# Patient Record
Sex: MALE | Race: WHITE | NOT HISPANIC OR LATINO | Employment: OTHER | URBAN - METROPOLITAN AREA
[De-identification: names, ages, dates, MRNs, and addresses within clinical notes are randomized per-mention and may not be internally consistent; named-entity substitution may affect disease eponyms.]

---

## 2017-01-31 ENCOUNTER — LAB CONVERSION - ENCOUNTER (OUTPATIENT)
Dept: OTHER | Facility: OTHER | Age: 52
End: 2017-01-31

## 2017-01-31 LAB
CREATINE KINASE-MB FRACTION (HISTORICAL): 2.4
INFLUENZA A PCR (HISTORICAL): NEGATIVE
INFLUENZA B PCR (HISTORICAL): NEGATIVE

## 2017-02-01 ENCOUNTER — ALLSCRIPTS OFFICE VISIT (OUTPATIENT)
Dept: OTHER | Facility: OTHER | Age: 52
End: 2017-02-01

## 2017-03-01 ENCOUNTER — GENERIC CONVERSION - ENCOUNTER (OUTPATIENT)
Dept: INTERNAL MEDICINE CLINIC | Facility: CLINIC | Age: 52
End: 2017-03-01

## 2017-03-01 ENCOUNTER — GENERIC CONVERSION - ENCOUNTER (OUTPATIENT)
Dept: OTHER | Facility: OTHER | Age: 52
End: 2017-03-01

## 2017-03-01 ENCOUNTER — ALLSCRIPTS OFFICE VISIT (OUTPATIENT)
Dept: OTHER | Facility: OTHER | Age: 52
End: 2017-03-01

## 2017-04-03 ENCOUNTER — LAB CONVERSION - ENCOUNTER (OUTPATIENT)
Dept: OTHER | Facility: OTHER | Age: 52
End: 2017-04-03

## 2017-04-03 LAB — Lab: NORMAL

## 2017-04-25 DIAGNOSIS — Z12.11 ENCOUNTER FOR SCREENING FOR MALIGNANT NEOPLASM OF COLON: ICD-10-CM

## 2017-04-25 DIAGNOSIS — F41.1 GENERALIZED ANXIETY DISORDER: ICD-10-CM

## 2017-04-25 DIAGNOSIS — Z12.5 ENCOUNTER FOR SCREENING FOR MALIGNANT NEOPLASM OF PROSTATE: ICD-10-CM

## 2017-04-25 DIAGNOSIS — I10 ESSENTIAL (PRIMARY) HYPERTENSION: ICD-10-CM

## 2017-05-03 ENCOUNTER — ALLSCRIPTS OFFICE VISIT (OUTPATIENT)
Dept: OTHER | Facility: OTHER | Age: 52
End: 2017-05-03

## 2017-07-05 ENCOUNTER — TRANSCRIBE ORDERS (OUTPATIENT)
Dept: LAB | Facility: MEDICAL CENTER | Age: 52
End: 2017-07-05

## 2017-07-05 ENCOUNTER — ALLSCRIPTS OFFICE VISIT (OUTPATIENT)
Dept: OTHER | Facility: OTHER | Age: 52
End: 2017-07-05

## 2017-07-05 ENCOUNTER — APPOINTMENT (OUTPATIENT)
Dept: LAB | Facility: MEDICAL CENTER | Age: 52
End: 2017-07-05
Payer: MEDICARE

## 2017-07-05 DIAGNOSIS — K80.20 CALCULUS OF GALLBLADDER WITHOUT CHOLECYSTITIS WITHOUT OBSTRUCTION: ICD-10-CM

## 2017-07-05 DIAGNOSIS — Z12.11 ENCOUNTER FOR SCREENING FOR MALIGNANT NEOPLASM OF COLON: ICD-10-CM

## 2017-07-05 LAB — HEMOCCULT STL QL IA: NEGATIVE

## 2017-07-05 PROCEDURE — G0328 FECAL BLOOD SCRN IMMUNOASSAY: HCPCS

## 2017-09-15 ENCOUNTER — ALLSCRIPTS OFFICE VISIT (OUTPATIENT)
Dept: OTHER | Facility: OTHER | Age: 52
End: 2017-09-15

## 2017-09-19 ENCOUNTER — GENERIC CONVERSION - ENCOUNTER (OUTPATIENT)
Dept: OTHER | Facility: OTHER | Age: 52
End: 2017-09-19

## 2017-10-09 ENCOUNTER — GENERIC CONVERSION - ENCOUNTER (OUTPATIENT)
Dept: OTHER | Facility: OTHER | Age: 52
End: 2017-10-09

## 2017-10-09 DIAGNOSIS — Z11.59 ENCOUNTER FOR SCREENING FOR OTHER VIRAL DISEASES (CODE): ICD-10-CM

## 2017-10-09 DIAGNOSIS — R53.83 OTHER FATIGUE: ICD-10-CM

## 2017-11-13 ENCOUNTER — ALLSCRIPTS OFFICE VISIT (OUTPATIENT)
Dept: OTHER | Facility: OTHER | Age: 52
End: 2017-11-13

## 2017-11-14 NOTE — PROGRESS NOTES
Assessment    1  Benign essential hypertension (401 1) (I10)   2  Fatigue (780 79) (R53 83)    Plan  Generalized anxiety disorder    · ALPRAZolam 2 MG Oral Tablet; one tablet bid    Discussion/Summary  Discussion Summary:   Doing better  BP up and now on the borderline high side  Will continue with current doses and recheck in six weeks  May need to increase on of the meds  HR is better and may be able to increase the beta blocker as long as we don't bring back the fatigue  Pt to get his labs  RTC six weeks for routine  Medication SE Review and Pt Understands Tx: Possible side effects of new medications were reviewed with the patient/guardian today  The treatment plan was reviewed with the patient/guardian  The patient/guardian understands and agrees with the treatment plan      Chief Complaint  Chief Complaint Free Text Note Form: FU Last Visit - Hypotension, Dizziness  Orbie Mattock Orbie Mattock Pt feels better since BP has increased   no longer gets dizzy  History of Present Illness  HPI: WM RTC for f/u fatigue and low bp  Cut his ACEI in half  Did not get his labs  Feeling much better  No new c/o's  Review of Systems  Complete-Male:  Constitutional: no fever,-- not feeling poorly,-- no chills-- and-- not feeling tired  Cardiovascular: no chest pain  Respiratory: no shortness of breath  Gastrointestinal: no abdominal pain  Genitourinary: no dysuria  Neurological: no headache  Active Problems  1  Benign essential hypertension (401 1) (I10)   2  BMI 40 0-44 9, adult (V85 41) (Z68 41)   3  Chronic narcotic use (305 50) (F11 90)   4  Degeneration of intervertebral disc of lumbosacral region (722 52) (M51 37)   5  Fatigue (780 79) (R53 83)   6  Gallbladder polyp (575 6) (K82 4)   7  Generalized anxiety disorder (300 02) (F41 1)   8  Hepatic steatosis (571 8) (K76 0)   9  Marijuana use (305 20) (F12 90)   10  Need for hepatitis C screening test (V73 89) (Z11 59)   11  Nephrolithiasis (592 0) (N20 0)   12   Obesity (278 00) (E66 9)   13  Special screening for malignant neoplasms, colon (V76 51) (Z12 11)   14  Tobacco use disorder (305 1) (F17 200)    Past Medical History  1  History of Chest pain (786 50) (R07 9)   2  History of Constipation (564 00) (K59 00)   3  History of Encounter for prostate cancer screening (V76 44) (Z12 5)   4  History of Flu vaccine need (V04 81) (Z23)   5  History of folliculitis (Z88 1) (N03 2)   6  History of pleural effusion (V12 69) (Z87 09)   7  History of pneumonia (V12 61) (Z87 01)   8  History of Need for vaccination for DTP (V06 1) (Z23)  Active Problems And Past Medical History Reviewed: The active problems and past medical history were reviewed and updated today  Surgical History  1  History of Chest Tube Insertion   2  History of Hernia Repair   3  History of Laminectomy Lumbar   4  History of Tonsillectomy  Surgical History Reviewed: The surgical history was reviewed and updated today  Family History  Mother    1  Family history of cardiac disorder (V17 49) (Z82 49)   2  Family history of congestive heart failure (V17 49) (Z82 49)   3  Family history of diabetes mellitus (V18 0) (Z83 3)  Father    4  Family history of malignant neoplasm (V16 9) (Z80 9)   5  Family history of Liver carcinoma  Brother    6  Family history of malignant neoplasm (V16 9) (Z80 9)   7  Family history of malignant neoplasm of testis (V16 43) (Z80 43)   8  History of malignant neoplasm of lung  Family History    9  Denied: Family history of Drug abuse   10  Family history of Liver carcinoma  Family History Reviewed: The family history was reviewed and updated today         Social History     · Caffeine use (V49 89) (F15 90)   · Chronic narcotic use (305 50) (F11 90)   · Current every day smoker (305 1) (F17 200)   · Former consumption of alcohol (V11 3) (R80 563)   · History of drug use (305 93) (Z87 898)   · Lives with adult children   · Marijuana   · On disability   · One child   · Denied: Seeing a dentist   · Single  Social History Reviewed: The social history was reviewed and updated today  The social history was reviewed and is unchanged  Current Meds   1  ALPRAZolam 2 MG Oral Tablet; one tablet bid; Therapy: 71GDF2805 to (Last Rx:09Oct2017) Ordered   2  CloNIDine HCl - 0 3 MG Oral Tablet; TAKE ONE(1) TABLET BY MOUTH TWICE DAILY; Therapy: 54HPH0714 to (Last Rx:57Qzn4590)  Requested for: 08Dfc5214 Ordered   3  Furosemide 40 MG Oral Tablet; TAKE ONE (1) TABLET BY MOUTH ONCE DAILY PRN; Therapy: 09WEP0204 to (Last Rx:14Kpv7103)  Requested for: 32Rxv3051 Ordered   4  Lyrica 200 MG Oral Capsule; Take 1 capsule twice daily; Therapy: (Recorded:40Sei5267) to Recorded   5  Metoprolol Tartrate 50 MG Oral Tablet; TAKE ONE(1) TABLET BY MOUTH TWICE DAILY; Therapy: 57AHI2074 to (Last Rx:32Fph8967)  Requested for: 75Cyj9946 Ordered   6  Percocet  MG Oral Tablet; TAKE 1 TABLET 4 TIMES DAILY AS NEEDED FOR PAIN; Therapy: (Ifeoma Nap) to Recorded  Medication List Reviewed: The medication list was reviewed and updated today  Allergies  1  Codeine Sulfate TABS    Vitals  Vital Signs    Recorded: 07PML5144 01:50PM   Temperature 98 F   Heart Rate 86   Respiration 18   Systolic 426   Diastolic 82   Height 6 ft    Weight 303 lb    BMI Calculated 41 09   BSA Calculated 2 54       Physical Exam   Constitutional  General appearance: No acute distress, well appearing and well nourished  Eyes  Conjunctiva and lids: No swelling, erythema, or discharge  Pupils and irises: Equal, round and reactive to light  Ears, Nose, Mouth, and Throat  Oropharynx: Normal with no erythema, edema, exudate or lesions  Pulmonary  Respiratory effort: No increased work of breathing or signs of respiratory distress  Auscultation of lungs: Clear to auscultation, equal breath sounds bilaterally, no wheezes, no rales, no rhonci  Cardiovascular  Auscultation of heart: Normal rate and rhythm, normal S1 and S2, without murmurs  Examination of extremities for edema and/or varicosities: Normal    Abdomen  Abdomen: Non-tender, no masses  Musculoskeletal  Gait and station: Normal    Psychiatric  Orientation to person, place and time: Normal    Mood and affect: Normal          Health Management  Health Maintenance   Medicare Annual Wellness Visit; every 1 year; Last 23FNY8636; Next Due: 88BBD6392;  Active    Signatures   Electronically signed by : NASIM Desai ; Nov 13 2017  2:09PM EST                       (Author)

## 2017-12-26 ENCOUNTER — ALLSCRIPTS OFFICE VISIT (OUTPATIENT)
Dept: OTHER | Facility: OTHER | Age: 52
End: 2017-12-26

## 2018-01-13 VITALS
HEIGHT: 72 IN | BODY MASS INDEX: 42.56 KG/M2 | WEIGHT: 314.25 LBS | OXYGEN SATURATION: 96 % | SYSTOLIC BLOOD PRESSURE: 122 MMHG | TEMPERATURE: 98.6 F | DIASTOLIC BLOOD PRESSURE: 76 MMHG | HEART RATE: 73 BPM

## 2018-01-13 VITALS
BODY MASS INDEX: 41.04 KG/M2 | DIASTOLIC BLOOD PRESSURE: 82 MMHG | SYSTOLIC BLOOD PRESSURE: 144 MMHG | RESPIRATION RATE: 18 BRPM | HEIGHT: 72 IN | TEMPERATURE: 98 F | HEART RATE: 86 BPM | WEIGHT: 303 LBS

## 2018-01-14 VITALS
HEIGHT: 72 IN | TEMPERATURE: 98.6 F | BODY MASS INDEX: 41.58 KG/M2 | DIASTOLIC BLOOD PRESSURE: 88 MMHG | SYSTOLIC BLOOD PRESSURE: 152 MMHG | RESPIRATION RATE: 18 BRPM | WEIGHT: 307 LBS | HEART RATE: 96 BPM

## 2018-01-14 VITALS
BODY MASS INDEX: 42.16 KG/M2 | WEIGHT: 311.25 LBS | OXYGEN SATURATION: 97 % | HEIGHT: 72 IN | TEMPERATURE: 94.8 F | HEART RATE: 63 BPM | SYSTOLIC BLOOD PRESSURE: 124 MMHG | DIASTOLIC BLOOD PRESSURE: 72 MMHG

## 2018-01-14 VITALS
DIASTOLIC BLOOD PRESSURE: 78 MMHG | BODY MASS INDEX: 41.09 KG/M2 | TEMPERATURE: 96.8 F | WEIGHT: 303.38 LBS | HEIGHT: 72 IN | SYSTOLIC BLOOD PRESSURE: 126 MMHG

## 2018-01-15 ENCOUNTER — APPOINTMENT (OUTPATIENT)
Dept: LAB | Facility: CLINIC | Age: 53
End: 2018-01-15
Payer: MEDICARE

## 2018-01-15 ENCOUNTER — ALLSCRIPTS OFFICE VISIT (OUTPATIENT)
Dept: OTHER | Facility: OTHER | Age: 53
End: 2018-01-15

## 2018-01-15 ENCOUNTER — TRANSCRIBE ORDERS (OUTPATIENT)
Dept: LAB | Facility: CLINIC | Age: 53
End: 2018-01-15

## 2018-01-15 ENCOUNTER — GENERIC CONVERSION - ENCOUNTER (OUTPATIENT)
Dept: FAMILY MEDICINE CLINIC | Facility: CLINIC | Age: 53
End: 2018-01-15

## 2018-01-15 DIAGNOSIS — N28.9 DISORDER OF KIDNEY AND URETER: ICD-10-CM

## 2018-01-15 LAB
ALBUMIN SERPL BCP-MCNC: 3.7 G/DL (ref 3.5–5)
ALP SERPL-CCNC: 76 U/L (ref 46–116)
ALT SERPL W P-5'-P-CCNC: 38 U/L (ref 12–78)
ANION GAP SERPL CALCULATED.3IONS-SCNC: 3 MMOL/L (ref 4–13)
AST SERPL W P-5'-P-CCNC: 29 U/L (ref 5–45)
BILIRUB SERPL-MCNC: 0.46 MG/DL (ref 0.2–1)
BUN SERPL-MCNC: 15 MG/DL (ref 5–25)
CALCIUM SERPL-MCNC: 9.7 MG/DL (ref 8.3–10.1)
CHLORIDE SERPL-SCNC: 103 MMOL/L (ref 100–108)
CO2 SERPL-SCNC: 33 MMOL/L (ref 21–32)
CREAT SERPL-MCNC: 1.08 MG/DL (ref 0.6–1.3)
GFR SERPL CREATININE-BSD FRML MDRD: 78 ML/MIN/1.73SQ M
GLUCOSE SERPL-MCNC: 136 MG/DL (ref 65–140)
POTASSIUM SERPL-SCNC: 4.8 MMOL/L (ref 3.5–5.3)
PROT SERPL-MCNC: 7.5 G/DL (ref 6.4–8.2)
SODIUM SERPL-SCNC: 139 MMOL/L (ref 136–145)

## 2018-01-15 PROCEDURE — 36415 COLL VENOUS BLD VENIPUNCTURE: CPT

## 2018-01-15 PROCEDURE — 80053 COMPREHEN METABOLIC PANEL: CPT

## 2018-01-16 ENCOUNTER — GENERIC CONVERSION - ENCOUNTER (OUTPATIENT)
Dept: OTHER | Facility: OTHER | Age: 53
End: 2018-01-16

## 2018-01-22 VITALS
BODY MASS INDEX: 41.72 KG/M2 | HEIGHT: 72 IN | HEART RATE: 90 BPM | DIASTOLIC BLOOD PRESSURE: 60 MMHG | SYSTOLIC BLOOD PRESSURE: 100 MMHG | WEIGHT: 308 LBS | TEMPERATURE: 98.2 F | RESPIRATION RATE: 18 BRPM

## 2018-01-22 VITALS — DIASTOLIC BLOOD PRESSURE: 78 MMHG | SYSTOLIC BLOOD PRESSURE: 126 MMHG

## 2018-01-22 VITALS
OXYGEN SATURATION: 97 % | TEMPERATURE: 96.6 F | HEART RATE: 70 BPM | WEIGHT: 306.38 LBS | BODY MASS INDEX: 41.5 KG/M2 | HEIGHT: 72 IN

## 2018-01-23 VITALS
SYSTOLIC BLOOD PRESSURE: 156 MMHG | HEART RATE: 80 BPM | BODY MASS INDEX: 41.45 KG/M2 | WEIGHT: 306 LBS | TEMPERATURE: 98.3 F | DIASTOLIC BLOOD PRESSURE: 84 MMHG | HEIGHT: 72 IN | RESPIRATION RATE: 18 BRPM

## 2018-01-23 NOTE — MISCELLANEOUS
Assessment    1  Nephrolithiasis (592 0) (N20 0)   2  Acute renal insufficiency (593 9) (N28 9)    Plan  Acute renal insufficiency    · (1) COMPREHENSIVE METABOLIC PANEL; Status:Active; Requested SGA:75EMH3144; Perform:[facility]; EJN:86XOK9024;GKWJZMX; For:Acute renal insufficiency; Ordered By:Jesús Walker;  Generalized anxiety disorder    · ALPRAZolam 2 MG Oral Tablet; one tablet bid   Rx By: [facility]; Dispense: 0 Days ; #:60 Tablet; Refill: 0; For: Generalized anxiety disorder; GUILLERMINA = N; Print Rx; Last Updated By: Julio Cesar Dunn; 1/15/2018 12:57:20 PM  Nephrolithiasis    · Indomethacin 50 MG Oral Capsule; TAKE 1 CAPSULE 3 TIMES DAILY WITH FOOD  AS NEEDED   Rx By: [facility]; Dispense: 10 Days ; #:30 Capsule; Refill: 0; For: Nephrolithiasis; GUILLERMINA = N; Verified Transmission to [place] [address] ; Last Updated By: System, [employer]; 1/15/2018 1:05:51 PM   · Ketorolac Tromethamine 30 MG/ML Injection Solution; inject 1  ml  intramuscular; To Be Done: 99AKK4808   Rx By: [facility]; For: Nephrolithiasis; GUILLERMINA = N; Request Administration    Discussion/Summary  Discussion Summary:   Clinically stable  Will check renal function and add indomethacin to the flomax  Increase po fluids and keep f/u with   RTC as scheduled  Medication SE Review and Pt Understands Tx: Possible side effects of new medications were reviewed with the patient/guardian today  The treatment plan was reviewed with the patient/guardian  The patient/guardian understands and agrees with the treatment plan      Chief Complaint  Chief Complaint Free Text Note Form: [facility] FU - Kidney Stones, Infection  Dara Soulier Dara Soulier Pt still c/o mild left kidney pain and nocturia  History of Present Illness  TCM Communication [facility]: The patient is being contacted for follow-up after hospitalization and APPT 1-15-18  Hospital records were not available  He was hospitalized [facility]  The date of admission: 1-7-18, date of discharge: 1-11-18  Diagnosis: KIDNEY STONES, INFECTION  He was discharged to home  He scheduled a follow up appointment  Communication performed and completed by 1-12-18 SAF   HPI: WM with h/o nephrolithiasis presents for brief admission to [facility] for left flank pain  Found to have a non obstructing kidney stone and CLAUDIO  Hydrated and d/c'd  Doing a little better  On flomax  Hasn't seen  yet  Still with some left flank pain and rated a 4/10  No fever, chills, nausea, or vomiting  No gross hematuria  Review of Systems  Complete-Male:   Constitutional: feeling poorly and feeling tired, but no fever and no chills  Cardiovascular: no chest pain  Respiratory: no shortness of breath  Gastrointestinal: no abdominal pain  Genitourinary: as noted in HPI, no dysuria, no urinary hesitancy, no incontinence and no nocturia  Neurological: no headache  Active Problems    1  Benign essential hypertension (401 1) (I10)   2  BMI 40 0-44 9, adult (V85 41) (Z68 41)   3  Chronic narcotic use (305 50) (F11 90)   4  Degeneration of intervertebral disc of lumbosacral region (722 52) (M51 37)   5  Fatigue (780 79) (R53 83)   6  Gallbladder polyp (575 6) (K82 4)   7  Generalized anxiety disorder (300 02) (F41 1)   8  Hepatic steatosis (571 8) (K76 0)   9  Marijuana use (305 20) (F12 90)   10  Need for hepatitis C screening test (V73 89) (Z11 59)   11  Nephrolithiasis (592 0) (N20 0)   12  Obesity (278 00) (E66 9)   13  Special screening for malignant neoplasms, colon (V76 51) (Z12 11)   14  Tobacco use disorder (305 1) (F17 200)    Past Medical History    1  History of Chest pain (786 50) (R07 9)   2  History of Constipation (564 00) (K59 00)   3  History of Encounter for prostate cancer screening (V76 44) (Z12 5)   4  History of Flu vaccine need (V04 81) (Z23)   5  History of folliculitis (H39 2) (B84 6)   6  History of pleural effusion (V12 69) (Z87 09)   7  History of pneumonia (V12 61) (Z87 01)   8   History of Need for vaccination for DTP (V06 1) (Z23)    Surgical History    1  History of Chest Tube Insertion   2  History of Hernia Repair   3  History of Laminectomy Lumbar   4  History of Tonsillectomy  Surgical History Reviewed: The surgical history was reviewed and updated today  Family History  Mother    1  Family history of cardiac disorder (V17 49) (Z82 49)   2  Family history of congestive heart failure (V17 49) (Z82 49)   3  Family history of diabetes mellitus (V18 0) (Z83 3)  Father    4  Family history of malignant neoplasm (V16 9) (Z80 9)   5  Family history of Liver carcinoma  Brother    6  Family history of malignant neoplasm (V16 9) (Z80 9)   7  Family history of malignant neoplasm of testis (V16 43) (Z80 43)   8  History of malignant neoplasm of lung  Family History    9  Denied: Family history of Drug abuse   10  Family history of Liver carcinoma  Family History Reviewed: The family history was reviewed and updated today  Social History    · Caffeine use (V49 89) (F15 90)   · Chronic narcotic use (305 50) (F11 90)   · Current every day smoker (305 1) (F17 200)   · Former consumption of alcohol (V11 3) (Z87 898)   · History of drug use (305 93) (Z87 898)   · Lives with adult children   · Marijuana   · On disability   · One child   · Denied: Seeing a dentist   · Single  Social History Reviewed: The social history was reviewed and updated today  The social history was reviewed and is unchanged  Current Meds   1  ALPRAZolam 2 MG Oral Tablet; one tablet bid; Therapy: 85BST8801 to (Last Rx:54Acb6998) Ordered   2  CloNIDine HCl - 0 3 MG Oral Tablet; TAKE ONE(1) TABLET BY MOUTH TWICE DAILY; Therapy: 30HMM0678 to (Last Rx:18Cva7443)  Requested for: 33Xej4841 Ordered   3  Furosemide 40 MG Oral Tablet; TAKE ONE (1) TABLET BY MOUTH ONCE DAILY PRN; Therapy: 95STA5473 to (Last Rx:98Fld1802)  Requested for: 09Xld1976 Ordered   4  Lyrica 200 MG Oral Capsule; Take 1 capsule twice daily;    Therapy: (Recorded:31Xnm0473) to Recorded   5  Metoprolol Tartrate 50 MG Oral Tablet; TAKE ONE(1) TABLET BY MOUTH TWICE DAILY; Therapy: 52DBN4985 to (Last Rx:27Baj4639)  Requested for: 23Fxa0104 Ordered   6  Percocet  MG Oral Tablet; TAKE 1 TABLET 4 TIMES DAILY AS NEEDED FOR PAIN;   Therapy: (Kristine Pellant) to Recorded   7  Tamsulosin HCl - 0 4 MG Oral Capsule; TAKE 1 CAPSULE Daily; Therapy: 57CZA4717 to (Evaluate:10Jan2019) Recorded  Medication List Reviewed: The medication list was reviewed and updated today  Allergies    1  Codeine Sulfate TABS    Vitals  Signs   Recorded: 33RCT4781 12:55PM   Temperature: 98 3 F  Heart Rate: 80  Respiration: 18  Systolic: 674  Diastolic: 84  Height: 6 ft   Weight: 306 lb   BMI Calculated: 41 5  BSA Calculated: 2 55    Physical Exam    Constitutional   General appearance: No acute distress, well appearing and well nourished  Eyes   Conjunctiva and lids: No swelling, erythema, or discharge  Pupils and irises: Equal, round and reactive to light  Ears, Nose, Mouth, and Throat   Oropharynx: Normal with no erythema, edema, exudate or lesions  Pulmonary   Respiratory effort: No increased work of breathing or signs of respiratory distress  Auscultation of lungs: Clear to auscultation, equal breath sounds bilaterally, no wheezes, no rales, no rhonci  Cardiovascular   Auscultation of heart: Normal rate and rhythm, normal S1 and S2, without murmurs  Examination of extremities for edema and/or varicosities: Normal     Carotid pulses: Normal     Abdomen   Abdomen: Abnormal   Soft/ND with left Rylan's  BS wnl  w/o guarding or rebound  Musculoskeletal   Gait and station: Normal     Psychiatric   Orientation to person, place and time: Normal     Mood and affect: Normal          Health Management  Health Maintenance   Medicare Annual Wellness Visit; every 1 year;  Last 05BGB5904; Next Due: 92NOE5819; Near Due    Signatures   Electronically signed by : NASIM Gilmore ; Babak 15 2018  1:10PM EST                       (Author)

## 2018-01-23 NOTE — PROGRESS NOTES
Assessment   1  Encounter for preventive health examination (V70 0) (Z00 00)    Plan  Generalized anxiety disorder    · Start: ALPRAZolam 2 MG Oral Tablet; one tablet bid   · Follow-up visit in 1 month Evaluation and Treatment  Follow-up  Status: Hold For -  Scheduling  Requested for: 04DLD8187  Health Maintenance    · Medicare Annual Wellness Visit ; every 1 year; Last 12DXS9143; Next 23KMI2503;  Status:Active    Discussion/Summary    Refill rx  Form completed for dental work1      Impression: Subsequent Annual Wellness Visit, with preventive exam as well as age and risk appropriate counseling completed1   Cardiovascular screening and counselin  screening is current1  and counseling was given on maintaining a healthy weight1   Diabetes screening and counselin  screening is current1  and counseling was given on maintaining a healthy diet1   Colorectal cancer screening and counselin  screening is current1  and counseling was given on ways to eat a high fiber diet1   Prostate cancer screening and counselin  screening is current1   Osteoporosis screening and counselin  screening not indicated1 , counseling was given on obtaining adequate amounts of calcium and vitamin D on a daily basis1  and counseling was given on the importance of regular weightbearing exercise1   Abdominal aortic aneurysm screening and counselin  screening not indicated1   Glaucoma screening and counselin  screening is current1   HIV screening and counselin  screening not indicated1   Immunizations:1  Influenza vaccine is up to date this year1 , influenza vaccination is recommended annually1 , the lifetime pneumococcal vaccine has been completed1 , hepatitis B vaccination series is not indicated at this time due to the patient's low risk of sulma the disease1 , Td vaccination status is unknown1  and Tdap vaccination status is unknown1      Advance Directive Plannin  not complete1 , paperwork and instructions were given to the patient1 , he was encouraged to follow-up with me to discuss his questions and/or decisions1   Advice and education were given regarding1  weight loss1   Patient Discussion:1  plan discussed with the patient1 , follow-up visit needed in 1 month1   The patient has the current Goals:1  Weight loss,dental care1  The patent has the current Barriers:1  Depression1        Patient is able to Self-Care  The treatment plan was reviewed with the patient/guardian  The patient/guardian understands and agrees with the treatment plan1          Self Referrals: No       1 Amended By: Micheline Lester; Mar 01 2017 9:15 PM EST    Chief Complaint  Pt presents for Well Exam  Pt feels well today and without complaint  Appetite is normal per patient  Requests refill of his alprazolam today  History of Present Illness  HPI: Pt doing ok  Trying to get dental work done1    Welcome to [employer] and Wellness Visits: The patient is being seen for the subsequent annual wellness visit  Medicare Screening and Risk Factors1    Hospitalizations:1  he has been previously hospitalizied1  and he has been hospitalized hx pneumonia times1   Once per lifetime medicare screening tests:1  ECG1  and AAA screening US has not yet been done1   Medicare Screening Tests Risk Questions   Abdominal aortic aneurysm risk assessment:1  none indicated1   Osteoporosis risk assessment:1  caucasian1  and over 48years of age2   HIV risk assessment:1  none indicated1   Drug and Alcohol Use:1  The patient  is a current cigarette smoker1  1   He  has smoked for 40 year(s)1  1   He  is not ready to quit using tobacco1  1   The patient reports  never drinking alcohol1  1   Alcohol concern: 1   The patient has no concerns about alcohol abuse1   He  has never used illicit drugs1  1   Diet and Physical Activity:1  Current diet includes  low salt food choices1  and  limited junk food1  1    He  exercises infrequently1  1   Exercise:1  walking1  30 minutes per day1 , walks the dog1   Mood Disorder and Cognitive Impairment Screenin    Depression screening  Mild to moderate symptoms1   He denies feeling down, depressed, or hopeless over the past two weeks1   He denies feeling little interest or pleasure in doing things over the past two weeks1   Further Evaluation:1  sees psych1   Cognitive impairment screenin  denies difficulty learning/retaining new information1 , denies difficulty handling complex tasks1 , denies difficulty with reasoning1 , denies difficulty with spatial ability and orientation1 , denies difficulty with language1  and denies difficulty with behavior1   Functional Ability/Level of Safety:1  Hearing is1  slightly decreased1  and a hearing aid is not used1   He denies hearing difficulties1  The patient is currently1  able to do activities of daily living with limitations1 , able to do instrumental activities of daily living with limitations1 , able to participate in social activities with limitations1  and unable to drive1   Activities of daily living details:1  does not need help using the phone1 , no transportation help needed1 , does not need help shopping1 , no meal preparation help needed1 , does not need help doing housework1 , does not need help doing laundry1 , does not need help managing medications1  and does not need help managing money1   Home safety risk factors: 1  no unfamiliar surroundings1 , no loose rugs1 , no poor household lighting1 , no uneven floors1 , no household clutter1 , grab bars in the bathroom1  and handrails on the stairs1   Advance Directives:1  Advance directives: 1  no living will1 , no durable power of  for health care directives1  and no advance directives1   End of life decisions were reviewed with the patient1  and I agree with the patient's decisions1      Co-Managers and Medical Equipment/Suppliers: See Patient Care Team   Reviewed Updated [address] [place] [address] [address]:   Last Medicare Wellness Visit Information was reviewed, patient interviewed, no change since last AWV        1 Amended By: Karen Casanova; Mar 01 2017 9:12 PM EST    Patient Care Team    Care Team Member Role Specialty Office Number   Karen Casanova [facility]  Internal Medicine [phone]     Active Problems   1  Acute bronchitis (466 0) (J20 9)  2  Benign essential hypertension (401 1) (I10)  3  Cholelithiasis without cholecystitis (574 20) (K80 20)  4  Degeneration of intervertebral disc of lumbosacral region (722 52) (M51 37)  5  Encounter for screening for malignant neoplasm of colon (V76 51) (Z12 11)  6  Encounter for special screening examination for neoplasm of prostate (V76 44) (Z12 5)  7  Generalized anxiety disorder (300 02) (F41 1)    Past Medical History    · History of Chest pain (786 50) (R07 9)   · History of Constipation (564 00) (K59 00)   · History of Encounter for prostate cancer screening (V76 44) (Z12 5)   · History of Flu vaccine need (V04 81) (Z23)   · History of folliculitis (M79 9) (D61 2)   · History of pneumonia (V12 61) (Z87 01)   · History of Need for vaccination for DTP (V06 1) (Z23)    The active problems and past medical history were reviewed and updated today  Surgical History    · History of Hernia Repair   · History of Laminectomy Lumbar    The surgical history was reviewed and updated today  Family History  Mother    · Family history of cardiac disorder (V17 49) (Z80 55)  Father    · Family history of malignant neoplasm (V16 9) (Z80 9)  Brother    · Family history of malignant neoplasm (V16 9) (Z80 9)  Family History    · Denied: Family history of Drug abuse    The family history was reviewed and updated today  Social History    · Caffeine use (V49 89) (F15 90)   · Current every day smoker (305 1) (F17 200)   · No alcohol use   · No drug use   · Denied: Seeing a dentist   · Single  The social history was reviewed and updated today   The social history was reviewed and is unchanged  Current Meds  1  ALPRAZolam 2 MG Oral Tablet; one tablet bid; Therapy: 27PBO4315 to (Last Rx:88Izj5159) Ordered  2  CloNIDine HCl - 0 3 MG Oral Tablet; TAKE ONE(1) TABLET BY MOUTH TWICE DAILY; Therapy: 09OYF7845 to (Last Rx:30Uzx3495)  Requested for: 03Feb2017 Ordered  3  Furosemide 40 MG Oral Tablet; TAKE ONE (1) TABLET BY MOUTH ONCE DAILY PRN; Therapy: 97JUW4395 to (Last Rx:67Afu5499)  Requested for: 16Azp7672 Ordered  4  Lisinopril 40 MG Oral Tablet; TAKE ONE (1) TABLET DAILY IN THE EVENING; Therapy: 49PPF5320 to (Last Rx:86Whj5635)  Requested for: 12ZFT7452 Ordered  5  Metoprolol Tartrate 50 MG Oral Tablet; Take one tablet by mouth twice daily; Therapy: 22UPH6252 to (Last Rx:30Vmv5306)  Requested for: 16Ikj9010 Ordered    The medication list was reviewed and updated today  Allergies   1  Codeine Sulfate TABS    Immunizations   1 2    Influenza  31-Dec-2015 05-Oct-2016    PPSV  2011     Tdap  Temporarily Deferred: Pt refuses      Vitals  Signs    Systolic: 818  Diastolic: 78   Temperature: 96 6 F  Heart Rate: 70  Height: 6 ft   Weight: 306 lb 6 08 oz  BMI Calculated: 41 55  BSA Calculated: 2 55  O2 Saturation: 97    Physical Exam    Constitutional1    General appearance: No acute distress, well appearing and well nourished  1    Psychiatric1    Judgment and insight: Normal 1    Orientation to person, place and time: Normal 1    Recent and remote memory: Intact  1    Mood and affect: Normal 1        1 Amended By: Silvina Diego; Mar 01 2017 9:13 PM EST    Health Management  Health Maintenance   Medicare Annual Wellness Visit; every 1 year; Last 88KOS4416; Next Due: 06MEY0325;   Active    Signatures   Electronically signed by : Carolina Rendon DO; Mar  1 2017  9:15PM EST                       (Author)

## 2018-01-23 NOTE — MISCELLANEOUS
Provider Comments  Provider Comments:   NO SHOW NO CALL APPOINTMENT      Signatures   Electronically signed by : Sara Vogt, ; Dec 26 2017  1:13PM EST                       (Author)

## 2018-01-23 NOTE — RESULT NOTES
Verified Results  (1) COMPREHENSIVE METABOLIC PANEL [id number] 18:17CU Chase Hines Order Number: [id number]     Test Name Result Flag Reference   GLUCOSE,RANDM 136 mg/dL     If the patient is fasting, the ADA then defines impaired fasting glucose as > 100 mg/dL and diabetes as > or equal to 123 mg/dL  Specimen collection should occur prior to Sulfasalazine administration due to the potential for falsely depressed results  Specimen collection should occur prior to Sulfapyridine administration due to the potential for falsely elevated results  SODIUM 139 mmol/L  136-145   POTASSIUM 4 8 mmol/L  3 5-5 3   CHLORIDE 103 mmol/L  100-108   CARBON DIOXIDE 33 mmol/L H 21-32   ANION GAP (CALC) 3 mmol/L L 4-13   BLOOD UREA NITROGEN 15 mg/dL  5-25   CREATININE 1 08 mg/dL  0 60-1 30   Standardized to IDMS reference method   CALCIUM 9 7 mg/dL  8 3-10 1   BILI, TOTAL 0 46 mg/dL  0 20-1 00   ALK PHOSPHATAS 76 U/L     ALT (SGPT) 38 U/L  12-78   Specimen collection should occur prior to Sulfasalazine and/or Sulfapyridine administration due to the potential for falsely depressed results  AST(SGOT) 29 U/L  5-45   Specimen collection should occur prior to Sulfasalazine administration due to the potential for falsely depressed results  ALBUMIN 3 7 g/dL  3 5-5 0   TOTAL PROTEIN 7 5 g/dL  6 4-8 2   eGFR 78 ml/min/1 73sq m     [facility] Disease Education Program recommendations are as follows:  GFR calculation is accurate only with a steady state creatinine  Chronic Kidney disease less than 60 ml/min/1 73 sq  meters  Kidney failure less than 15 ml/min/1 73 sq  meters

## 2018-02-12 DIAGNOSIS — F41.1 GENERALIZED ANXIETY DISORDER: Primary | ICD-10-CM

## 2018-02-12 RX ORDER — ALPRAZOLAM 2 MG/1
1 TABLET ORAL 2 TIMES DAILY
COMMUNITY
Start: 2017-05-03 | End: 2018-02-12 | Stop reason: SDUPTHER

## 2018-02-12 RX ORDER — ALPRAZOLAM 2 MG/1
2 TABLET ORAL 2 TIMES DAILY
Qty: 60 TABLET | Refills: 0 | Status: SHIPPED | OUTPATIENT
Start: 2018-02-12 | End: 2018-03-13 | Stop reason: SDUPTHER

## 2018-03-13 ENCOUNTER — TELEPHONE (OUTPATIENT)
Dept: INTERNAL MEDICINE CLINIC | Facility: CLINIC | Age: 53
End: 2018-03-13

## 2018-03-13 DIAGNOSIS — F41.1 GENERALIZED ANXIETY DISORDER: ICD-10-CM

## 2018-03-13 RX ORDER — ALPRAZOLAM 2 MG/1
2 TABLET ORAL 2 TIMES DAILY
Qty: 60 TABLET | Refills: 0 | Status: SHIPPED | OUTPATIENT
Start: 2018-03-13 | End: 2018-04-09 | Stop reason: SDUPTHER

## 2018-04-09 DIAGNOSIS — F41.1 GENERALIZED ANXIETY DISORDER: ICD-10-CM

## 2018-04-09 RX ORDER — ALPRAZOLAM 2 MG/1
2 TABLET ORAL 2 TIMES DAILY
Qty: 60 TABLET | Refills: 0 | Status: SHIPPED | OUTPATIENT
Start: 2018-04-09 | End: 2018-05-08 | Stop reason: SDUPTHER

## 2018-04-17 PROBLEM — E66.9 OBESITY: Status: ACTIVE | Noted: 2017-09-15

## 2018-04-17 PROBLEM — F17.200 TOBACCO USE DISORDER: Status: ACTIVE | Noted: 2017-09-15

## 2018-04-17 PROBLEM — K76.0 HEPATIC STEATOSIS: Status: ACTIVE | Noted: 2017-09-15

## 2018-04-17 PROBLEM — N20.0 NEPHROLITHIASIS: Status: ACTIVE | Noted: 2017-09-15

## 2018-04-17 PROBLEM — N40.0 BENIGN PROSTATE HYPERPLASIA: Status: ACTIVE | Noted: 2018-01-15

## 2018-04-17 PROBLEM — K82.4 GALLBLADDER POLYP: Status: ACTIVE | Noted: 2017-09-15

## 2018-04-30 LAB
ALBUMIN SERPL BCP-MCNC: 4.3 G/DL (ref 3.5–5.7)
ALP SERPL-CCNC: 87 IU/L (ref 40–150)
ALT SERPL W P-5'-P-CCNC: 42 IU/L (ref 0–50)
AMYLASE (HISTORICAL): 25 U/L (ref 29–103)
ANION GAP SERPL CALCULATED.3IONS-SCNC: 12.9 MM/L
APTT PPP: 36.8 SEC (ref 24.4–37.6)
AST SERPL W P-5'-P-CCNC: 29 U/L (ref 8–27)
BACTERIA UR QL AUTO: ABNORMAL
BASOPHILS # BLD AUTO: 0.1 X3/UL (ref 0–0.3)
BASOPHILS # BLD AUTO: 0.9 % (ref 0–2)
BILIRUB SERPL-MCNC: 0.4 MG/DL (ref 0.3–1)
BILIRUB UR QL STRIP: NEGATIVE
BUN SERPL-MCNC: 17 MG/DL (ref 7–25)
CALCIUM SERPL-MCNC: 9.2 MG/DL (ref 8.6–10.5)
CHLORIDE SERPL-SCNC: 98 MM/L (ref 98–107)
CLARITY UR: CLEAR
CO2 SERPL-SCNC: 28 MM/L (ref 21–31)
COLOR UR: YELLOW
CREAT SERPL-MCNC: 1.29 MG/DL (ref 0.7–1.3)
DEPRECATED RDW RBC AUTO: 14 % (ref 11.5–14.5)
EGFR (HISTORICAL): 58 GFR
EGFR AFRICAN AMERICAN (HISTORICAL): > 60 GFR
EOSINOPHIL # BLD AUTO: 0.2 X3/UL (ref 0–0.5)
EOSINOPHIL NFR BLD AUTO: 1.7 % (ref 0–5)
GLUCOSE (HISTORICAL): 170 MG/DL (ref 65–99)
GLUCOSE UR STRIP-MCNC: ABNORMAL MG/DL
HCT VFR BLD AUTO: 44.8 % (ref 42–52)
HGB BLD-MCNC: 15.4 G/DL (ref 14–18)
HGB UR QL STRIP.AUTO: ABNORMAL
INR PPP: 1.05 (ref 0.9–1.5)
KETONES UR STRIP-MCNC: NEGATIVE MG/DL
LEUKOCYTE ESTERASE UR QL STRIP: NEGATIVE
LIPASE SERPL-CCNC: 33 U/L (ref 11–82)
LYMPHOCYTES # BLD AUTO: 1.9 X3/UL (ref 1.2–4.2)
LYMPHOCYTES NFR BLD AUTO: 13.1 % (ref 20.5–51.1)
MCH RBC QN AUTO: 30.1 PG (ref 26–34)
MCHC RBC AUTO-ENTMCNC: 34.3 G/DL (ref 31–36)
MCV RBC AUTO: 87.9 FL (ref 81–99)
MONOCYTES # BLD AUTO: 1.1 X3/UL (ref 0–1)
MONOCYTES NFR BLD AUTO: 7.7 % (ref 1.7–12)
MUCUS THREADS (HISTORICAL): ABNORMAL /HPF
NEUTROPHILS # BLD AUTO: 10.9 X3/UL (ref 1.4–6.5)
NEUTS SEG NFR BLD AUTO: 76.6 % (ref 42.2–75.2)
NITRITE UR QL STRIP: NEGATIVE
NON-SQ EPI CELLS URNS QL MICRO: ABNORMAL /HPF
OSMOLALITY, SERUM (HISTORICAL): 276 MOSM (ref 262–291)
PH UR STRIP.AUTO: 6.5 [PH] (ref 4.5–8)
PLATELET # BLD AUTO: 266 X3/UL (ref 130–400)
PMV BLD AUTO: 8.3 FL (ref 8.6–11.7)
POTASSIUM SERPL-SCNC: 3.9 MM/L (ref 3.5–5.5)
PROT UR STRIP-MCNC: NEGATIVE MG/DL
PROTHROMBIN TIME (HISTORICAL): 12.2 SEC (ref 10.1–12.9)
RBC # BLD AUTO: 5.1 X6/UL (ref 4.3–5.9)
RBC #/AREA URNS AUTO: ABNORMAL /HPF
SODIUM SERPL-SCNC: 135 MM/L (ref 134–143)
SP GR UR STRIP.AUTO: 1.01 (ref 1–1.03)
TOTAL PROTEIN (HISTORICAL): 7.2 G/DL (ref 6.4–8.9)
UROBILINOGEN UR QL STRIP.AUTO: 0.2 EU/DL (ref 0.2–8)
WBC # BLD AUTO: 14.3 X3/UL (ref 4.8–10.8)
WBC #/AREA URNS AUTO: ABNORMAL /HPF

## 2018-05-04 ENCOUNTER — TELEPHONE (OUTPATIENT)
Dept: INTERNAL MEDICINE CLINIC | Facility: CLINIC | Age: 53
End: 2018-05-04

## 2018-05-04 DIAGNOSIS — I10 BENIGN ESSENTIAL HYPERTENSION: ICD-10-CM

## 2018-05-04 DIAGNOSIS — F41.1 GENERALIZED ANXIETY DISORDER: Primary | ICD-10-CM

## 2018-05-04 RX ORDER — METOPROLOL TARTRATE 50 MG/1
50 TABLET, FILM COATED ORAL 2 TIMES DAILY
Qty: 60 TABLET | Refills: 2 | Status: SHIPPED | OUTPATIENT
Start: 2018-05-04 | End: 2018-10-27

## 2018-05-04 RX ORDER — FUROSEMIDE 40 MG/1
40 TABLET ORAL DAILY
Qty: 30 TABLET | Refills: 2 | Status: SHIPPED | OUTPATIENT
Start: 2018-05-04 | End: 2019-01-15

## 2018-05-04 RX ORDER — CLONIDINE HYDROCHLORIDE 0.3 MG/1
0.3 TABLET ORAL 2 TIMES DAILY
Qty: 60 TABLET | Refills: 0 | Status: SHIPPED | OUTPATIENT
Start: 2018-05-04 | End: 2018-06-21 | Stop reason: SDUPTHER

## 2018-05-08 ENCOUNTER — TRANSITIONAL CARE MANAGEMENT (OUTPATIENT)
Dept: INTERNAL MEDICINE CLINIC | Facility: CLINIC | Age: 53
End: 2018-05-08

## 2018-05-08 DIAGNOSIS — F41.1 GENERALIZED ANXIETY DISORDER: ICD-10-CM

## 2018-05-08 RX ORDER — ALPRAZOLAM 2 MG/1
2 TABLET ORAL 2 TIMES DAILY
Qty: 60 TABLET | Refills: 0 | Status: SHIPPED | OUTPATIENT
Start: 2018-05-08 | End: 2018-05-24 | Stop reason: SDUPTHER

## 2018-05-08 NOTE — TELEPHONE ENCOUNTER
Pt need refill on alprazolam 2mg tablet  Takes 2 x daily    Pharmacy first [facility]    Please advise    [phone]

## 2018-05-24 ENCOUNTER — OFFICE VISIT (OUTPATIENT)
Dept: INTERNAL MEDICINE CLINIC | Facility: CLINIC | Age: 53
End: 2018-05-24

## 2018-05-24 VITALS
HEART RATE: 86 BPM | RESPIRATION RATE: 18 BRPM | TEMPERATURE: 98.5 F | WEIGHT: 298 LBS | HEIGHT: 72 IN | DIASTOLIC BLOOD PRESSURE: 98 MMHG | BODY MASS INDEX: 40.36 KG/M2 | SYSTOLIC BLOOD PRESSURE: 178 MMHG

## 2018-05-24 DIAGNOSIS — M51.37 DEGENERATION OF INTERVERTEBRAL DISC OF LUMBOSACRAL REGION: ICD-10-CM

## 2018-05-24 DIAGNOSIS — F19.20 DRUG DEPENDENCE (HCC): ICD-10-CM

## 2018-05-24 DIAGNOSIS — G57.02 SCIATIC NERVE DISEASE, LEFT: Primary | ICD-10-CM

## 2018-05-24 DIAGNOSIS — F41.1 GENERALIZED ANXIETY DISORDER: ICD-10-CM

## 2018-05-24 PROCEDURE — TCMS TRANSITION OF CARE: Performed by: PHYSICIAN ASSISTANT

## 2018-05-24 PROCEDURE — 80365 DRUG SCREENING OXYCODONE: CPT | Performed by: PHYSICIAN ASSISTANT

## 2018-05-24 PROCEDURE — 80346 BENZODIAZEPINES1-12: CPT | Performed by: PHYSICIAN ASSISTANT

## 2018-05-24 PROCEDURE — 80307 DRUG TEST PRSMV CHEM ANLYZR: CPT | Performed by: PHYSICIAN ASSISTANT

## 2018-05-24 RX ORDER — DULOXETIN HYDROCHLORIDE 60 MG/1
60 CAPSULE, DELAYED RELEASE ORAL DAILY
Qty: 30 CAPSULE | Refills: 3 | Status: SHIPPED | OUTPATIENT
Start: 2018-05-24 | End: 2018-06-21 | Stop reason: SDUPTHER

## 2018-05-24 RX ORDER — OXYCODONE HYDROCHLORIDE 5 MG/1
10 TABLET ORAL EVERY 6 HOURS
COMMUNITY
Start: 2018-05-22 | End: 2018-06-01

## 2018-05-24 RX ORDER — METHOCARBAMOL 500 MG/1
TABLET, FILM COATED ORAL
COMMUNITY
Start: 2018-05-24 | End: 2018-06-01

## 2018-05-24 RX ORDER — ALPRAZOLAM 1 MG/1
1 TABLET ORAL 2 TIMES DAILY
Qty: 60 TABLET | Refills: 0 | Status: SHIPPED | OUTPATIENT
Start: 2018-05-24 | End: 2018-06-21 | Stop reason: SDUPTHER

## 2018-05-24 NOTE — PROGRESS NOTES
Transition of Care  Follow-up After Hospitalization    Marci Beyer 46 y o  male   Date:  5/24/2018    Patient was hopsitalized at:  [facility]  Date of admission:  5/4/18  Date of discharge:  5/8/18  Diagnosis:  siactica        Admit Date: 5/4/18  Discharge Date: 5/8/18  Diagnosis: sciatica  Location: [facility] were reviewed  Medications upon discharge reviewed/updated  Medication Changes: none  Imaging: MRI L-spine  Consults:   Discharge Disposition:  home  Follow up visits with other specialists: pain management at [facility] next week      Assessment and Plan:    Nikkie Damon was seen today for follow-up  Diagnoses and all orders for this visit:    Drug dependence ([address] )  -     Oxycodone/Oxymorphone urine  -     Toxicology screen, urine  -     Cancel: Alprazolam, Quantitative Urine  -     Alprazolam, Quantitative Urine            HPI:  Pt presents for a ROSE  He was admitted at [facility] for intense back pain and was diagnosed with left sided sciatica  He underwent an MRI of the lumbar spine which showed post surgical changes, disc bulging and protrusions and central canal as well as foraminal stenosis  He was given oxycodone 5mg tabs which he has been on for the last several months  He is also on robaxin and high dose lyrica and notes ongoing intense pain  He has tried steroids and toradol in the past without relief as well as epidural injections with his previous pain specialist, Dr Sandoval Sebastien  He is set up for [facility] pain management next week and will get a repeat MRI in 4 weeks  Upon review of the [facility] website pt has had 146 oxycodone tabs prescribed since 5/10/18 which should be a 47 day supply  He currently has 3 pills left and it has been 14 days since these were prescribed  The patient and I had a wiley discussion about his medication overuse  We also discussed the dangers of using this in combination with his high dose xanax  Pt is aware and also expresses concern   He states he has been desperate for relief which is why he has taken so much  He initially asked for an rx for the pain medicine until he can be seen by pain management but after our discussion he is interested in pursuing options to help avoid significant withdrawal and detox  He appears to be in moderate withdrawal presently with intense diaphoresis and fidgeting  BP very elevated as well  ROS: Review of Systems   Constitutional: Positive for diaphoresis  Negative for chills and fever  HENT: Negative for congestion, ear pain, hearing loss, postnasal drip, rhinorrhea, sinus pain, sinus pressure, sore throat and trouble swallowing  Eyes: Negative for pain and visual disturbance  Respiratory: Negative for cough, chest tightness, shortness of breath and wheezing  Cardiovascular: Negative  Negative for chest pain, palpitations and leg swelling  Gastrointestinal: Negative for abdominal pain, blood in stool, constipation, diarrhea, nausea and vomiting  Endocrine: Negative for cold intolerance, heat intolerance, polydipsia, polyphagia and polyuria  Genitourinary: Negative for difficulty urinating, dysuria, flank pain and urgency  Musculoskeletal: Positive for back pain and gait problem  Negative for arthralgias and myalgias  Skin: Negative for rash  Allergic/Immunologic: Negative  Neurological: Negative for dizziness, weakness, light-headedness and headaches  Hematological: Negative  Psychiatric/Behavioral: Negative for behavioral problems, dysphoric mood and sleep disturbance  The patient is not nervous/anxious          Past Medical History:   Diagnosis Date    Chest pain     Last Assessed: 10/5/2016    Hypertension     Pleural effusion     Last Assessed: 9/15/2017    Pneumonia     Sciatica        Past Surgical History:   Procedure Laterality Date    CHEST TUBE INSERTION      Last Assessed: 9/15/2017    HERNIA REPAIR  2011    LUMBAR LAMINECTOMY  2009    TONSILLECTOMY      Last Assessed: 9/15/2017       Social History     Social History    Marital status: Single     Spouse name: N/A    Number of children: 1    Years of education: N/A     Social History Main Topics    Smoking status: Current Every Day Smoker    Smokeless tobacco: Never Used    Alcohol use No      Comment: Former consumption of alcohol    Drug use: Yes     Types: Marijuana    Sexual activity: Not Asked     Other Topics Concern    None     Social History Narrative    Caffeine use    Chronic Narcotic use    Lives with adult children    On disability    Denied: seeing a dentist           Family History   Problem Relation Age of Onset    Other Mother      Cardiac Disorder    Heart failure Mother     Diabetes Mother     Cancer Father     Liver cancer Father     Cancer Brother     Testicular cancer Brother     Lung cancer Brother     Liver cancer Family        Allergies   Allergen Reactions    Codeine Itching         Current Outpatient Prescriptions:     ALPRAZolam (XANAX) 2 MG tablet, Take 1 tablet (2 mg total) by mouth 2 (two) times a day, Disp: 60 tablet, Rfl: 0    cloNIDine (CATAPRES) 0 3 mg tablet, Take 1 tablet (0 3 mg total) by mouth 2 (two) times a day, Disp: 60 tablet, Rfl: 0    furosemide (LASIX) 40 mg tablet, Take 1 tablet (40 mg total) by mouth daily, Disp: 30 tablet, Rfl: 2    methocarbamol (ROBAXIN) 500 mg tablet, , Disp: , Rfl:     metoprolol tartrate (LOPRESSOR) 50 mg tablet, Take 1 tablet (50 mg total) by mouth 2 (two) times a day, Disp: 60 tablet, Rfl: 2    oxyCODONE (ROXICODONE) 5 mg immediate release tablet, Take 10 mg by mouth every 6 (six) hours, Disp: , Rfl:     pregabalin (LYRICA) 200 MG capsule, Take 1 capsule by mouth 2 (two) times a day, Disp: , Rfl:     tamsulosin (FLOMAX) 0 4 mg, Take 1 capsule by mouth daily, Disp: , Rfl:       Physical Exam:  BP (!) 178/98   Pulse 86   Temp 98 5 °F (36 9 °C) (Tympanic)   Resp 18   Ht 6' (1 829 m)   Wt 135 kg (298 lb)   BMI 40 42 kg/m²     Physical Exam Constitutional: He is oriented to person, place, and time  He appears well-developed and well-nourished  No distress  HENT:   Head: Normocephalic and atraumatic  Right Ear: External ear normal    Left Ear: External ear normal    Nose: Nose normal    Mouth/Throat: Oropharynx is clear and moist  No oropharyngeal exudate  Eyes: Conjunctivae and EOM are normal  Pupils are equal, round, and reactive to light  Right eye exhibits no discharge  Left eye exhibits no discharge  No scleral icterus  Neck: Normal range of motion  Neck supple  No thyromegaly present  Cardiovascular: Normal rate, regular rhythm and normal heart sounds  Exam reveals no gallop and no friction rub  No murmur heard  Pulmonary/Chest: Effort normal and breath sounds normal  No respiratory distress  He has no wheezes  He has no rales  Abdominal: Soft  Bowel sounds are normal  He exhibits no distension  There is no tenderness  Musculoskeletal: Normal range of motion  He exhibits no edema, tenderness or deformity  Neurological: He is alert and oriented to person, place, and time  No cranial nerve deficit  Skin: Skin is warm and dry  He is not diaphoretic  Psychiatric: He has a normal mood and affect  His behavior is normal  Judgment and thought content normal            Labs:  No results found for: WBC, HGB, HCT, MCV, PLT  Lab Results   Component Value Date     01/15/2018    K 4 8 01/15/2018     01/15/2018    CO2 33 (H) 01/15/2018    ANIONGAP 3 (L) 01/15/2018    BUN 15 01/15/2018    CREATININE 1 08 01/15/2018    GLUCOSE 136 01/15/2018    CALCIUM 9 7 01/15/2018    AST 29 01/15/2018    ALT 38 01/15/2018    ALKPHOS 76 01/15/2018    PROT 7 5 01/15/2018    BILITOT 0 46 01/15/2018    EGFR 78 01/15/2018       A/P:  Persistent L-sided sciatica: Continue robaxin and lyrica  Continue upcoming appt with pain management  No opiates provided today  Pt deferred course of steroids or toradol due to poor success previously   I spoke with Dr Chema Curtis regarding the patient and he will take the patient on his suboxone program tomorrow  Pt aware and appreciative  Anxiety: Will decrease pts current dose xanax from 2mg BID to 1mg BID  Goal will be to further taper  Will start cymbalta to combat anxiety and also aid in chronic pain and neuropathy symptoms

## 2018-05-25 ENCOUNTER — OFFICE VISIT (OUTPATIENT)
Dept: INTERNAL MEDICINE CLINIC | Facility: CLINIC | Age: 53
End: 2018-05-25
Payer: MEDICARE

## 2018-05-25 VITALS
HEIGHT: 72 IN | HEART RATE: 77 BPM | DIASTOLIC BLOOD PRESSURE: 96 MMHG | RESPIRATION RATE: 20 BRPM | WEIGHT: 296.8 LBS | SYSTOLIC BLOOD PRESSURE: 172 MMHG | TEMPERATURE: 98.4 F | OXYGEN SATURATION: 98 % | BODY MASS INDEX: 40.2 KG/M2

## 2018-05-25 DIAGNOSIS — F19.20 DRUG DEPENDENCE (HCC): Primary | ICD-10-CM

## 2018-05-25 DIAGNOSIS — IMO0002 HISTORY OF PRESCRIPTION DRUG ABUSE: ICD-10-CM

## 2018-05-25 PROBLEM — Z79.899 ENCOUNTER FOR MONITORING SUBOXONE MAINTENANCE THERAPY: Status: ACTIVE | Noted: 2018-05-25

## 2018-05-25 PROBLEM — Z79.899 DRUG THERAPY CONTINUED: Status: ACTIVE | Noted: 2018-05-25

## 2018-05-25 PROBLEM — Z51.81 ENCOUNTER FOR MONITORING SUBOXONE MAINTENANCE THERAPY: Status: ACTIVE | Noted: 2018-05-25

## 2018-05-25 PROCEDURE — 99214 OFFICE O/P EST MOD 30 MIN: CPT | Performed by: INTERNAL MEDICINE

## 2018-05-25 PROCEDURE — 80307 DRUG TEST PRSMV CHEM ANLYZR: CPT | Performed by: INTERNAL MEDICINE

## 2018-05-25 RX ORDER — BUPRENORPHINE HYDROCHLORIDE AND NALOXONE HYDROCHLORIDE DIHYDRATE 8; 2 MG/1; MG/1
TABLET SUBLINGUAL
Qty: 14 TABLET | Refills: 0 | Status: SHIPPED | OUTPATIENT
Start: 2018-05-25 | End: 2018-06-01

## 2018-05-25 RX ORDER — BUPRENORPHINE HYDROCHLORIDE AND NALOXONE HYDROCHLORIDE DIHYDRATE 8; 2 MG/1; MG/1
TABLET SUBLINGUAL
Qty: 2 TABLET | Refills: 0 | Status: SHIPPED | OUTPATIENT
Start: 2018-05-25 | End: 2018-05-25

## 2018-05-25 NOTE — PROGRESS NOTES
Assessment/Plan:    No problem-specific Assessment & Plan notes found for this encounter  Diagnoses and all orders for this visit:    Drug dependence [facility])  -     Toxicology screen, urine  -     Discontinue: buprenorphine-naloxone (SUBOXONE) 8-2 mg per SL tablet; Return to office with sample to be dispensed  -     buprenorphine-naloxone (SUBOXONE) 8-2 mg per SL tablet; One or two sl q day  History of prescription drug abuse ([facility] )      A/P: Pt RTC with the med and dosed  After about 15 minutes, started to feel better and no side effects  Will start 16mg tabs, dose 1/6 and get into counseling  Instructed to keep meds safe and out of reach of children  RTC one week  Subjective:      Patient ID: Tyesha Azul is a 46 y o  male  WM, referred by our PA, due to prescription drug dependency  Pt reports suffering a major back injury seven years ago and was placed on chronic percocet  Pt quickly became addicted and was abusing 10/325 tabs nasally  Would go through 120 in less than two weeks  Denies any issues with the law  No prior detox, but several withdraws in the past  No counseling  Last used about 36 hours ago and in withdraw currently  Has also used cocaine in the distant past  Still uses THC at times  UDT obtained and sent to the lab  The following portions of the patient's history were reviewed and updated as appropriate:   He  has a past medical history of Arthritis; Chest pain; Hypertension; Kidney stone; Obesity; Pleural effusion; Pneumonia; and Sciatica    He   Patient Active Problem List    Diagnosis Date Noted    Encounter for monitoring Suboxone maintenance therapy 05/25/2018    Drug therapy continued 05/25/2018    History of prescription drug abuse ([facility] ) 05/25/2018    Sciatic nerve disease, left 05/24/2018    Drug dependence [facility] ) 05/24/2018    Benign prostate hyperplasia 01/15/2018    Gallbladder polyp 09/15/2017    Hepatic steatosis 09/15/2017    Nephrolithiasis 09/15/2017  Obesity 09/15/2017    Tobacco use disorder 09/15/2017    Benign essential hypertension 10/05/2015    Degeneration of intervertebral disc of lumbosacral region 10/05/2015    Generalized anxiety disorder 10/05/2015     He  has a past surgical history that includes Chest tube insertion; Hernia repair (2011); Lumbar laminectomy (2009); and Tonsillectomy  His family history includes Cancer in his brother and father; Diabetes in his mother; Heart failure in his mother; Liver cancer in his family and father; Lung cancer in his brother; Other in his mother; Testicular cancer in his brother  He  reports that he has been smoking  He has never used smokeless tobacco  He reports that he uses drugs, including Marijuana  He reports that he does not drink alcohol  Current Outpatient Prescriptions   Medication Sig Dispense Refill    ALPRAZolam (XANAX) 1 mg tablet Take 1 tablet (1 mg total) by mouth 2 (two) times a day 60 tablet 0    cloNIDine (CATAPRES) 0 3 mg tablet Take 1 tablet (0 3 mg total) by mouth 2 (two) times a day 60 tablet 0    DULoxetine (CYMBALTA) 60 mg delayed release capsule Take 1 capsule (60 mg total) by mouth daily 30 capsule 3    furosemide (LASIX) 40 mg tablet Take 1 tablet (40 mg total) by mouth daily 30 tablet 2    methocarbamol (ROBAXIN) 500 mg tablet       metoprolol tartrate (LOPRESSOR) 50 mg tablet Take 1 tablet (50 mg total) by mouth 2 (two) times a day 60 tablet 2    oxyCODONE (ROXICODONE) 5 mg immediate release tablet Take 10 mg by mouth every 6 (six) hours      pregabalin (LYRICA) 200 MG capsule Take 1 capsule by mouth 2 (two) times a day      tamsulosin (FLOMAX) 0 4 mg Take 1 capsule by mouth daily      buprenorphine-naloxone (SUBOXONE) 8-2 mg per SL tablet One or two sl q day  14 tablet 0     No current facility-administered medications for this visit        Current Outpatient Prescriptions on File Prior to Visit   Medication Sig    ALPRAZolam (XANAX) 1 mg tablet Take 1 tablet (1 mg total) by mouth 2 (two) times a day    cloNIDine (CATAPRES) 0 3 mg tablet Take 1 tablet (0 3 mg total) by mouth 2 (two) times a day    DULoxetine (CYMBALTA) 60 mg delayed release capsule Take 1 capsule (60 mg total) by mouth daily    furosemide (LASIX) 40 mg tablet Take 1 tablet (40 mg total) by mouth daily    methocarbamol (ROBAXIN) 500 mg tablet     metoprolol tartrate (LOPRESSOR) 50 mg tablet Take 1 tablet (50 mg total) by mouth 2 (two) times a day    oxyCODONE (ROXICODONE) 5 mg immediate release tablet Take 10 mg by mouth every 6 (six) hours    pregabalin (LYRICA) 200 MG capsule Take 1 capsule by mouth 2 (two) times a day    tamsulosin (FLOMAX) 0 4 mg Take 1 capsule by mouth daily     No current facility-administered medications on file prior to visit  He is allergic to codeine       Review of Systems   Constitutional: Positive for chills, diaphoresis and fatigue  Negative for activity change and fever  HENT: Positive for rhinorrhea  Eyes: Negative for visual disturbance  Respiratory: Negative for cough, chest tightness, shortness of breath and wheezing  Cardiovascular: Positive for palpitations  Negative for chest pain and leg swelling  Gastrointestinal: Positive for abdominal pain, diarrhea and nausea  Negative for constipation and vomiting  Genitourinary: Negative for difficulty urinating, dysuria and frequency  Musculoskeletal: Positive for arthralgias  Negative for gait problem and myalgias  Neurological: Negative for dizziness, seizures, facial asymmetry, weakness, light-headedness and headaches  Psychiatric/Behavioral: Positive for sleep disturbance  Negative for confusion, dysphoric mood, hallucinations, self-injury and suicidal ideas  The patient is nervous/anxious            Objective:      BP (!) 172/96 (BP Location: Left arm, Patient Position: Sitting, Cuff Size: Extra-Large)   Pulse 77   Temp 98 4 °F (36 9 °C)   Resp 20   Ht 6' (1 829 m)   Wt 135 kg (296 lb 12 8 oz)   SpO2 98%   BMI 40 25 kg/m²          Physical Exam   Constitutional: He is oriented to person, place, and time  He appears well-developed and well-nourished  He appears distressed  HENT:   Head: Normocephalic and atraumatic  Mouth/Throat: Oropharynx is clear and moist  No oropharyngeal exudate  Eyes: Conjunctivae and EOM are normal  Pupils are equal, round, and reactive to light  Cardiovascular: Normal rate, regular rhythm and normal heart sounds  No murmur heard  Pulmonary/Chest: Effort normal and breath sounds normal  No respiratory distress  He has no wheezes  Abdominal: Soft  Bowel sounds are normal  There is no tenderness  Neurological: He is alert and oriented to person, place, and time  Psychiatric: He has a normal mood and affect  His behavior is normal  Judgment and thought content normal    Nursing note and vitals reviewed

## 2018-05-25 NOTE — PATIENT INSTRUCTIONS
Buprenorphine/Naloxone (Into the mouth)   Buprenorphine (bue-pre-NOR-feen), Naloxone (nal-OX-one)  Treats narcotic dependence  Brand Name(s): Bunavail, Suboxone, Zubsolv   There may be other brand names for this medicine  When This Medicine Should Not Be Used: This medicine is not right for everyone  Do not use it if you had an allergic reaction to buprenorphine or naloxone  How to Use This Medicine: Thin Sheet, Tablet  · Take your medicine as directed  Your dose may need to be changed several times to find what works best for you  · You must let the medicine dissolve  Never swallow the film or tablet  Your body may not absorb enough of the medicine if you swallow it  · Your health caregiver should show you how to use the medicine  If you do not understand, ask for help  It is important to use the medicine correctly  · Do not talk while the medicine is in your mouth  · Buccal film: Rinse your mouth with water to moisten it  Place the film against the inside of your cheek  If your doctor told you to use more than 1 film, place the second film inside your other cheek  Do not place more than 2 films inside of 1 cheek at a time  Do not move or touch the film  Do not eat or drink anything until the film is completely dissolved  · Sublingual tablet: Place the tablet under your tongue  If your doctor told you to use more than 1 tablet, place all of the tablets in different places under your tongue at the same time  You can use 2 tablets at a time until you have taken all of the medicine, if that is easier for you  Let the tablets dissolve completely in your mouth  Do not eat or drink anything until the tablets are completely dissolved  · Sublingual film: Drink some water to help moisten your mouth  Place the film under your tongue  If your doctor told you to use more than 1 film, place the second film on the opposite side from the first one  Do not move the film after you place it under your tongue   If you are supposed to use more than 2 films, use them the same way, but do not start until the first 2 films are completely dissolved  · Do not break, crush, chew, or cut the film or tablet  · This medicine should come with a Medication Guide  Ask your pharmacist for a copy if you do not have one  · Missed dose: Take a dose as soon as you remember  If it is almost time for your next dose, wait until then and take a regular dose  Do not take extra medicine to make up for a missed dose  · Store the medicine in a closed container at room temperature, away from heat, moisture, and direct light  Ask your pharmacist about the best way to dispose of medicine you do not use  Drugs and Foods to Avoid:   Ask your doctor or pharmacist before using any other medicine, including over-the-counter medicines, vitamins, and herbal products  · Do not use this medicine if you are using or have used an MAO inhibitor within the past 14 days  · Some medicines can affect how buprenorphine/naloxone works  Tell your doctor if you are using the following:   ¨ Carbamazepine, erythromycin, ketoconazole, mirtazapine, phenobarbital, phenytoin, rifampin, tramadol, or trazodone  ¨ Medicine to treat depression  ¨ Medicine to treat HIV/AIDS (including atazanavir, delavirdine, efavirenz, etravirine, nevirapine, ritonavir)  ¨ Phenothiazine medicine  · Do not drink alcohol while you are using this medicine  · Tell your doctor if you use anything else that makes you sleepy  Some examples are allergy medicine, narcotic pain medicine, and alcohol  Tell your doctor if you are also using butorphanol, nalbuphine, pentazocine, or a muscle relaxer    Warnings While Using This Medicine:   · Tell your doctor if you are pregnant or breastfeeding, or if you have kidney disease, liver disease (including hepatitis), lung or breathing problems, adrenal gland problems, an enlarged prostate, trouble urinating, gallbladder problems, low thyroid levels, stomach problems, or a history of depression, brain tumor, head injury, alcohol or drug abuse  · This medicine may cause the following problems:  ¨ High risk of overdose, which can lead to death  ¨ Respiratory depression (serious breathing problem that can be life-threatening)  ¨ Liver problems  ¨ Serotonin syndrome, when used with certain medicines  · This medicine may make you dizzy or drowsy  Do not drive or do anything that could be dangerous until you know how this medicine affects you  Stand or sit up slowly if you feel lightheaded or dizzy  · Tell any doctor or dentist who treats you that you are using this medicine  · This medicine can be habit-forming  Do not use more than your prescribed dose  Call your doctor if you think your medicine is not working  · Do not stop using this medicine suddenly  Your doctor will need to slowly decrease your dose before you stop it completely  · This medicine could cause infertility  Talk with your doctor before using this medicine if you plan to have children  · Keep all medicine out of the reach of children  Never share your medicine with anyone    Possible Side Effects While Using This Medicine:   Call your doctor right away if you notice any of these side effects:  · Allergic reaction: Itching or hives, swelling in your face or hands, swelling or tingling in your mouth or throat, chest tightness, trouble breathing  · Blue lips, fingernails, or skin  · Dark urine or pale stools, nausea, vomiting, loss of appetite, stomach pain, yellow skin or eyes  · Extreme dizziness or weakness, shallow breathing, sweating, seizures, cold or clammy skin  · Severe confusion, lightheadedness, dizziness, or fainting  · Trouble breathing or slow breathing  If you notice these less serious side effects, talk with your doctor:   · Headache, trouble sleeping  · Constipation or upset stomach  · Shaking, feeling hot or cold, runny nose, watery eyes, diarrhea, vomiting, and muscle aches  If you notice other side effects that you think are caused by this medicine, tell your doctor  Call your doctor for medical advice about side effects  You may report side effects to FDA at 2-094-IWT-0198  © 2017 [address] Information is for End User's use only and may not be sold, redistributed or otherwise used for commercial purposes  The above information is an  only  It is not intended as medical advice for individual conditions or treatments  Talk to your doctor, nurse or pharmacist before following any medical regimen to see if it is safe and effective for you

## 2018-05-29 ENCOUNTER — TELEPHONE (OUTPATIENT)
Dept: INTERNAL MEDICINE CLINIC | Facility: CLINIC | Age: 53
End: 2018-05-29

## 2018-05-29 PROBLEM — F41.9 ANXIETY: Status: ACTIVE | Noted: 2018-05-02

## 2018-05-29 NOTE — TELEPHONE ENCOUNTER
Pt called, was seen for suboxone, stated he called the ins co and the auth was denied, was upset, told him he would have to pay for them, pt upset, stated he cant afford it, has no money, asked him if he heard from our office if it was denied, pt said no, checked with the MA'S, Doris Cisneros is still in progress, had to resend the info to the ins, told pt to keep his 1 wk fu appt

## 2018-05-30 LAB
OXYCODONE UR QL CFM: 3467 NG/ML
OXYCODONE+OXYMORPHONE SERPLBLD QL SCN: POSITIVE
OXYCODONE+OXYMORPHONE UR QL SCN: NORMAL NG/ML
OXYCODONE+OXYMORPHONE UR QL SCN: POSITIVE
OXYMORPHONE UR CFM-MCNC: 4877 NG/ML
OXYMORPHONE UR QL CFM: POSITIVE

## 2018-05-31 LAB
AMPHETAMINES UR QL SCN: NEGATIVE NG/ML
BARBITURATES UR QL SCN: NEGATIVE NG/ML
BENZODIAZ UR QL SCN: NEGATIVE
BZE UR QL: NEGATIVE NG/ML
CANNABINOIDS UR QL SCN: NEGATIVE NG/ML
METHADONE UR QL SCN: NEGATIVE NG/ML
OPIATES UR QL: NEGATIVE
PCP UR QL: NEGATIVE NG/ML
PROPOXYPH UR QL: NEGATIVE NG/ML

## 2018-06-01 ENCOUNTER — OFFICE VISIT (OUTPATIENT)
Dept: INTERNAL MEDICINE CLINIC | Facility: CLINIC | Age: 53
End: 2018-06-01
Payer: MEDICARE

## 2018-06-01 VITALS
HEIGHT: 72 IN | BODY MASS INDEX: 40.09 KG/M2 | SYSTOLIC BLOOD PRESSURE: 136 MMHG | RESPIRATION RATE: 18 BRPM | TEMPERATURE: 98.2 F | WEIGHT: 296 LBS | HEART RATE: 88 BPM | DIASTOLIC BLOOD PRESSURE: 84 MMHG

## 2018-06-01 DIAGNOSIS — IMO0002 HISTORY OF PRESCRIPTION DRUG ABUSE: ICD-10-CM

## 2018-06-01 DIAGNOSIS — F19.20 DRUG DEPENDENCE (HCC): Primary | ICD-10-CM

## 2018-06-01 DIAGNOSIS — Z51.81 ENCOUNTER FOR MONITORING SUBOXONE MAINTENANCE THERAPY: ICD-10-CM

## 2018-06-01 DIAGNOSIS — Z79.899 ENCOUNTER FOR MONITORING SUBOXONE MAINTENANCE THERAPY: ICD-10-CM

## 2018-06-01 DIAGNOSIS — Z79.899 DRUG THERAPY CONTINUED: ICD-10-CM

## 2018-06-01 LAB
AMPHETAMINES UR QL SCN: NEGATIVE NG/ML
BARBITURATES UR QL SCN: NEGATIVE NG/ML
BENZODIAZ UR QL SCN: POSITIVE
BZE UR QL: NEGATIVE NG/ML
CANNABINOIDS UR QL SCN: NEGATIVE NG/ML
METHADONE UR QL SCN: NEGATIVE NG/ML
OPIATES UR QL: NEGATIVE
PCP UR QL: NEGATIVE NG/ML
PROPOXYPH UR QL: NEGATIVE NG/ML

## 2018-06-01 PROCEDURE — 80307 DRUG TEST PRSMV CHEM ANLYZR: CPT | Performed by: INTERNAL MEDICINE

## 2018-06-01 PROCEDURE — 99213 OFFICE O/P EST LOW 20 MIN: CPT | Performed by: INTERNAL MEDICINE

## 2018-06-01 RX ORDER — BUPRENORPHINE HYDROCHLORIDE AND NALOXONE HYDROCHLORIDE DIHYDRATE 8; 2 MG/1; MG/1
TABLET SUBLINGUAL
Qty: 60 TABLET | Refills: 0 | Status: SHIPPED | OUTPATIENT
Start: 2018-06-01 | End: 2018-07-02 | Stop reason: SDUPTHER

## 2018-06-01 NOTE — PROGRESS NOTES
Assessment/Plan:    No problem-specific Assessment & Plan notes found for this encounter  Diagnoses and all orders for this visit:    Drug dependence ([place] [id number] )  -     Suboxone  -     Toxicology screen, urine  -     buprenorphine-naloxone (SUBOXONE) 8-2 mg per SL tablet; Two pills sl q day  Drug therapy continued  -     Suboxone  -     Toxicology screen, urine  -     buprenorphine-naloxone (SUBOXONE) 8-2 mg per SL tablet; Two pills sl q day  Encounter for monitoring Suboxone maintenance therapy  -     Suboxone  -     Toxicology screen, urine  -     buprenorphine-naloxone (SUBOXONE) 8-2 mg per SL tablet; Two pills sl q day  History of prescription drug abuse ([place] [id number] )  -     Suboxone  -     Toxicology screen, urine  -     buprenorphine-naloxone (SUBOXONE) 8-2 mg per SL tablet; Two pills sl q day  A/P: Doing well and will continue with 16mg tabs, dose 1/6 and get into counseling  Reminded to keep meds safe and out of reach of children  RTC four weeks  Subjective:      Patient ID: Samantha Hutchinson is a 46 y o  male  WM RTC for f/u suboxone  Doing well and no c/o's  Not using and no withdraw  Looking into counseling  UDT obtained and sent to the lab  Tolerating the meds and no side effects  The following portions of the patient's history were reviewed and updated as appropriate:   He  has a past medical history of Anxiety (5/2/2018); Arthritis; Chest pain; Hypertension; Kidney stone; Obesity; Pleural effusion; Pneumonia; and Sciatica    He   Patient Active Problem List    Diagnosis Date Noted    Encounter for monitoring Suboxone maintenance therapy 05/25/2018    Drug therapy continued 05/25/2018    History of prescription drug abuse ([place] [address] ) 05/25/2018    Sciatic nerve disease, left 05/24/2018    Drug dependence ([place] [address] ) 05/24/2018    Anxiety 05/02/2018    Benign prostate hyperplasia 01/15/2018    Gallbladder polyp 09/15/2017    Hepatic steatosis 09/15/2017    Nephrolithiasis 09/15/2017    Obesity 09/15/2017    Tobacco use disorder 09/15/2017    Essential hypertension 10/05/2015    Degeneration of intervertebral disc of lumbosacral region 10/05/2015    Generalized anxiety disorder 10/05/2015     He  has a past surgical history that includes Chest tube insertion; Hernia repair (2011); Lumbar laminectomy (2009); and Tonsillectomy  His family history includes Cancer in his brother and father; Diabetes in his mother; Heart failure in his mother; Liver cancer in his family and father; Lung cancer in his brother; Other in his mother; Testicular cancer in his brother  He  reports that he has been smoking  He has never used smokeless tobacco  He reports that he uses drugs, including Marijuana  He reports that he does not drink alcohol  Current Outpatient Prescriptions   Medication Sig Dispense Refill    ALPRAZolam (XANAX) 1 mg tablet Take 1 tablet (1 mg total) by mouth 2 (two) times a day 60 tablet 0    cloNIDine (CATAPRES) 0 3 mg tablet Take 1 tablet (0 3 mg total) by mouth 2 (two) times a day 60 tablet 0    DULoxetine (CYMBALTA) 60 mg delayed release capsule Take 1 capsule (60 mg total) by mouth daily 30 capsule 3    furosemide (LASIX) 40 mg tablet Take 1 tablet (40 mg total) by mouth daily 30 tablet 2    metoprolol tartrate (LOPRESSOR) 50 mg tablet Take 1 tablet (50 mg total) by mouth 2 (two) times a day 60 tablet 2    pregabalin (LYRICA) 200 MG capsule Take 1 capsule by mouth 2 (two) times a day      tamsulosin (FLOMAX) 0 4 mg Take 1 capsule by mouth daily      buprenorphine-naloxone (SUBOXONE) 8-2 mg per SL tablet Two pills sl q day  60 tablet 0     No current facility-administered medications for this visit        Current Outpatient Prescriptions on File Prior to Visit   Medication Sig    ALPRAZolam (XANAX) 1 mg tablet Take 1 tablet (1 mg total) by mouth 2 (two) times a day    cloNIDine (CATAPRES) 0 3 mg tablet Take 1 tablet (0 3 mg total) by mouth 2 (two) times a day    DULoxetine (CYMBALTA) 60 mg delayed release capsule Take 1 capsule (60 mg total) by mouth daily    furosemide (LASIX) 40 mg tablet Take 1 tablet (40 mg total) by mouth daily    metoprolol tartrate (LOPRESSOR) 50 mg tablet Take 1 tablet (50 mg total) by mouth 2 (two) times a day    pregabalin (LYRICA) 200 MG capsule Take 1 capsule by mouth 2 (two) times a day    tamsulosin (FLOMAX) 0 4 mg Take 1 capsule by mouth daily    [DISCONTINUED] buprenorphine-naloxone (SUBOXONE) 8-2 mg per SL tablet One or two sl q day   [DISCONTINUED] methocarbamol (ROBAXIN) 500 mg tablet     [DISCONTINUED] oxyCODONE (ROXICODONE) 5 mg immediate release tablet Take 10 mg by mouth every 6 (six) hours     No current facility-administered medications on file prior to visit  He is allergic to codeine       Review of Systems   Constitutional: Negative for activity change, chills, diaphoresis, fatigue and fever  Respiratory: Negative for cough, chest tightness, shortness of breath and wheezing  Cardiovascular: Negative for chest pain, palpitations and leg swelling  Gastrointestinal: Negative for abdominal pain, constipation, diarrhea, nausea and vomiting  Genitourinary: Negative for difficulty urinating, dysuria and frequency  Musculoskeletal: Negative for arthralgias, gait problem and myalgias  Neurological: Negative for dizziness, seizures, weakness, light-headedness and headaches  Psychiatric/Behavioral: Negative for confusion, dysphoric mood, self-injury, sleep disturbance and suicidal ideas  The patient is not nervous/anxious  Objective:      /84   Pulse 88   Temp 98 2 °F (36 8 °C) (Tympanic)   Resp 18   Ht 6' (1 829 m)   Wt 134 kg (296 lb)   BMI 40 14 kg/m²          Physical Exam   Constitutional: He is oriented to person, place, and time  He appears well-developed and well-nourished  No distress  HENT:   Head: Normocephalic and atraumatic     Mouth/Throat: Oropharynx is clear and moist    Eyes: Conjunctivae and EOM are normal  Pupils are equal, round, and reactive to light  Cardiovascular: Normal rate, regular rhythm and normal heart sounds  Pulmonary/Chest: Effort normal and breath sounds normal  He has no wheezes  Abdominal: Soft  Bowel sounds are normal  There is no tenderness  Neurological: He is alert and oriented to person, place, and time  Psychiatric: He has a normal mood and affect  His behavior is normal  Judgment and thought content normal    Nursing note and vitals reviewed

## 2018-06-01 NOTE — PATIENT INSTRUCTIONS
Buprenorphine/Naloxone (Into the mouth)   Buprenorphine (bue-pre-NOR-feen), Naloxone (nal-OX-one)  Treats narcotic dependence  Brand Name(s): Bunavail, Suboxone, Zubsolv   There may be other brand names for this medicine  When This Medicine Should Not Be Used: This medicine is not right for everyone  Do not use it if you had an allergic reaction to buprenorphine or naloxone  How to Use This Medicine: Thin Sheet, Tablet  · Take your medicine as directed  Your dose may need to be changed several times to find what works best for you  · You must let the medicine dissolve  Never swallow the film or tablet  Your body may not absorb enough of the medicine if you swallow it  · Your health caregiver should show you how to use the medicine  If you do not understand, ask for help  It is important to use the medicine correctly  · Do not talk while the medicine is in your mouth  · Buccal film: Rinse your mouth with water to moisten it  Place the film against the inside of your cheek  If your doctor told you to use more than 1 film, place the second film inside your other cheek  Do not place more than 2 films inside of 1 cheek at a time  Do not move or touch the film  Do not eat or drink anything until the film is completely dissolved  · Sublingual tablet: Place the tablet under your tongue  If your doctor told you to use more than 1 tablet, place all of the tablets in different places under your tongue at the same time  You can use 2 tablets at a time until you have taken all of the medicine, if that is easier for you  Let the tablets dissolve completely in your mouth  Do not eat or drink anything until the tablets are completely dissolved  · Sublingual film: Drink some water to help moisten your mouth  Place the film under your tongue  If your doctor told you to use more than 1 film, place the second film on the opposite side from the first one  Do not move the film after you place it under your tongue   If you are supposed to use more than 2 films, use them the same way, but do not start until the first 2 films are completely dissolved  · Do not break, crush, chew, or cut the film or tablet  · This medicine should come with a Medication Guide  Ask your pharmacist for a copy if you do not have one  · Missed dose: Take a dose as soon as you remember  If it is almost time for your next dose, wait until then and take a regular dose  Do not take extra medicine to make up for a missed dose  · Store the medicine in a closed container at room temperature, away from heat, moisture, and direct light  Ask your pharmacist about the best way to dispose of medicine you do not use  Drugs and Foods to Avoid:   Ask your doctor or pharmacist before using any other medicine, including over-the-counter medicines, vitamins, and herbal products  · Do not use this medicine if you are using or have used an MAO inhibitor within the past 14 days  · Some medicines can affect how buprenorphine/naloxone works  Tell your doctor if you are using the following:   ¨ Carbamazepine, erythromycin, ketoconazole, mirtazapine, phenobarbital, phenytoin, rifampin, tramadol, or trazodone  ¨ Medicine to treat depression  ¨ Medicine to treat HIV/AIDS (including atazanavir, delavirdine, efavirenz, etravirine, nevirapine, ritonavir)  ¨ Phenothiazine medicine  · Do not drink alcohol while you are using this medicine  · Tell your doctor if you use anything else that makes you sleepy  Some examples are allergy medicine, narcotic pain medicine, and alcohol  Tell your doctor if you are also using butorphanol, nalbuphine, pentazocine, or a muscle relaxer    Warnings While Using This Medicine:   · Tell your doctor if you are pregnant or breastfeeding, or if you have kidney disease, liver disease (including hepatitis), lung or breathing problems, adrenal gland problems, an enlarged prostate, trouble urinating, gallbladder problems, low thyroid levels, stomach problems, or a history of depression, brain tumor, head injury, alcohol or drug abuse  · This medicine may cause the following problems:  ¨ High risk of overdose, which can lead to death  ¨ Respiratory depression (serious breathing problem that can be life-threatening)  ¨ Liver problems  ¨ Serotonin syndrome, when used with certain medicines  · This medicine may make you dizzy or drowsy  Do not drive or do anything that could be dangerous until you know how this medicine affects you  Stand or sit up slowly if you feel lightheaded or dizzy  · Tell any doctor or dentist who treats you that you are using this medicine  · This medicine can be habit-forming  Do not use more than your prescribed dose  Call your doctor if you think your medicine is not working  · Do not stop using this medicine suddenly  Your doctor will need to slowly decrease your dose before you stop it completely  · This medicine could cause infertility  Talk with your doctor before using this medicine if you plan to have children  · Keep all medicine out of the reach of children  Never share your medicine with anyone    Possible Side Effects While Using This Medicine:   Call your doctor right away if you notice any of these side effects:  · Allergic reaction: Itching or hives, swelling in your face or hands, swelling or tingling in your mouth or throat, chest tightness, trouble breathing  · Blue lips, fingernails, or skin  · Dark urine or pale stools, nausea, vomiting, loss of appetite, stomach pain, yellow skin or eyes  · Extreme dizziness or weakness, shallow breathing, sweating, seizures, cold or clammy skin  · Severe confusion, lightheadedness, dizziness, or fainting  · Trouble breathing or slow breathing  If you notice these less serious side effects, talk with your doctor:   · Headache, trouble sleeping  · Constipation or upset stomach  · Shaking, feeling hot or cold, runny nose, watery eyes, diarrhea, vomiting, and muscle aches  If you notice other side effects that you think are caused by this medicine, tell your doctor  Call your doctor for medical advice about side effects  You may report side effects to FDA at 3-653-DBF-3800  © 2017 [address] [employer] Information is for End User's use only and may not be sold, redistributed or otherwise used for commercial purposes  The above information is an  only  It is not intended as medical advice for individual conditions or treatments  Talk to your doctor, nurse or pharmacist before following any medical regimen to see if it is safe and effective for you

## 2018-06-07 LAB
BUPRENORPHINE UR CFM-MCNC: 154 NG/ML
BUPRENORPHINE UR QL CFM: NORMAL NG/ML
BUPRENORPHINE UR QL CFM: POSITIVE
BUPRENORPHINE+NOR UR QL: POSITIVE
NORBUPRENORPHINE UR CFM-MCNC: 590 NG/ML
NORBUPRENORPHINE UR QL CFM: POSITIVE

## 2018-06-14 LAB
AMPHETAMINES UR QL SCN: POSITIVE
BARBITURATES UR QL SCN: NEGATIVE NG/ML
BENZODIAZ UR QL SCN: NEGATIVE
BZE UR QL: NEGATIVE NG/ML
CANNABINOIDS UR QL SCN: NEGATIVE NG/ML
METHADONE UR QL SCN: NEGATIVE NG/ML
OPIATES UR QL: NEGATIVE NG/ML
PCP UR QL: NEGATIVE NG/ML
PROPOXYPH UR QL: NEGATIVE NG/ML

## 2018-06-21 ENCOUNTER — TELEPHONE (OUTPATIENT)
Dept: INTERNAL MEDICINE CLINIC | Facility: CLINIC | Age: 53
End: 2018-06-21

## 2018-06-21 DIAGNOSIS — F41.1 GENERALIZED ANXIETY DISORDER: ICD-10-CM

## 2018-06-21 RX ORDER — CLONIDINE HYDROCHLORIDE 0.3 MG/1
0.3 TABLET ORAL 2 TIMES DAILY
Qty: 60 TABLET | Refills: 0 | Status: SHIPPED | OUTPATIENT
Start: 2018-06-21 | End: 2018-10-27

## 2018-06-21 RX ORDER — ALPRAZOLAM 1 MG/1
1 TABLET ORAL 2 TIMES DAILY
Qty: 60 TABLET | Refills: 0 | Status: SHIPPED | OUTPATIENT
Start: 2018-06-21 | End: 2018-07-02

## 2018-06-21 RX ORDER — DULOXETIN HYDROCHLORIDE 60 MG/1
60 CAPSULE, DELAYED RELEASE ORAL DAILY
Qty: 30 CAPSULE | Refills: 0 | Status: SHIPPED | OUTPATIENT
Start: 2018-06-21 | End: 2019-01-15

## 2018-06-21 NOTE — TELEPHONE ENCOUNTER
Pharmacy was called and notified to discontinue the xanax as pt is in the suboxone program  cymbalta and clonidine ok to fill

## 2018-07-02 ENCOUNTER — OFFICE VISIT (OUTPATIENT)
Dept: INTERNAL MEDICINE CLINIC | Facility: CLINIC | Age: 53
End: 2018-07-02
Payer: MEDICARE

## 2018-07-02 VITALS
TEMPERATURE: 97.5 F | BODY MASS INDEX: 38.19 KG/M2 | DIASTOLIC BLOOD PRESSURE: 78 MMHG | HEART RATE: 59 BPM | RESPIRATION RATE: 18 BRPM | SYSTOLIC BLOOD PRESSURE: 132 MMHG | OXYGEN SATURATION: 99 % | HEIGHT: 72 IN | WEIGHT: 282 LBS

## 2018-07-02 DIAGNOSIS — Z79.899 DRUG THERAPY CONTINUED: ICD-10-CM

## 2018-07-02 DIAGNOSIS — T50.905A ADVERSE EFFECT OF DRUG, INITIAL ENCOUNTER: ICD-10-CM

## 2018-07-02 DIAGNOSIS — F41.9 ANXIETY: ICD-10-CM

## 2018-07-02 DIAGNOSIS — F19.20 DRUG DEPENDENCE (HCC): Primary | ICD-10-CM

## 2018-07-02 DIAGNOSIS — Z51.81 ENCOUNTER FOR MONITORING SUBOXONE MAINTENANCE THERAPY: ICD-10-CM

## 2018-07-02 DIAGNOSIS — R82.5 POSITIVE URINE DRUG SCREEN: ICD-10-CM

## 2018-07-02 DIAGNOSIS — Z79.899 ENCOUNTER FOR MONITORING SUBOXONE MAINTENANCE THERAPY: ICD-10-CM

## 2018-07-02 DIAGNOSIS — IMO0002 HISTORY OF PRESCRIPTION DRUG ABUSE: ICD-10-CM

## 2018-07-02 DIAGNOSIS — L27.0 DRUG ERUPTION: ICD-10-CM

## 2018-07-02 PROCEDURE — 99214 OFFICE O/P EST MOD 30 MIN: CPT | Performed by: INTERNAL MEDICINE

## 2018-07-02 PROCEDURE — 80307 DRUG TEST PRSMV CHEM ANLYZR: CPT | Performed by: INTERNAL MEDICINE

## 2018-07-02 RX ORDER — BUPRENORPHINE HYDROCHLORIDE AND NALOXONE HYDROCHLORIDE DIHYDRATE 8; 2 MG/1; MG/1
TABLET SUBLINGUAL
Qty: 30 TABLET | Refills: 0 | Status: SHIPPED | OUTPATIENT
Start: 2018-07-02 | End: 2018-07-02

## 2018-07-02 NOTE — PATIENT INSTRUCTIONS
Buprenorphine/Naloxone (Into the mouth)   Buprenorphine (bue-pre-NOR-feen), Naloxone (nal-OX-one)  Treats narcotic dependence  Brand Name(s): Bunavail, Suboxone, Zubsolv   There may be other brand names for this medicine  When This Medicine Should Not Be Used: This medicine is not right for everyone  Do not use it if you had an allergic reaction to buprenorphine or naloxone  How to Use This Medicine: Thin Sheet, Tablet  · Take your medicine as directed  Your dose may need to be changed several times to find what works best for you  · You must let the medicine dissolve  Never swallow the film or tablet  Your body may not absorb enough of the medicine if you swallow it  · Your health caregiver should show you how to use the medicine  If you do not understand, ask for help  It is important to use the medicine correctly  · Do not talk while the medicine is in your mouth  · Buccal film: Rinse your mouth with water to moisten it  Place the film against the inside of your cheek  If your doctor told you to use more than 1 film, place the second film inside your other cheek  Do not place more than 2 films inside of 1 cheek at a time  Do not move or touch the film  Do not eat or drink anything until the film is completely dissolved  · Sublingual tablet: Place the tablet under your tongue  If your doctor told you to use more than 1 tablet, place all of the tablets in different places under your tongue at the same time  You can use 2 tablets at a time until you have taken all of the medicine, if that is easier for you  Let the tablets dissolve completely in your mouth  Do not eat or drink anything until the tablets are completely dissolved  · Sublingual film: Drink some water to help moisten your mouth  Place the film under your tongue  If your doctor told you to use more than 1 film, place the second film on the opposite side from the first one  Do not move the film after you place it under your tongue   If you are supposed to use more than 2 films, use them the same way, but do not start until the first 2 films are completely dissolved  · Do not break, crush, chew, or cut the film or tablet  · This medicine should come with a Medication Guide  Ask your pharmacist for a copy if you do not have one  · Missed dose: Take a dose as soon as you remember  If it is almost time for your next dose, wait until then and take a regular dose  Do not take extra medicine to make up for a missed dose  · Store the medicine in a closed container at room temperature, away from heat, moisture, and direct light  Ask your pharmacist about the best way to dispose of medicine you do not use  Drugs and Foods to Avoid:   Ask your doctor or pharmacist before using any other medicine, including over-the-counter medicines, vitamins, and herbal products  · Do not use this medicine if you are using or have used an MAO inhibitor within the past 14 days  · Some medicines can affect how buprenorphine/naloxone works  Tell your doctor if you are using the following:   ¨ Carbamazepine, erythromycin, ketoconazole, mirtazapine, phenobarbital, phenytoin, rifampin, tramadol, or trazodone  ¨ Medicine to treat depression  ¨ Medicine to treat HIV/AIDS (including atazanavir, delavirdine, efavirenz, etravirine, nevirapine, ritonavir)  ¨ Phenothiazine medicine  · Do not drink alcohol while you are using this medicine  · Tell your doctor if you use anything else that makes you sleepy  Some examples are allergy medicine, narcotic pain medicine, and alcohol  Tell your doctor if you are also using butorphanol, nalbuphine, pentazocine, or a muscle relaxer    Warnings While Using This Medicine:   · Tell your doctor if you are pregnant or breastfeeding, or if you have kidney disease, liver disease (including hepatitis), lung or breathing problems, adrenal gland problems, an enlarged prostate, trouble urinating, gallbladder problems, low thyroid levels, stomach problems, or a history of depression, brain tumor, head injury, alcohol or drug abuse  · This medicine may cause the following problems:  ¨ High risk of overdose, which can lead to death  ¨ Respiratory depression (serious breathing problem that can be life-threatening)  ¨ Liver problems  ¨ Serotonin syndrome, when used with certain medicines  · This medicine may make you dizzy or drowsy  Do not drive or do anything that could be dangerous until you know how this medicine affects you  Stand or sit up slowly if you feel lightheaded or dizzy  · Tell any doctor or dentist who treats you that you are using this medicine  · This medicine can be habit-forming  Do not use more than your prescribed dose  Call your doctor if you think your medicine is not working  · Do not stop using this medicine suddenly  Your doctor will need to slowly decrease your dose before you stop it completely  · This medicine could cause infertility  Talk with your doctor before using this medicine if you plan to have children  · Keep all medicine out of the reach of children  Never share your medicine with anyone    Possible Side Effects While Using This Medicine:   Call your doctor right away if you notice any of these side effects:  · Allergic reaction: Itching or hives, swelling in your face or hands, swelling or tingling in your mouth or throat, chest tightness, trouble breathing  · Blue lips, fingernails, or skin  · Dark urine or pale stools, nausea, vomiting, loss of appetite, stomach pain, yellow skin or eyes  · Extreme dizziness or weakness, shallow breathing, sweating, seizures, cold or clammy skin  · Severe confusion, lightheadedness, dizziness, or fainting  · Trouble breathing or slow breathing  If you notice these less serious side effects, talk with your doctor:   · Headache, trouble sleeping  · Constipation or upset stomach  · Shaking, feeling hot or cold, runny nose, watery eyes, diarrhea, vomiting, and muscle aches  If you notice other side effects that you think are caused by this medicine, tell your doctor  Call your doctor for medical advice about side effects  You may report side effects to FDA at 8-101-IBF-9836  © 2017 [address] [employer] Information is for End User's use only and may not be sold, redistributed or otherwise used for commercial purposes  The above information is an  only  It is not intended as medical advice for individual conditions or treatments  Talk to your doctor, nurse or pharmacist before following any medical regimen to see if it is safe and effective for you

## 2018-07-02 NOTE — PROGRESS NOTES
Assessment/Plan:    No problem-specific Assessment & Plan notes found for this encounter  Diagnoses and all orders for this visit:    Drug dependence [facility])  -     Toxicology screen, urine  -     Suboxone  -     Discontinue: buprenorphine-naloxone (SUBOXONE) 8-2 mg per SL tablet; Two pills sl q day  Drug therapy continued  -     Discontinue: buprenorphine-naloxone (SUBOXONE) 8-2 mg per SL tablet; Two pills sl q day  Encounter for monitoring Suboxone maintenance therapy  -     Discontinue: buprenorphine-naloxone (SUBOXONE) 8-2 mg per SL tablet; Two pills sl q day  History of prescription drug abuse ([facility] [address] )  -     Discontinue: buprenorphine-naloxone (SUBOXONE) 8-2 mg per SL tablet; Two pills sl q day  Adverse effect of drug, initial encounter    Positive urine drug screen    Anxiety    Drug eruption      A/P: ??reaction to the suboxone or if he had another exposure  Discussed his amphetamine use while he was on the suboxone and the possible consequences  Has been off suboxone for two weeks  Would not rechallenge with suboxone  Discussed using methadone or vivatrol  Will send in request and pt to call for methadone appt if desired  Will refer to our psych PA for his FAIZA  None of the wounds look infected at this time  RTC four weeks for f/u  Subjective:      Patient ID: Carla Anderson is a 46 y o  male  WM presents for suboxone  Pt reports having severe itching of the skin several weeks ago after starting the suboxone  Itching was so bad, that he began "digging" in his skin to alleviate his itching  No prior occurrence  No new meds, dietary changes, or other exposures  Should be known that the pt last UDT was positive for amphetamines and he admits to using at that time  No SOB, CP, or lightheadedness  Reports s/s resolved after stopping the med several weeks ago  No meds for addiction at this time and is doing well otherwise           The following portions of the patient's history were reviewed and updated as appropriate:   He  has a past medical history of Anxiety (5/2/2018); Arthritis; Chest pain; Hypertension; Kidney stone; Obesity; Pleural effusion; Pneumonia; and Sciatica  He   Patient Active Problem List    Diagnosis Date Noted    Encounter for monitoring Suboxone maintenance therapy 05/25/2018    Drug therapy continued 05/25/2018    History of prescription drug abuse ([facility] [address] ) 05/25/2018    Sciatic nerve disease, left 05/24/2018    [facility] 05/24/2018    Anxiety 05/02/2018    Benign prostate hyperplasia 01/15/2018    Gallbladder polyp 09/15/2017    Hepatic steatosis 09/15/2017    Nephrolithiasis 09/15/2017    Obesity 09/15/2017    Tobacco use disorder 09/15/2017    Essential hypertension 10/05/2015    Degeneration of intervertebral disc of lumbosacral region 10/05/2015    Generalized anxiety disorder 10/05/2015     He  has a past surgical history that includes Chest tube insertion; Hernia repair (2011); Lumbar laminectomy (2009); and Tonsillectomy  His family history includes Cancer in his brother and father; Diabetes in his mother; Heart failure in his mother; Liver cancer in his family and father; Lung cancer in his brother; Other in his mother; Testicular cancer in his brother  He  reports that he has been smoking  He has never used smokeless tobacco  He reports that he uses drugs, including Marijuana  He reports that he does not drink alcohol    Current Outpatient Prescriptions   Medication Sig Dispense Refill    cloNIDine (CATAPRES) 0 3 mg tablet Take 1 tablet (0 3 mg total) by mouth 2 (two) times a day 60 tablet 0    DULoxetine (CYMBALTA) 60 mg delayed release capsule Take 1 capsule (60 mg total) by mouth daily 30 capsule 0    furosemide (LASIX) 40 mg tablet Take 1 tablet (40 mg total) by mouth daily 30 tablet 2    metoprolol tartrate (LOPRESSOR) 50 mg tablet Take 1 tablet (50 mg total) by mouth 2 (two) times a day 60 tablet 2    pregabalin (LYRICA) 200 MG capsule Take 1 capsule by mouth 2 (two) times a day      tamsulosin (FLOMAX) 0 4 mg Take 1 capsule by mouth daily       No current facility-administered medications for this visit  Current Outpatient Prescriptions on File Prior to Visit   Medication Sig    cloNIDine (CATAPRES) 0 3 mg tablet Take 1 tablet (0 3 mg total) by mouth 2 (two) times a day    DULoxetine (CYMBALTA) 60 mg delayed release capsule Take 1 capsule (60 mg total) by mouth daily    furosemide (LASIX) 40 mg tablet Take 1 tablet (40 mg total) by mouth daily    metoprolol tartrate (LOPRESSOR) 50 mg tablet Take 1 tablet (50 mg total) by mouth 2 (two) times a day    pregabalin (LYRICA) 200 MG capsule Take 1 capsule by mouth 2 (two) times a day    tamsulosin (FLOMAX) 0 4 mg Take 1 capsule by mouth daily    [DISCONTINUED] ALPRAZolam (XANAX) 1 mg tablet Take 1 tablet (1 mg total) by mouth 2 (two) times a day    [DISCONTINUED] buprenorphine-naloxone (SUBOXONE) 8-2 mg per SL tablet Two pills sl q day  No current facility-administered medications on file prior to visit  He is allergic to codeine and suboxone [buprenorphine hcl-naloxone hcl]       Review of Systems   Constitutional: Negative for activity change, chills, diaphoresis, fatigue and fever  HENT: Negative  Respiratory: Negative for cough, chest tightness, shortness of breath and wheezing  Cardiovascular: Negative for chest pain, palpitations and leg swelling  Gastrointestinal: Negative for abdominal pain, constipation, diarrhea, nausea and vomiting  Genitourinary: Negative for difficulty urinating, dysuria and frequency  Musculoskeletal: Negative for arthralgias, gait problem and myalgias  Skin: Positive for rash  Neurological: Negative for dizziness, seizures, weakness, light-headedness and headaches  Psychiatric/Behavioral: Positive for dysphoric mood  Negative for confusion, self-injury, sleep disturbance and suicidal ideas  The patient is nervous/anxious  Objective:      /78 (BP Location: Left arm, Patient Position: Sitting, Cuff Size: Large)   Pulse 59   Temp 97 5 °F (36 4 °C) (Tympanic)   Resp 18   Ht 6' (1 829 m)   Wt 128 kg (282 lb)   SpO2 99%   BMI 38 25 kg/m²          Physical Exam   Constitutional: He is oriented to person, place, and time  He appears well-developed and well-nourished  HENT:   Head: Normocephalic and atraumatic  Mouth/Throat: Oropharynx is clear and moist    Eyes: Conjunctivae and EOM are normal  Pupils are equal, round, and reactive to light  Neck: Neck supple  No JVD present  Cardiovascular: Normal rate, regular rhythm and normal heart sounds  No murmur heard  Pulmonary/Chest: Effort normal and breath sounds normal  No respiratory distress  He has no wheezes  Abdominal: Soft  Bowel sounds are normal  He exhibits no distension  There is no tenderness  Musculoskeletal: He exhibits no edema  Neurological: He is alert and oriented to person, place, and time  Skin:   Multiple healing scabbed round lesions on the legs and arms and some on the trunk  No d/c or erythema  Psychiatric: He has a normal mood and affect  His behavior is normal  Judgment and thought content normal    Nursing note and vitals reviewed

## 2018-07-07 LAB
AMPHETAMINES UR QL SCN: NEGATIVE NG/ML
BARBITURATES UR QL SCN: NEGATIVE NG/ML
BENZODIAZ UR QL SCN: NEGATIVE NG/ML
BUPRENORPHINE UR CFM-MCNC: 10 NG/ML
BUPRENORPHINE UR QL CFM: NORMAL NG/ML
BUPRENORPHINE UR QL CFM: POSITIVE
BUPRENORPHINE+NOR UR QL: POSITIVE
BZE UR QL: NEGATIVE NG/ML
CANNABINOIDS UR QL SCN: POSITIVE
METHADONE UR QL SCN: NEGATIVE NG/ML
NORBUPRENORPHINE UR CFM-MCNC: 18 NG/ML
NORBUPRENORPHINE UR QL CFM: POSITIVE
OPIATES UR QL: NEGATIVE NG/ML
PCP UR QL: NEGATIVE NG/ML
PROPOXYPH UR QL: NEGATIVE NG/ML

## 2018-10-27 ENCOUNTER — HOSPITAL ENCOUNTER (EMERGENCY)
Facility: HOSPITAL | Age: 53
Discharge: HOME | End: 2018-10-27
Attending: EMERGENCY MEDICINE | Admitting: EMERGENCY MEDICINE
Payer: MEDICARE

## 2018-10-27 VITALS
DIASTOLIC BLOOD PRESSURE: 86 MMHG | HEIGHT: 72 IN | HEART RATE: 98 BPM | TEMPERATURE: 98.3 F | SYSTOLIC BLOOD PRESSURE: 137 MMHG | BODY MASS INDEX: 37.25 KG/M2 | OXYGEN SATURATION: 96 % | WEIGHT: 275 LBS | RESPIRATION RATE: 16 BRPM

## 2018-10-27 DIAGNOSIS — F41.1 GENERALIZED ANXIETY DISORDER: ICD-10-CM

## 2018-10-27 DIAGNOSIS — I10 BENIGN ESSENTIAL HYPERTENSION: ICD-10-CM

## 2018-10-27 DIAGNOSIS — F41.9 ANXIETY: Primary | ICD-10-CM

## 2018-10-27 DIAGNOSIS — I10 UNCONTROLLED HYPERTENSION: ICD-10-CM

## 2018-10-27 PROCEDURE — 99283 EMERGENCY DEPT VISIT LOW MDM: CPT

## 2018-10-27 RX ORDER — CLONIDINE HYDROCHLORIDE 0.1 MG/1
0.3 TABLET ORAL ONCE
Status: COMPLETED | OUTPATIENT
Start: 2018-10-27 | End: 2018-10-27

## 2018-10-27 RX ORDER — CLONIDINE HYDROCHLORIDE 0.3 MG/1
0.3 TABLET ORAL 2 TIMES DAILY
Qty: 28 TABLET | Refills: 0 | Status: SHIPPED | OUTPATIENT
Start: 2018-10-27 | End: 2019-01-21

## 2018-10-27 RX ORDER — ALPRAZOLAM 0.5 MG/1
2 TABLET ORAL ONCE
Status: COMPLETED | OUTPATIENT
Start: 2018-10-27 | End: 2018-10-27

## 2018-10-27 RX ORDER — METOPROLOL TARTRATE 50 MG/1
50 TABLET, FILM COATED ORAL 2 TIMES DAILY
Qty: 28 TABLET | Refills: 0 | Status: SHIPPED | OUTPATIENT
Start: 2018-10-27 | End: 2019-01-21

## 2018-10-27 RX ADMIN — CLONIDINE HYDROCHLORIDE 0.3 MG: 0.1 TABLET ORAL at 10:58

## 2018-10-27 RX ADMIN — ALPRAZOLAM 2 MG: 0.5 TABLET ORAL at 10:58

## 2018-10-27 RX ADMIN — METOPROLOL TARTRATE 50 MG: 25 TABLET ORAL at 10:58

## 2018-10-27 NOTE — ED PROVIDER NOTES
History  Chief Complaint   Patient presents with    High Blood Pressure    Anxiety     friend passed away this morning     50-year-old male with history of hypertension, anxiety, opiate abuse and BPH presents for evaluation of anxiety and hypertension  Patient reports increase in anxiety symptoms secondary to multiple deaths over the last week  Patient also reports that he has not taken his blood pressure medications in 3 months as he has lost weight and has feeling better  Patient denies visual disturbance, headache, dizziness, chest pain, shortness breath or abdominal pain  Patient also reports no suicidal or homicidal ideations and no audio or visual hallucinations   used: No        Prior to Admission Medications   Prescriptions Last Dose Informant Patient Reported? Taking?    DULoxetine (CYMBALTA) 60 mg delayed release capsule Not Taking at Unknown time  No No   Sig: Take 1 capsule (60 mg total) by mouth daily   Patient not taking: Reported on 10/27/2018    cloNIDine (CATAPRES) 0 3 mg tablet Not Taking at Unknown time  No No   Sig: Take 1 tablet (0 3 mg total) by mouth 2 (two) times a day   Patient not taking: Reported on 10/27/2018    furosemide (LASIX) 40 mg tablet Not Taking at Unknown time  No No   Sig: Take 1 tablet (40 mg total) by mouth daily   Patient not taking: Reported on 10/27/2018    metoprolol tartrate (LOPRESSOR) 50 mg tablet Not Taking at Unknown time  No No   Sig: Take 1 tablet (50 mg total) by mouth 2 (two) times a day   Patient not taking: Reported on 10/27/2018    pregabalin (LYRICA) 200 MG capsule Not Taking at Unknown time  Yes No   Sig: Take 1 capsule by mouth 2 (two) times a day   tamsulosin (FLOMAX) 0 4 mg 10/27/2018 at Unknown time  Yes Yes   Sig: Take 1 capsule by mouth daily      Facility-Administered Medications: None       Past Medical History:   Diagnosis Date    Anxiety 5/2/2018    Arthritis     Chest pain     Last Assessed: 10/5/2016   Gabriella Huber Hypertension     Kidney stone     Obesity     Pleural effusion     Last Assessed: 9/15/2017    Pneumonia     Sciatica        Past Surgical History:   Procedure Laterality Date    CHEST TUBE INSERTION      Last Assessed: 9/15/2017    HERNIA REPAIR  2011    LUMBAR LAMINECTOMY  2009    TONSILLECTOMY      Last Assessed: 9/15/2017       Family History   Problem Relation Age of Onset    Other Mother         Cardiac Disorder    Heart failure Mother     Diabetes Mother     Cancer Father     Liver cancer Father     Cancer Brother     Testicular cancer Brother     Lung cancer Brother     Liver cancer Family      I have reviewed and agree with the history as documented  Social History   Substance Use Topics    Smoking status: Current Every Day Smoker     Packs/day: 1 00     Types: Cigarettes    Smokeless tobacco: Never Used    Alcohol use No      Comment: Former consumption of alcohol        Review of Systems   Constitutional: Negative for chills and fever  HENT: Negative for rhinorrhea and sore throat  Eyes: Negative for visual disturbance  Respiratory: Negative for cough and shortness of breath  Cardiovascular: Negative for chest pain and leg swelling  Gastrointestinal: Negative for abdominal pain, diarrhea, nausea and vomiting  Genitourinary: Negative for dysuria  Musculoskeletal: Negative for back pain and myalgias  Skin: Negative for rash  Neurological: Negative for dizziness, weakness, light-headedness, numbness and headaches  Psychiatric/Behavioral: Negative for confusion, hallucinations, self-injury and suicidal ideas  The patient is nervous/anxious  All other systems reviewed and are negative  Physical Exam  Physical Exam   Constitutional: He is oriented to person, place, and time  He appears well-developed and well-nourished  HENT:   Nose: Nose normal    Mouth/Throat: Oropharynx is clear and moist  No oropharyngeal exudate     Eyes: Pupils are equal, round, and reactive to light  Conjunctivae and EOM are normal  No scleral icterus  Neck: Normal range of motion  Neck supple  No JVD present  No tracheal deviation present  Cardiovascular: Normal rate, regular rhythm and normal heart sounds  No murmur heard  Pulmonary/Chest: Effort normal and breath sounds normal  No respiratory distress  He has no wheezes  He has no rales  Abdominal: Soft  Bowel sounds are normal  There is no tenderness  There is no guarding  Musculoskeletal: Normal range of motion  He exhibits no edema or tenderness  Neurological: He is alert and oriented to person, place, and time  No cranial nerve deficit or sensory deficit  He exhibits normal muscle tone  5/5 motor, nl sens   Skin: Skin is warm and dry  Psychiatric: His behavior is normal  His mood appears anxious  His speech is rapid and/or pressured  He expresses no homicidal and no suicidal ideation  He expresses no suicidal plans and no homicidal plans  Nursing note and vitals reviewed        Vital Signs  ED Triage Vitals [10/27/18 1026]   Temperature Pulse Respirations Blood Pressure SpO2   98 2 °F (36 8 °C) 100 20 (!) 218/126 93 %      Temp Source Heart Rate Source Patient Position - Orthostatic VS BP Location FiO2 (%)   Temporal Monitor Sitting Left arm --      Pain Score       No Pain           Vitals:    10/27/18 1100 10/27/18 1115 10/27/18 1130 10/27/18 1210   BP: (!) 220/111 (!) 203/86 137/86 137/86   Pulse: 98      Patient Position - Orthostatic VS:    Lying       Visual Acuity      ED Medications  Medications   metoprolol tartrate (LOPRESSOR) tablet 50 mg (50 mg Oral Given 10/27/18 1058)   cloNIDine (CATAPRES) tablet 0 3 mg (0 3 mg Oral Given 10/27/18 1058)   ALPRAZolam (XANAX) tablet 2 mg (2 mg Oral Given 10/27/18 1058)       Diagnostic Studies  Results Reviewed     None                 No orders to display              Procedures  Procedures       Phone Contacts  ED Phone Contact    ED Course  ED Course as of Oct 27 1225 Sat Oct 27, 2018   1040 Patient seen examined at bedside  Patient reports that he has not taken his blood pressure medications in 3 months since he had lost weight  Additionally patient with anxiety secondary to multiple deaths in the recent week  Discussed with patient plan to resume blood pressure control in ED  Additionally managed expectations of patient in regards to anxiety medication prescription as an outpatient, patient will not receive prescription for benzodiazepine today  Patient will need to follow up with PMD or psych as an outpatient for the prescription  Patient will receive 1 dose of Xanax while in the ED for anxiety  1152 Patient re-evaluated multiple times  Patient is resting in no acute distress, blood pressure appropriately managed  Discussed with patient plan for discharge  2 week supply of antihypertensive prescription sent to patient's pharmacy  Patient will need to follow up with PMD for further management of hypertension medications and medications for anxiety  Patient to return to ED if any change or worsening condition                                  MDM  CritCare Time    Disposition  Final diagnoses:   Anxiety   Uncontrolled hypertension     Time reflects when diagnosis was documented in both MDM as applicable and the Disposition within this note     Time User Action Codes Description Comment    10/27/2018 12:00 PM Lai Troy Add [F41 9] Anxiety     10/27/2018 12:00 PM Jada Ding Add [I10] Uncontrolled hypertension     10/27/2018 12:01 PM Jada Ding Add [F41 1] Generalized anxiety disorder     10/27/2018 12:01 PM Lai Troy Modify [F41 1] Generalized anxiety disorder     10/27/2018 12:01 PM Lai Troy Modify [F41 1] Generalized anxiety disorder     10/27/2018 12:01 PM Jada Ding Add [I10] Benign essential hypertension     10/27/2018 12:01 PM Jada Ding Modify [F41 1] Generalized anxiety disorder       ED Disposition     ED Disposition Condition Comment    Discharge  Hilaria Farhad discharge to home/self care  Condition at discharge: Stable        Follow-up Information     Follow up With Specialties Details [address], DO Internal Medicine In 2 days  [address] [facility]  Emergency Department Emergency Medicine  As needed, If symptoms worsen [address] [facility] [address] [phone]          Discharge Medication List as of 10/27/2018 12:02 PM      CONTINUE these medications which have CHANGED    Details   cloNIDine (CATAPRES) 0 3 mg tablet Take 1 tablet (0 3 mg total) by mouth 2 (two) times a day for 14 days, Starting Sat 10/27/2018, Until Sat 11/10/2018, Normal      metoprolol tartrate (LOPRESSOR) 50 mg tablet Take 1 tablet (50 mg total) by mouth 2 (two) times a day for 14 days, Starting Sat 10/27/2018, Until Sat 11/10/2018, Normal         CONTINUE these medications which have NOT CHANGED    Details   tamsulosin (FLOMAX) 0 4 mg Take 1 capsule by mouth daily, Starting Mon 1/15/2018, Historical Med      DULoxetine (CYMBALTA) 60 mg delayed release capsule Take 1 capsule (60 mg total) by mouth daily, Starting Thu 6/21/2018, Normal      furosemide (LASIX) 40 mg tablet Take 1 tablet (40 mg total) by mouth daily, Starting Fri 5/4/2018, Normal      pregabalin (LYRICA) 200 MG capsule Take 1 capsule by mouth 2 (two) times a day, Historical Med           No discharge procedures on file      ED Provider  Electronically Signed by           Jeremy Pruitt DO  10/27/18 0475

## 2018-10-27 NOTE — ED NOTES
Pt states his good friend passed away this AM and his anxiety is through the roof  States he would like some medication to help him deal with the anxiety for few days until he can see his doctor  Pt states he was taken off of his xanax due to being on pain killers at the same time  "They wouldn't let me have both so I got off the pain medications and they didn't give me the anxiety medications back either  Pt states he has not been taking his regular medications for the past 3 months because he lost weight and felt "great " This AM was awoken to a phone call that his friend passed away  He notes elevated blood pressure this AM also with the anxiety       Tatum Del Toro RN  10/27/18 8288

## 2018-10-27 NOTE — DISCHARGE INSTRUCTIONS
Anxiety   WHAT YOU SHOULD KNOW:   Anxiety is a condition that causes you to feel excessive worry, uneasiness, or fear  Family or work stress, smoking, caffeine, and alcohol can increase your risk for anxiety  Certain medicines or health conditions can also increase your risk  Anxiety may begin gradually, and can become a long-term condition if it is not managed or treated  AFTER YOU LEAVE:   Medicines:   · Medicines  can help you feel more calm and relaxed, and decrease your symptoms  · Take your medicine as directed  Contact your healthcare provider if you think your medicine is not helping or if you have side effects  Tell him if you are allergic to any medicine  Keep a list of the medicines, vitamins, and herbs you take  Include the amounts, and when and why you take them  Bring the list or the pill bottles to follow-up visits  Carry your medicine list with you in case of an emergency  Follow up with your healthcare provider within 2 weeks or as directed:  Write down your questions so you remember to ask them during your visits  Manage anxiety:   · Go to counseling as directed  Cognitive behavioral therapy can help you understand and change how you react to events that trigger your symptoms  · Find ways to manage your symptoms  Activities such as exercise, meditation, or listening to music can help you relax  · Practice deep breathing  Breathing can change how your body reacts to stress  Focus on taking slow, deep breaths several times a day, or during an anxiety attack  Breathe in through your nose, and out through your mouth  · Avoid caffeine  Caffeine can make your symptoms worse  Avoid foods or drinks that are meant to increase your energy level  · Limit or avoid alcohol  Ask your healthcare provider if alcohol is safe for you  You may not be able to drink alcohol if you take certain anxiety or depression medicines  Limit alcohol to 1 drink per day if you are a woman   Limit alcohol to 2 drinks per day if you are a man  A drink of alcohol is 12 ounces of beer, 5 ounces of wine, or 1½ ounces of liquor  Contact your healthcare provider if:   · Your symptoms get worse or do not get better with treatment  · You think your medicine may be causing side effects  · Your anxiety keeps you from doing your regular daily activities  · You have new symptoms since your last visit  · You have questions or concerns about your condition or care  Seek care immediately or call 911 if:   · You have chest pain, tightness, or heaviness that may spread to your shoulders, arms, jaw, neck, or back  · You feel like hurting yourself or someone else  · You feel dizzy, lightheaded, or faint  © 2014 [address] is for End User's use only and may not be sold, redistributed or otherwise used for commercial purposes  All illustrations and images included in CareNotes® are the copyrighted property of [employer]  or [employer]  The above information is an  only  It is not intended as medical advice for individual conditions or treatments  Talk to your doctor, nurse or pharmacist before following any medical regimen to see if it is safe and effective for you  Chronic Hypertension, Ambulatory Care   GENERAL INFORMATION:   Chronic hypertension  is a long-term condition in which your blood pressure (BP) is higher than normal  Your BP is the force of your blood moving against the walls of your arteries  Hypertension is a BP of 140/90 or higher     Common symptoms include the following:   · Headache     · Blurred vision    · Chest pain     · Dizziness or weakness     · Trouble breathing     · Nosebleeds  Seek immediate care for the following symptoms:   · Severe headache or vision loss    · Weakness in an arm or leg    · Confusion or difficulty speaking    · Discomfort in your chest that feels like squeezing, pressure, fullness, or pain    · Suddenly feeling lightheaded or trouble breathing    · Pain or discomfort in your back, neck, jaw, stomach, or arm  Treatment for chronic hypertension  may include medicine to lower your BP  You may also need to make lifestyle changes  Take your medicine exactly as directed  Manage chronic hypertension:   · Take your BP at home  Sit and rest for 5 minutes before you take your BP  Extend your arm and support it on a flat surface  Your arm should be at the same level as your heart  Follow the directions that came with your BP monitor  If possible, take at least 2 BP readings each time  Take your BP at least twice a day at the same times each day, such as morning and evening  Keep a log of your BP readings and bring it to your follow-up visits  · Eat less sodium (salt)  Do not add sodium to your food  Limit foods that are high in sodium, such as canned foods, potato chips, and cold cuts  Your healthcare provider may suggest that you follow the [address] [place] [address] Street  The plan is low in sodium, unhealthy fats, and total fat  It is high in potassium, calcium, and fiber  · Exercise regularly  Exercise at least 30 minutes per day, on most days of the week  This will help decrease your BP  Ask your healthcare provider about the best exercise plan for you  · Limit alcohol  Women should limit alcohol to 1 drink a day  Men should limit alcohol to 2 drinks a day  A drink of alcohol is 12 ounces of beer, 5 ounces of wine, or 1½ ounces of liquor  · Do not smoke  If you smoke, it is never too late to quit  Smoking can increase your BP  Smoking also worsens other health conditions you may have that can increase your risk for hypertension  Ask your healthcare provider for information if you need help quitting  Follow up with your healthcare provider as directed: You will need to return to have your BP checked and to have other lab tests done   Write down your questions so you remember to ask them during your visits  CARE AGREEMENT:   You have the right to help plan your care  Learn about your health condition and how it may be treated  Discuss treatment options with your caregivers to decide what care you want to receive  You always have the right to refuse treatment  The above information is an  only  It is not intended as medical advice for individual conditions or treatments  Talk to your doctor, nurse or pharmacist before following any medical regimen to see if it is safe and effective for you  © 2014 [address] is for End User's use only and may not be sold, redistributed or otherwise used for commercial purposes  All illustrations and images included in CareNotes® are the copyrighted property of [employer]  or [employer]

## 2018-11-13 ENCOUNTER — HOSPITAL ENCOUNTER (INPATIENT)
Facility: HOSPITAL | Age: 53
LOS: 4 days | Discharge: HOME | DRG: 661 | End: 2018-11-17
Attending: EMERGENCY MEDICINE | Admitting: SURGERY
Payer: MEDICARE

## 2018-11-13 ENCOUNTER — APPOINTMENT (EMERGENCY)
Dept: ULTRASOUND IMAGING | Facility: HOSPITAL | Age: 53
DRG: 661 | End: 2018-11-13
Payer: MEDICARE

## 2018-11-13 ENCOUNTER — APPOINTMENT (EMERGENCY)
Dept: CT IMAGING | Facility: HOSPITAL | Age: 53
DRG: 661 | End: 2018-11-13
Payer: MEDICARE

## 2018-11-13 DIAGNOSIS — R10.11 ABDOMINAL PAIN, ACUTE, RIGHT UPPER QUADRANT: ICD-10-CM

## 2018-11-13 DIAGNOSIS — IMO0002 HISTORY OF PRESCRIPTION DRUG ABUSE: ICD-10-CM

## 2018-11-13 DIAGNOSIS — A41.9 SEPSIS (HCC): ICD-10-CM

## 2018-11-13 DIAGNOSIS — I10 ESSENTIAL HYPERTENSION: ICD-10-CM

## 2018-11-13 DIAGNOSIS — N20.1 LEFT URETERAL STONE: Primary | Chronic | ICD-10-CM

## 2018-11-13 DIAGNOSIS — R07.9 CHEST PAIN, UNSPECIFIED TYPE: ICD-10-CM

## 2018-11-13 DIAGNOSIS — I10 UNCONTROLLED HYPERTENSION: ICD-10-CM

## 2018-11-13 DIAGNOSIS — N20.0 RENAL CALCULUS, LEFT: ICD-10-CM

## 2018-11-13 PROBLEM — D72.829 LEUKOCYTOSIS: Status: ACTIVE | Noted: 2018-11-13

## 2018-11-13 PROBLEM — T78.40XA ALLERGIC REACTION CAUSED BY A DRUG: Status: ACTIVE | Noted: 2018-11-13

## 2018-11-13 LAB
ABO GROUP BLD: NORMAL
ALBUMIN SERPL BCP-MCNC: 4.9 G/DL (ref 3.5–5.7)
ALP SERPL-CCNC: 94 U/L (ref 40–150)
ALT SERPL W P-5'-P-CCNC: 26 U/L (ref 7–52)
ANION GAP SERPL CALCULATED.3IONS-SCNC: 11 MMOL/L (ref 4–13)
APTT PPP: 44 SECONDS (ref 26–38)
AST SERPL W P-5'-P-CCNC: 24 U/L (ref 13–39)
BACTERIA UR QL AUTO: ABNORMAL /HPF
BASOPHILS # BLD AUTO: 0.1 THOUSANDS/ΜL (ref 0–0.1)
BASOPHILS NFR BLD AUTO: 1 % (ref 0–2)
BILIRUB SERPL-MCNC: 0.9 MG/DL (ref 0.2–1)
BILIRUB UR QL STRIP: ABNORMAL
BLD GP AB SCN SERPL QL: NEGATIVE
BUN SERPL-MCNC: 13 MG/DL (ref 7–25)
CALCIUM SERPL-MCNC: 10.1 MG/DL (ref 8.6–10.5)
CAOX CRY URNS QL MICRO: ABNORMAL /HPF
CHLORIDE SERPL-SCNC: 98 MMOL/L (ref 98–107)
CLARITY UR: ABNORMAL
CO2 SERPL-SCNC: 24 MMOL/L (ref 21–31)
COLOR UR: ABNORMAL
CREAT SERPL-MCNC: 1.23 MG/DL (ref 0.7–1.3)
EOSINOPHIL # BLD AUTO: 0.3 THOUSAND/ΜL (ref 0–0.61)
EOSINOPHIL NFR BLD AUTO: 2 % (ref 0–5)
ERYTHROCYTE [DISTWIDTH] IN BLOOD BY AUTOMATED COUNT: 13.5 % (ref 11.5–14.5)
GFR SERPL CREATININE-BSD FRML MDRD: 66 ML/MIN/1.73SQ M
GLUCOSE SERPL-MCNC: 167 MG/DL (ref 65–99)
GLUCOSE UR STRIP-MCNC: NEGATIVE MG/DL
HCT VFR BLD AUTO: 50.6 % (ref 36.5–49.3)
HGB BLD-MCNC: 16.7 G/DL (ref 14–18)
HGB UR QL STRIP.AUTO: ABNORMAL
INR PPP: 1.09 (ref 0.9–1.5)
KETONES UR STRIP-MCNC: NEGATIVE MG/DL
LACTATE SERPL-SCNC: 1.5 MMOL/L (ref 0.5–2)
LACTATE SERPL-SCNC: 3.2 MMOL/L (ref 0.5–2)
LEUKOCYTE ESTERASE UR QL STRIP: NEGATIVE
LIPASE SERPL-CCNC: 25 U/L (ref 11–82)
LYMPHOCYTES # BLD AUTO: 1.8 THOUSANDS/ΜL (ref 0.6–4.47)
LYMPHOCYTES NFR BLD AUTO: 14 % (ref 21–51)
MCH RBC QN AUTO: 30.2 PG (ref 26–34)
MCHC RBC AUTO-ENTMCNC: 32.9 G/DL (ref 31–37)
MCV RBC AUTO: 92 FL (ref 81–99)
MONOCYTES # BLD AUTO: 1 THOUSAND/ΜL (ref 0.17–1.22)
MONOCYTES NFR BLD AUTO: 7 % (ref 2–12)
MUCOUS THREADS UR QL AUTO: ABNORMAL
NEUTROPHILS # BLD AUTO: 10.1 THOUSANDS/ΜL (ref 1.4–6.5)
NEUTS SEG NFR BLD AUTO: 76 % (ref 42–75)
NITRITE UR QL STRIP: POSITIVE
NON-SQ EPI CELLS URNS QL MICRO: ABNORMAL /HPF
NRBC BLD AUTO-RTO: 0 /100 WBCS
PH UR STRIP.AUTO: 5.5 [PH] (ref 5–8)
PLATELET # BLD AUTO: 289 THOUSANDS/UL (ref 149–390)
PMV BLD AUTO: 9.3 FL (ref 8.6–11.7)
POTASSIUM SERPL-SCNC: 3.9 MMOL/L (ref 3.5–5.5)
PROT SERPL-MCNC: 8.6 G/DL (ref 6.4–8.9)
PROT UR STRIP-MCNC: ABNORMAL MG/DL
PROTHROMBIN TIME: 12.7 SECONDS (ref 10.2–13)
RBC # BLD AUTO: 5.51 MILLION/UL (ref 4.3–5.9)
RBC #/AREA URNS AUTO: ABNORMAL /HPF
RH BLD: POSITIVE
SODIUM SERPL-SCNC: 133 MMOL/L (ref 134–143)
SP GR UR STRIP.AUTO: >=1.03 (ref 1–1.03)
SPECIMEN EXPIRATION DATE: NORMAL
UROBILINOGEN UR QL STRIP.AUTO: 0.2 E.U./DL
WBC # BLD AUTO: 13.3 THOUSAND/UL (ref 4.8–10.8)
WBC #/AREA URNS AUTO: ABNORMAL /HPF

## 2018-11-13 PROCEDURE — 99285 EMERGENCY DEPT VISIT HI MDM: CPT

## 2018-11-13 PROCEDURE — 96375 TX/PRO/DX INJ NEW DRUG ADDON: CPT

## 2018-11-13 PROCEDURE — 96361 HYDRATE IV INFUSION ADD-ON: CPT

## 2018-11-13 PROCEDURE — 83605 ASSAY OF LACTIC ACID: CPT | Performed by: PHYSICIAN ASSISTANT

## 2018-11-13 PROCEDURE — 85730 THROMBOPLASTIN TIME PARTIAL: CPT | Performed by: PHYSICIAN ASSISTANT

## 2018-11-13 PROCEDURE — 86850 RBC ANTIBODY SCREEN: CPT | Performed by: PHYSICIAN ASSISTANT

## 2018-11-13 PROCEDURE — 85025 COMPLETE CBC W/AUTO DIFF WBC: CPT | Performed by: PHYSICIAN ASSISTANT

## 2018-11-13 PROCEDURE — 87040 BLOOD CULTURE FOR BACTERIA: CPT | Performed by: PHYSICIAN ASSISTANT

## 2018-11-13 PROCEDURE — 80053 COMPREHEN METABOLIC PANEL: CPT | Performed by: PHYSICIAN ASSISTANT

## 2018-11-13 PROCEDURE — 86901 BLOOD TYPING SEROLOGIC RH(D): CPT | Performed by: PHYSICIAN ASSISTANT

## 2018-11-13 PROCEDURE — 99232 SBSQ HOSP IP/OBS MODERATE 35: CPT | Performed by: PHYSICIAN ASSISTANT

## 2018-11-13 PROCEDURE — 86900 BLOOD TYPING SEROLOGIC ABO: CPT | Performed by: PHYSICIAN ASSISTANT

## 2018-11-13 PROCEDURE — 83690 ASSAY OF LIPASE: CPT | Performed by: PHYSICIAN ASSISTANT

## 2018-11-13 PROCEDURE — 94760 N-INVAS EAR/PLS OXIMETRY 1: CPT

## 2018-11-13 PROCEDURE — 36415 COLL VENOUS BLD VENIPUNCTURE: CPT | Performed by: PHYSICIAN ASSISTANT

## 2018-11-13 PROCEDURE — 74176 CT ABD & PELVIS W/O CONTRAST: CPT

## 2018-11-13 PROCEDURE — 76705 ECHO EXAM OF ABDOMEN: CPT

## 2018-11-13 PROCEDURE — 85610 PROTHROMBIN TIME: CPT | Performed by: PHYSICIAN ASSISTANT

## 2018-11-13 PROCEDURE — 96365 THER/PROPH/DIAG IV INF INIT: CPT

## 2018-11-13 PROCEDURE — 81001 URINALYSIS AUTO W/SCOPE: CPT | Performed by: PHYSICIAN ASSISTANT

## 2018-11-13 PROCEDURE — 96372 THER/PROPH/DIAG INJ SC/IM: CPT

## 2018-11-13 RX ORDER — ONDANSETRON 2 MG/ML
4 INJECTION INTRAMUSCULAR; INTRAVENOUS ONCE
Status: COMPLETED | OUTPATIENT
Start: 2018-11-13 | End: 2018-11-13

## 2018-11-13 RX ORDER — DIPHENHYDRAMINE HYDROCHLORIDE 50 MG/ML
50 INJECTION INTRAMUSCULAR; INTRAVENOUS EVERY 6 HOURS PRN
Status: DISCONTINUED | OUTPATIENT
Start: 2018-11-13 | End: 2018-11-17 | Stop reason: HOSPADM

## 2018-11-13 RX ORDER — KETOROLAC TROMETHAMINE 30 MG/ML
60 INJECTION, SOLUTION INTRAMUSCULAR; INTRAVENOUS ONCE
Status: COMPLETED | OUTPATIENT
Start: 2018-11-13 | End: 2018-11-13

## 2018-11-13 RX ORDER — SODIUM CHLORIDE, SODIUM LACTATE, POTASSIUM CHLORIDE, CALCIUM CHLORIDE 600; 310; 30; 20 MG/100ML; MG/100ML; MG/100ML; MG/100ML
150 INJECTION, SOLUTION INTRAVENOUS CONTINUOUS
Status: DISCONTINUED | OUTPATIENT
Start: 2018-11-13 | End: 2018-11-15

## 2018-11-13 RX ORDER — ONDANSETRON 2 MG/ML
4 INJECTION INTRAMUSCULAR; INTRAVENOUS EVERY 4 HOURS PRN
Status: DISCONTINUED | OUTPATIENT
Start: 2018-11-13 | End: 2018-11-15 | Stop reason: HOSPADM

## 2018-11-13 RX ORDER — DULOXETIN HYDROCHLORIDE 60 MG/1
60 CAPSULE, DELAYED RELEASE ORAL DAILY
Status: DISCONTINUED | OUTPATIENT
Start: 2018-11-14 | End: 2018-11-17 | Stop reason: HOSPADM

## 2018-11-13 RX ORDER — NICOTINE 21 MG/24HR
21 PATCH, TRANSDERMAL 24 HOURS TRANSDERMAL DAILY
Status: DISCONTINUED | OUTPATIENT
Start: 2018-11-13 | End: 2018-11-17 | Stop reason: HOSPADM

## 2018-11-13 RX ORDER — HEPARIN SODIUM 5000 [USP'U]/ML
5000 INJECTION, SOLUTION INTRAVENOUS; SUBCUTANEOUS EVERY 8 HOURS SCHEDULED
Status: DISCONTINUED | OUTPATIENT
Start: 2018-11-13 | End: 2018-11-15 | Stop reason: HOSPADM

## 2018-11-13 RX ORDER — LISINOPRIL 40 MG/1
40 TABLET ORAL DAILY
COMMUNITY
End: 2019-01-15

## 2018-11-13 RX ORDER — KETOROLAC TROMETHAMINE 30 MG/ML
15 INJECTION, SOLUTION INTRAMUSCULAR; INTRAVENOUS EVERY 6 HOURS SCHEDULED
Status: DISCONTINUED | OUTPATIENT
Start: 2018-11-13 | End: 2018-11-13

## 2018-11-13 RX ORDER — METOPROLOL TARTRATE 50 MG/1
50 TABLET, FILM COATED ORAL 2 TIMES DAILY
Status: DISCONTINUED | OUTPATIENT
Start: 2018-11-13 | End: 2018-11-17 | Stop reason: HOSPADM

## 2018-11-13 RX ORDER — LORAZEPAM 2 MG/ML
1 INJECTION INTRAMUSCULAR EVERY 6 HOURS PRN
Status: DISCONTINUED | OUTPATIENT
Start: 2018-11-13 | End: 2018-11-17 | Stop reason: HOSPADM

## 2018-11-13 RX ORDER — TAMSULOSIN HYDROCHLORIDE 0.4 MG/1
0.4 CAPSULE ORAL DAILY
Status: DISCONTINUED | OUTPATIENT
Start: 2018-11-14 | End: 2018-11-15

## 2018-11-13 RX ORDER — LABETALOL HYDROCHLORIDE 5 MG/ML
10 INJECTION, SOLUTION INTRAVENOUS ONCE
Status: COMPLETED | OUTPATIENT
Start: 2018-11-13 | End: 2018-11-13

## 2018-11-13 RX ORDER — PREGABALIN 100 MG/1
200 CAPSULE ORAL 2 TIMES DAILY
Status: DISCONTINUED | OUTPATIENT
Start: 2018-11-13 | End: 2018-11-17 | Stop reason: HOSPADM

## 2018-11-13 RX ORDER — CLONIDINE HYDROCHLORIDE 0.1 MG/1
0.3 TABLET ORAL 2 TIMES DAILY
Status: DISCONTINUED | OUTPATIENT
Start: 2018-11-13 | End: 2018-11-17 | Stop reason: HOSPADM

## 2018-11-13 RX ORDER — HYDRALAZINE HYDROCHLORIDE 20 MG/ML
5 INJECTION INTRAMUSCULAR; INTRAVENOUS ONCE
Status: COMPLETED | OUTPATIENT
Start: 2018-11-13 | End: 2018-11-13

## 2018-11-13 RX ORDER — HYDROMORPHONE HCL/PF 1 MG/ML
1 SYRINGE (ML) INJECTION ONCE
Status: COMPLETED | OUTPATIENT
Start: 2018-11-13 | End: 2018-11-13

## 2018-11-13 RX ADMIN — KETOROLAC TROMETHAMINE 15 MG: 30 INJECTION, SOLUTION INTRAMUSCULAR at 19:44

## 2018-11-13 RX ADMIN — PIPERACILLIN SODIUM AND TAZOBACTAM SODIUM 3.38 G: 3; .375 INJECTION, POWDER, LYOPHILIZED, FOR SOLUTION INTRAVENOUS at 17:37

## 2018-11-13 RX ADMIN — HYDRALAZINE HYDROCHLORIDE 5 MG: 20 INJECTION INTRAMUSCULAR; INTRAVENOUS at 16:37

## 2018-11-13 RX ADMIN — CLONIDINE HYDROCHLORIDE 0.3 MG: 0.1 TABLET ORAL at 19:57

## 2018-11-13 RX ADMIN — LABETALOL 20 MG/4 ML (5 MG/ML) INTRAVENOUS SYRINGE 10 MG: at 14:46

## 2018-11-13 RX ADMIN — SODIUM CHLORIDE 1000 ML: 0.9 INJECTION, SOLUTION INTRAVENOUS at 12:48

## 2018-11-13 RX ADMIN — DIPHENHYDRAMINE HYDROCHLORIDE 50 MG: 50 INJECTION INTRAMUSCULAR; INTRAVENOUS at 21:38

## 2018-11-13 RX ADMIN — PIPERACILLIN SODIUM AND TAZOBACTAM SODIUM 3.38 G: 3; .375 INJECTION, POWDER, LYOPHILIZED, FOR SOLUTION INTRAVENOUS at 23:02

## 2018-11-13 RX ADMIN — SODIUM CHLORIDE, POTASSIUM CHLORIDE, SODIUM LACTATE AND CALCIUM CHLORIDE 150 ML/HR: 600; 310; 30; 20 INJECTION, SOLUTION INTRAVENOUS at 19:45

## 2018-11-13 RX ADMIN — ONDANSETRON 4 MG: 2 INJECTION INTRAMUSCULAR; INTRAVENOUS at 12:48

## 2018-11-13 RX ADMIN — HYDROMORPHONE HYDROCHLORIDE 1 MG: 1 INJECTION, SOLUTION INTRAMUSCULAR; INTRAVENOUS; SUBCUTANEOUS at 12:46

## 2018-11-13 RX ADMIN — METOPROLOL TARTRATE 50 MG: 50 TABLET ORAL at 19:57

## 2018-11-13 RX ADMIN — SODIUM CHLORIDE 1000 ML: 0.9 INJECTION, SOLUTION INTRAVENOUS at 14:19

## 2018-11-13 RX ADMIN — PREGABALIN 200 MG: 100 CAPSULE ORAL at 19:57

## 2018-11-13 RX ADMIN — KETOROLAC TROMETHAMINE 60 MG: 30 INJECTION, SOLUTION INTRAMUSCULAR; INTRAVENOUS at 14:45

## 2018-11-13 RX ADMIN — NICOTINE 21 MG: 21 PATCH, EXTENDED RELEASE TRANSDERMAL at 20:39

## 2018-11-13 NOTE — ED PROVIDER NOTES
History  Chief Complaint   Patient presents with    Abdominal Pain     right upper quadrant abdominal pain, began two weeks ago and is acutly worse from overnight  no N,V,D  however patient states he had the same pain around a year and a half ago and was gold he needed his gallbladder out  Patient presents emergency room with complaint of abdominal right upper quadrant pain  He states the pain began about 2 weeks ago and waxed and waned until yesterday he stated the pain became more severe and has now been constant since last evening  Describes the pain as sharp stabbing he describes it worst 10/10  Denies objective fever but feels he has been warm  Denies chills denies nausea vomiting admits to diarrhea he states it is yellow  He states the diarrhea began yesterday  He has not been able to take anything that has helped over-the-counter  Denies any urinary complaints at this time admits to intermittent headache  Patient denies chest pain and shortness of breath  Had a previous surgical hernia umbilical repair years ago  He also states similar symptoms occur approximately 1 year ago was hospitalized and he states told he had stones in his gallbladder but never had his gallbladder taken out at that time  History provided by:  Patient   used: No      Allergies   Allergen Reactions    Codeine Itching    Suboxone [Buprenorphine Hcl-Naloxone Hcl]      Dug self apart         Prior to Admission Medications   Prescriptions Last Dose Informant Patient Reported? Taking?    DULoxetine (CYMBALTA) 60 mg delayed release capsule   No No   Sig: Take 1 capsule (60 mg total) by mouth daily   Patient not taking: Reported on 10/27/2018    cloNIDine (CATAPRES) 0 3 mg tablet   No No   Sig: Take 1 tablet (0 3 mg total) by mouth 2 (two) times a day for 14 days   furosemide (LASIX) 40 mg tablet   No No   Sig: Take 1 tablet (40 mg total) by mouth daily   Patient not taking: Reported on 10/27/2018 metoprolol tartrate (LOPRESSOR) 50 mg tablet   No No   Sig: Take 1 tablet (50 mg total) by mouth 2 (two) times a day for 14 days   pregabalin (LYRICA) 200 MG capsule   Yes No   Sig: Take 1 capsule by mouth 2 (two) times a day   tamsulosin (FLOMAX) 0 4 mg   Yes No   Sig: Take 1 capsule by mouth daily      Facility-Administered Medications: None       Past Medical History:   Diagnosis Date    Anxiety 5/2/2018    Arthritis     Chest pain     Last Assessed: 10/5/2016    Hypertension     Kidney stone     Obesity     Pleural effusion     Last Assessed: 9/15/2017    Pneumonia     Sciatica        Past Surgical History:   Procedure Laterality Date    CHEST TUBE INSERTION      Last Assessed: 9/15/2017    HERNIA REPAIR  2011    LUMBAR LAMINECTOMY  2009    TONSILLECTOMY      Last Assessed: 9/15/2017       Family History   Problem Relation Age of Onset    Other Mother         Cardiac Disorder    Heart failure Mother     Diabetes Mother     Cancer Father     Liver cancer Father     Cancer Brother     Testicular cancer Brother     Lung cancer Brother     Liver cancer Family      I have reviewed and agree with the history as documented  Social History   Substance Use Topics    Smoking status: Current Every Day Smoker     Packs/day: 1 00     Types: Cigarettes    Smokeless tobacco: Never Used    Alcohol use No      Comment: Former consumption of alcohol        Review of Systems   Constitutional: Positive for diaphoresis  Negative for chills, fatigue and fever  HENT: Negative for congestion, ear pain, rhinorrhea, sneezing and sore throat  Respiratory: Negative for cough, shortness of breath, wheezing and stridor  Cardiovascular: Negative for chest pain, palpitations and leg swelling  Gastrointestinal: Positive for abdominal pain and diarrhea  Negative for abdominal distention, blood in stool, constipation, nausea and vomiting     Genitourinary: Negative for difficulty urinating, dysuria, frequency, hematuria and urgency  Musculoskeletal: Negative for gait problem, myalgias and neck pain  Skin: Negative for rash  Neurological: Positive for headaches  Negative for dizziness, syncope, weakness and light-headedness  All other systems reviewed and are negative  Physical Exam  Physical Exam   Constitutional: He is oriented to person, place, and time  He appears well-developed and well-nourished  HENT:   Head: Normocephalic and atraumatic  Eyes: Pupils are equal, round, and reactive to light  Conjunctivae and EOM are normal    Neck: Normal range of motion  Neck supple  No tracheal deviation present  Cardiovascular: Normal rate, regular rhythm, normal heart sounds and intact distal pulses  No murmur heard  Pulmonary/Chest: Effort normal and breath sounds normal  No respiratory distress  He has no wheezes  He has no rales  Abdominal: Soft  Bowel sounds are normal  He exhibits no distension and no mass  There is tenderness in the right upper quadrant  There is CVA tenderness (Right side) and positive Richey's sign  Morbid obesity   Musculoskeletal: Normal range of motion  He exhibits no edema or tenderness  Neurological: He is alert and oriented to person, place, and time  Skin: Skin is warm and dry  No rash noted  No erythema  Nursing note and vitals reviewed        Vital Signs  ED Triage Vitals   Temperature Pulse Respirations Blood Pressure SpO2   11/13/18 1215 11/13/18 1215 11/13/18 1215 11/13/18 1215 11/13/18 1215   97 5 °F (36 4 °C) (!) 129 22 (!) 243/122 98 %      Temp Source Heart Rate Source Patient Position - Orthostatic VS BP Location FiO2 (%)   11/13/18 1215 11/13/18 1850 11/13/18 1215 11/13/18 1215 --   Temporal Monitor Sitting Left arm       Pain Score       11/13/18 1215       9           Vitals:    11/13/18 1430 11/13/18 1515 11/13/18 1530 11/13/18 1850   BP: (!) 179/107 (!) 175/95 (!) 165/109 160/92   Pulse: 87 72 68 90   Patient Position - Orthostatic VS: Sitting       Visual Acuity      ED Medications  Medications   ketorolac (TORADOL) injection 15 mg (not administered)   ondansetron (ZOFRAN) injection 4 mg (not administered)   piperacillin-tazobactam (ZOSYN) 3 375 g in sodium chloride 0 9 % 50 mL IVPB (not administered)   lactated ringers infusion (not administered)   magnesium hydroxide (MILK OF MAGNESIA) 400 mg/5 mL oral suspension 30 mL (not administered)   heparin (porcine) subcutaneous injection 5,000 Units (not administered)   sodium chloride 0 9 % bolus 1,000 mL (0 mL Intravenous Stopped 11/13/18 1419)   ondansetron (ZOFRAN) injection 4 mg (4 mg Intravenous Given 11/13/18 1248)   HYDROmorphone (DILAUDID) injection 1 mg (1 mg Intravenous Given 11/13/18 1246)   sodium chloride 0 9 % bolus 1,000 mL (0 mL Intravenous Stopped 11/13/18 1519)   labetalol (NORMODYNE) injection 10 mg (10 mg Intravenous Given 11/13/18 1446)   ketorolac (TORADOL) injection 60 mg (60 mg Intramuscular Given 11/13/18 1445)   hydrALAZINE (APRESOLINE) injection 5 mg (5 mg Intravenous Given 11/13/18 1637)   piperacillin-tazobactam (ZOSYN) 3 375 g in sodium chloride 0 9 % 50 mL IVPB (0 g Intravenous Stopped 11/13/18 1753)       Diagnostic Studies  Results Reviewed     Procedure Component Value Units Date/Time    Platelet count [[id number]]     Lab Status:  No result Specimen:  Blood     Urine Microscopic [[id number]]  (Abnormal) Collected:  11/13/18 1643    Lab Status:  Final result Specimen:  Urine from Urine, Clean Catch Updated:  11/13/18 1656     RBC, UA Innumerable (A) /hpf      WBC, UA 4-10 (A) /hpf      Epithelial Cells Occasional /hpf      Bacteria, UA Occasional /hpf      Ca Oxalate Brigitte, UA Occasional (A) /hpf      MUCUS THREADS Occasional (A)    UA w Reflex to Microscopic w Reflex to Culture [[id number]]  (Abnormal) Collected:  11/13/18 1643    Lab Status:  Final result Specimen:  Urine from Urine, Clean Catch Updated:  11/13/18 1649     Color, UA Melanie (A)     Clarity, UA Cloudy (A)     Specific Gravity, UA >=1 030 (H)     pH, UA 5 5     Leukocytes, UA Negative     Nitrite, UA Positive (A)     Protein, UA 2+ (A) mg/dl      Glucose, UA Negative mg/dl      Ketones, UA Negative mg/dl      Urobilinogen, UA 0 2 E U /dl      Bilirubin, UA 1+ (A)     Blood, UA 3+ (A)    Lactic acid, plasma [[id number]]  (Normal) Collected:  11/13/18 1419    Lab Status:  Final result Specimen:  Blood from Arm, Right Updated:  11/13/18 1453     LACTIC ACID 1 5 mmol/L     Narrative:         Result may be elevated if tourniquet was used during collection  Blood culture #2 [[id number]] Collected:  11/13/18 1419    Lab Status: In process Specimen:  Blood from Arm, Right Updated:  11/13/18 1425    Blood culture #1 [[id number]] Collected:  11/13/18 1419    Lab Status: In process Specimen:  Blood from Arm, Right Updated:  11/13/18 1425    Lactic acid, plasma [[id number]]  (Abnormal) Collected:  11/13/18 1235    Lab Status:  Final result Specimen:  Blood from Arm, Left Updated:  11/13/18 1343     LACTIC ACID 3 2 (HH) mmol/L     Narrative:         Result may be elevated if tourniquet was used during collection      Lipase [[id number]]  (Normal) Collected:  11/13/18 1235    Lab Status:  Final result Specimen:  Blood from Arm, Left Updated:  11/13/18 1342     Lipase 25 u/L     Comprehensive metabolic panel [[id number]]  (Abnormal) Collected:  11/13/18 1235    Lab Status:  Final result Specimen:  Blood from Arm, Left Updated:  11/13/18 1342     Sodium 133 (L) mmol/L      Potassium 3 9 mmol/L      Chloride 98 mmol/L      CO2 24 mmol/L      ANION GAP 11 mmol/L      BUN 13 mg/dL      Creatinine 1 23 mg/dL      Glucose 167 (H) mg/dL      Calcium 10 1 mg/dL      AST 24 U/L      ALT 26 U/L      Alkaline Phosphatase 94 U/L      Total Protein 8 6 g/dL      Albumin 4 9 g/dL      Total Bilirubin 0 90 mg/dL      eGFR 66 ml/min/1 73sq m     Narrative:         National Kidney Disease Education Program recommendations are as follows:  GFR calculation is accurate only with a steady state creatinine  Chronic Kidney disease less than 60 ml/min/1 73 sq  meters  Kidney failure less than 15 ml/min/1 73 sq  meters  Protime-INR [[id number]]  (Normal) Collected:  11/13/18 1235    Lab Status:  Final result Specimen:  Blood from Arm, Left Updated:  11/13/18 1336     Protime 12 7 seconds      INR 1 09    APTT [[id number]]  (Abnormal) Collected:  11/13/18 1235    Lab Status:  Final result Specimen:  Blood from Arm, Left Updated:  11/13/18 1336     PTT 44 (H) seconds     CBC and differential [[id number]]  (Abnormal) Collected:  11/13/18 1235    Lab Status:  Final result Specimen:  Blood from Arm, Left Updated:  11/13/18 1324     WBC 13 30 (H) Thousand/uL      RBC 5 51 Million/uL      Hemoglobin 16 7 g/dL      Hematocrit 50 6 (H) %      MCV 92 fL      MCH 30 2 pg      MCHC 32 9 g/dL      RDW 13 5 %      MPV 9 3 fL      Platelets 815 Thousands/uL      nRBC 0 /100 WBCs      Neutrophils Relative 76 (H) %      Lymphocytes Relative 14 (L) %      Monocytes Relative 7 %      Eosinophils Relative 2 %      Basophils Relative 1 %      Neutrophils Absolute 10 10 (H) Thousands/µL      Lymphocytes Absolute 1 80 Thousands/µL      Monocytes Absolute 1 00 Thousand/µL      Eosinophils Absolute 0 30 Thousand/µL      Basophils Absolute 0 10 Thousands/µL                  US right upper quadrant   Final Result by Shelby Vela (11/13 4267)   Diffuse hepatic steatosis  This is seen on the prior CT  Gallbladder findings suggesting adenomyomatosis  No cholelithiasis,   gallbladder wall thickening, or biliary dilatation is appreciated, however   the sonographic Saadia Kt sign is reported positive  Correlate clinically   and HIDA scan could further evaluate as clinically indicated                 Signed by Josefina Perez MD      CT abdomen pelvis wo contrast   Final Result by Jade Troy (11/13 6083)   There is a nonobstructive 7 mm calculi noted in the proximal left ureter causing mild dilatation of the collecting system of the left side  These   findings are notified to JANNIE Dela Cruz taking care of this patient   at 3:00 PM on 11/13/2018   Fatty liver  The appendix is within normal limits  Signed by Matilda Hernandez MD      NM inpatient order    (Results Pending)              Procedures  Procedures       Phone Contacts  ED Phone Contact    ED Course  ED Course as of Nov 13 1908   Tue Nov 13, 2018   1409 Lactate returned elevated 3 2  Blood cultures x2 ordered repeat lactate and continue fluid boluses  Repeat vitals were ordered as well patient afebrile  1513 Call received from [facility], discussed no acute findings on CT and right upper quadrant  Was advised of the renal calculi noted on left with very mild if any hydro as previously seen on CT    1818 Call placed to     1823 Call placed         call was placed initially to Dr Analilia Caruso is well waiting call back call placed to Dr jesus in regarding renal stone  Advised admission and he would place a stent tomorrow  Return call from Dr Donna Edwards as he advised at this time HIDA scan need to be completed before determining if patient is an acute surgical candidate  He requested discussing with Dr Jaimee Landeros of inpatient for admission on medical service due to multiple comorbidities and pathologies  I discussed with Dr Jaimee Landeros the case due to the need for likely surgical intervention he deferred admission back to surgery  Dr jacques as with happy to admit patient was placed on surgical service with need for possible intervention tomorrow following results of HIDA scan, as well as a surgical intervention by Dr Olivia Lopez  Initial Sepsis Screening     Row Name 11/13/18 1406                Is the patient's history suggestive of a new or worsening infection?  No  -KW        Suspected source of infection acute abdominal infection  -KW        Are two or more of the following signs & symptoms of infection both present and new to the patient? (!)  Yes (Proceed)  -KW        Indicate SIRS criteria Tachycardia > 90 bpm;Leukocytosis (WBC > 52265 IJL)  -KW        If the answer is yes to both questions, suspicion of sepsis is present          If severe sepsis is present AND tissue hypoperfusion perists in the hour after fluid resuscitation or lactate > 4, the patient meets criteria for SEPTIC SHOCK          Are any of the following organ dysfunction criteria present within 6 hours of suspected infection and SIRS criteria that are NOT considered to be chronic conditions? (!)  Yes  -KW        Organ dysfunction Lactate > 2 0 mmol/L  -KW        Date of presentation of severe sepsis          Time of presentation of severe sepsis          Tissue hypoperfusion persists in the hour after crystalloid fluid administration, evidenced, by either:          Was hypotension present within one hour of the conclusion of crystalloid fluid administration?           Date of presentation of septic shock          Time of presentation of septic shock            User Key  (r) = Recorded By, (t) = Taken By, (c) = Cosigned By    Initials Name Provider Type    KW Cooper Veloz PA-C Physician Assistant           Default Flowsheet Data (last 720 hours)      Sepsis Reassess     Row Name 11/13/18 4814                   Repeat Volume Status and Tissue Perfusion Assessment Performed    Repeat Volume Status and Tissue Perfusion Assessment Performed Yes  -KW           Volume Status and Tissue Perfusion Post Fluid Resuscitation- Must Document 5 of the Following 7:    Vital Signs Reviewed (HR, RR, BP, T) Yes  -KW        Arterial Oxygen Saturation Reviewed (POx, SaO2 or SpO2) Yes (comment %)  -KW        Cardio Regular rate and rhythm  -KW        Pulmonary Normal effort  -KW        Capillary Refill Brisk  -KW        Peripheral Pulses          Skin Warm;Dry  -KW        Urine output assessed             *OR*   Intensive Monitoring- Must Document One of the Following Four *:    Vital Signs Reviewed          * Central Venous Pressure (CVP or RAP)          * Central Venous Oxygen (SVO2, ScvO2 or Oxygen saturation via central catheter)          * Bedside Cardiovascular US in IVC diameter and % collapse          * Passive Leg Raise OR Crystalloid Challenge            User Key  (r) = Recorded By, (t) = Taken By, (c) = Cosigned By    Initials Name Provider Type    Pako Wynne PA-C Physician Assistant                MDM  Number of Diagnoses or Management Options     Amount and/or Complexity of Data Reviewed  Clinical lab tests: ordered and reviewed    Risk of Complications, Morbidity, and/or Mortality  Presenting problems: moderate  Diagnostic procedures: moderate  Management options: moderate    Patient Progress  Patient progress: stable    CritCare Time    Disposition  Final diagnoses:   Abdominal pain, acute, right upper quadrant   Renal calculus, left obstructing 7mm stone obstructing   Uncontrolled hypertension   Sepsis ([facility] [address] )   History of prescription drug abuse     Time reflects when diagnosis was documented in both MDM as applicable and the Disposition within this note     Time User Action Codes Description Comment    11/13/2018  6:25 PM Kevin Gonzalezdy Add [R10 11] Abdominal pain, acute, right upper quadrant     11/13/2018  6:27 PM Richardean Gamaliel Add [N20 0] Renal calculus, left     11/13/2018  6:27 PM Karma Jeffrey M Modify [N20 0] Renal calculus, left obstructing 7mm stone     11/13/2018  6:46 PM Jenene Cabot Add [I10] Essential hypertension     11/13/2018  6:46 PM Jenene Cabot Modify [I10] Essential hypertension     11/13/2018  7:03 PM Karma Jeffrey M Modify [N20 0] Renal calculus, left obstructing 7mm stone obstructing     11/13/2018  7:03 PM Richardean Gamaliel Add [I10] Uncontrolled hypertension     11/13/2018  7:03 PM Karma Jeffrey M Add [A41 9] Sepsis ([facility] [address] )     11/13/2018  7:03 PM Karma Jeffrey M Modify [R10 11] Abdominal pain, acute, right upper quadrant     11/13/2018  7:03 PM Rubbie [place] Modify [A41 9] Sepsis ([address] )     11/13/2018  7:03 PM Rubbie [place] Add [I74 875] History of prescription drug abuse       ED Disposition     ED Disposition Condition Comment    Admit  Case was discussed with surgery, Dr Manny Carey and the patient's admission status was agreed to be Admission Status: inpatient to the service of Dr Manny Carey   Follow-up Information    None         Current Discharge Medication List      CONTINUE these medications which have NOT CHANGED    Details   cloNIDine (CATAPRES) 0 3 mg tablet Take 1 tablet (0 3 mg total) by mouth 2 (two) times a day for 14 days  Qty: 28 tablet, Refills: 0    Associated Diagnoses: Generalized anxiety disorder      DULoxetine (CYMBALTA) 60 mg delayed release capsule Take 1 capsule (60 mg total) by mouth daily  Qty: 30 capsule, Refills: 0    Associated Diagnoses: Generalized anxiety disorder      furosemide (LASIX) 40 mg tablet Take 1 tablet (40 mg total) by mouth daily  Qty: 30 tablet, Refills: 2    Associated Diagnoses: Benign essential hypertension      metoprolol tartrate (LOPRESSOR) 50 mg tablet Take 1 tablet (50 mg total) by mouth 2 (two) times a day for 14 days  Qty: 28 tablet, Refills: 0    Associated Diagnoses: Benign essential hypertension      pregabalin (LYRICA) 200 MG capsule Take 1 capsule by mouth 2 (two) times a day      tamsulosin (FLOMAX) 0 4 mg Take 1 capsule by mouth daily           No discharge procedures on file      ED Provider  Electronically Signed by           Madeline Queen PA-C  11/13/18 6567

## 2018-11-13 NOTE — Clinical Note
Case was discussed with surgery, Dr Teetee Hicks and the patient's admission status was agreed to be Admission Status:observation to the service of Dr Teetee Hicks

## 2018-11-13 NOTE — SEPSIS NOTE
Sepsis Note   Andres Mondragon 46 y o  male MRN: [id number]  Unit/Bed#: ED 08 Encounter: [id number]            Initial Sepsis Screening     Row Name 11/13/18 1406                Is the patient's history suggestive of a new or worsening infection? No  -KW        Suspected source of infection acute abdominal infection  -KW        Are two or more of the following signs & symptoms of infection both present and new to the patient? (!)  Yes (Proceed)  -KW        Indicate SIRS criteria Tachycardia > 90 bpm;Leukocytosis (WBC > 94738 IJL)  -KW        If the answer is yes to both questions, suspicion of sepsis is present          If severe sepsis is present AND tissue hypoperfusion perists in the hour after fluid resuscitation or lactate > 4, the patient meets criteria for SEPTIC SHOCK          Are any of the following organ dysfunction criteria present within 6 hours of suspected infection and SIRS criteria that are NOT considered to be chronic conditions? (!)  Yes  -KW        Organ dysfunction Lactate > 2 0 mmol/L  -KW        Date of presentation of severe sepsis          Time of presentation of severe sepsis          Tissue hypoperfusion persists in the hour after crystalloid fluid administration, evidenced, by either:          Was hypotension present within one hour of the conclusion of crystalloid fluid administration?           Date of presentation of septic shock          Time of presentation of septic shock            User Key  (r) = Recorded By, (t) = Taken By, (c) = Cosigned By    Initials Name Provider Type    Darinel Calderon PA-C Physician Assistant

## 2018-11-14 ENCOUNTER — ANESTHESIA EVENT (OUTPATIENT)
Dept: MEDSURG UNIT | Facility: HOSPITAL | Age: 53
End: 2018-11-14

## 2018-11-14 ENCOUNTER — APPOINTMENT (INPATIENT)
Dept: RADIOLOGY | Facility: HOSPITAL | Age: 53
DRG: 661 | End: 2018-11-14
Payer: MEDICARE

## 2018-11-14 ENCOUNTER — ANESTHESIA (OUTPATIENT)
Dept: MEDSURG UNIT | Facility: HOSPITAL | Age: 53
End: 2018-11-14

## 2018-11-14 PROBLEM — F17.200 TOBACCO USE DISORDER: Chronic | Status: ACTIVE | Noted: 2017-09-15

## 2018-11-14 PROBLEM — R07.89 OTHER CHEST PAIN: Chronic | Status: ACTIVE | Noted: 2018-11-14

## 2018-11-14 PROBLEM — D72.829 LEUKOCYTOSIS: Chronic | Status: ACTIVE | Noted: 2018-11-13

## 2018-11-14 PROBLEM — E66.9 OBESITY: Chronic | Status: ACTIVE | Noted: 2017-09-15

## 2018-11-14 PROBLEM — K76.0 HEPATIC STEATOSIS: Chronic | Status: ACTIVE | Noted: 2017-09-15

## 2018-11-14 PROBLEM — R10.11 RUQ ABDOMINAL PAIN: Chronic | Status: ACTIVE | Noted: 2018-11-13

## 2018-11-14 PROBLEM — T78.40XA ALLERGIC REACTION CAUSED BY A DRUG: Chronic | Status: ACTIVE | Noted: 2018-11-13

## 2018-11-14 PROBLEM — N40.0 BENIGN PROSTATE HYPERPLASIA: Chronic | Status: ACTIVE | Noted: 2018-01-15

## 2018-11-14 PROBLEM — R07.89 OTHER CHEST PAIN: Status: ACTIVE | Noted: 2018-11-14

## 2018-11-14 PROBLEM — N20.1 LEFT URETERAL STONE: Chronic | Status: ACTIVE | Noted: 2018-11-13

## 2018-11-14 LAB
ALBUMIN SERPL BCP-MCNC: 3.6 G/DL (ref 3.5–5.7)
ALBUMIN SERPL BCP-MCNC: 4 G/DL (ref 3.5–5.7)
ALP SERPL-CCNC: 68 U/L (ref 40–150)
ALP SERPL-CCNC: 76 U/L (ref 40–150)
ALT SERPL W P-5'-P-CCNC: 19 U/L (ref 7–52)
ALT SERPL W P-5'-P-CCNC: 22 U/L (ref 7–52)
ANION GAP SERPL CALCULATED.3IONS-SCNC: 6 MMOL/L (ref 4–13)
ANION GAP SERPL CALCULATED.3IONS-SCNC: 6 MMOL/L (ref 4–13)
AST SERPL W P-5'-P-CCNC: 16 U/L (ref 13–39)
AST SERPL W P-5'-P-CCNC: 18 U/L (ref 13–39)
ATRIAL RATE: 75 BPM
BASOPHILS # BLD AUTO: 0.1 THOUSANDS/ΜL (ref 0–0.1)
BASOPHILS NFR BLD AUTO: 1 % (ref 0–2)
BILIRUB SERPL-MCNC: 1.3 MG/DL (ref 0.2–1)
BILIRUB SERPL-MCNC: 1.4 MG/DL (ref 0.2–1)
BUN SERPL-MCNC: 15 MG/DL (ref 7–25)
BUN SERPL-MCNC: 16 MG/DL (ref 7–25)
CALCIUM SERPL-MCNC: 8.7 MG/DL (ref 8.6–10.5)
CALCIUM SERPL-MCNC: 9 MG/DL (ref 8.6–10.5)
CHLORIDE SERPL-SCNC: 102 MMOL/L (ref 98–107)
CHLORIDE SERPL-SCNC: 103 MMOL/L (ref 98–107)
CO2 SERPL-SCNC: 25 MMOL/L (ref 21–31)
CO2 SERPL-SCNC: 27 MMOL/L (ref 21–31)
CREAT SERPL-MCNC: 1.18 MG/DL (ref 0.7–1.3)
CREAT SERPL-MCNC: 1.32 MG/DL (ref 0.7–1.3)
EOSINOPHIL # BLD AUTO: 0.4 THOUSAND/ΜL (ref 0–0.61)
EOSINOPHIL NFR BLD AUTO: 4 % (ref 0–5)
ERYTHROCYTE [DISTWIDTH] IN BLOOD BY AUTOMATED COUNT: 13.8 % (ref 11.5–14.5)
GFR SERPL CREATININE-BSD FRML MDRD: 61 ML/MIN/1.73SQ M
GFR SERPL CREATININE-BSD FRML MDRD: 70 ML/MIN/1.73SQ M
GLUCOSE SERPL-MCNC: 97 MG/DL (ref 65–99)
GLUCOSE SERPL-MCNC: 97 MG/DL (ref 65–99)
HCT VFR BLD AUTO: 41.2 % (ref 36.5–49.3)
HGB BLD-MCNC: 13.5 G/DL (ref 14–18)
LYMPHOCYTES # BLD AUTO: 1.9 THOUSANDS/ΜL (ref 0.6–4.47)
LYMPHOCYTES NFR BLD AUTO: 21 % (ref 21–51)
MAGNESIUM SERPL-MCNC: 2 MG/DL (ref 1.9–2.7)
MCH RBC QN AUTO: 30.5 PG (ref 26–34)
MCHC RBC AUTO-ENTMCNC: 32.9 G/DL (ref 31–37)
MCV RBC AUTO: 93 FL (ref 81–99)
MONOCYTES # BLD AUTO: 0.9 THOUSAND/ΜL (ref 0.17–1.22)
MONOCYTES NFR BLD AUTO: 9 % (ref 2–12)
NEUTROPHILS # BLD AUTO: 5.9 THOUSANDS/ΜL (ref 1.4–6.5)
NEUTS SEG NFR BLD AUTO: 65 % (ref 42–75)
NRBC BLD AUTO-RTO: 0 /100 WBCS
P AXIS: 21 DEGREES
PHOSPHATE SERPL-MCNC: 3.2 MG/DL (ref 3–5.5)
PLATELET # BLD AUTO: 203 THOUSANDS/UL (ref 149–390)
PMV BLD AUTO: 8.6 FL (ref 8.6–11.7)
POTASSIUM SERPL-SCNC: 3.5 MMOL/L (ref 3.5–5.5)
POTASSIUM SERPL-SCNC: 4 MMOL/L (ref 3.5–5.5)
PR INTERVAL: 140 MS
PROT SERPL-MCNC: 6.3 G/DL (ref 6.4–8.9)
PROT SERPL-MCNC: 6.9 G/DL (ref 6.4–8.9)
QRS AXIS: 20 DEGREES
QRSD INTERVAL: 72 MS
QT INTERVAL: 408 MS
QTC INTERVAL: 455 MS
RBC # BLD AUTO: 4.44 MILLION/UL (ref 4.3–5.9)
SODIUM SERPL-SCNC: 134 MMOL/L (ref 134–143)
SODIUM SERPL-SCNC: 135 MMOL/L (ref 134–143)
T WAVE AXIS: 39 DEGREES
TROPONIN I SERPL-MCNC: <0.03 NG/ML
VENTRICULAR RATE: 75 BPM
WBC # BLD AUTO: 9.1 THOUSAND/UL (ref 4.8–10.8)

## 2018-11-14 PROCEDURE — 93005 ELECTROCARDIOGRAM TRACING: CPT

## 2018-11-14 PROCEDURE — 80053 COMPREHEN METABOLIC PANEL: CPT | Performed by: NURSE PRACTITIONER

## 2018-11-14 PROCEDURE — 93010 ELECTROCARDIOGRAM REPORT: CPT | Performed by: INTERNAL MEDICINE

## 2018-11-14 PROCEDURE — 84484 ASSAY OF TROPONIN QUANT: CPT | Performed by: NURSE PRACTITIONER

## 2018-11-14 PROCEDURE — 80053 COMPREHEN METABOLIC PANEL: CPT | Performed by: SURGERY

## 2018-11-14 PROCEDURE — 99233 SBSQ HOSP IP/OBS HIGH 50: CPT | Performed by: NURSE PRACTITIONER

## 2018-11-14 PROCEDURE — 83735 ASSAY OF MAGNESIUM: CPT | Performed by: SURGERY

## 2018-11-14 PROCEDURE — 99222 1ST HOSP IP/OBS MODERATE 55: CPT | Performed by: PHYSICIAN ASSISTANT

## 2018-11-14 PROCEDURE — 85025 COMPLETE CBC W/AUTO DIFF WBC: CPT | Performed by: SURGERY

## 2018-11-14 PROCEDURE — 71045 X-RAY EXAM CHEST 1 VIEW: CPT

## 2018-11-14 PROCEDURE — 74018 RADEX ABDOMEN 1 VIEW: CPT

## 2018-11-14 PROCEDURE — 84100 ASSAY OF PHOSPHORUS: CPT | Performed by: SURGERY

## 2018-11-14 RX ORDER — ONDANSETRON 2 MG/ML
4 INJECTION INTRAMUSCULAR; INTRAVENOUS ONCE
Status: COMPLETED | OUTPATIENT
Start: 2018-11-14 | End: 2018-11-14

## 2018-11-14 RX ORDER — ONDANSETRON 2 MG/ML
4 INJECTION INTRAMUSCULAR; INTRAVENOUS ONCE AS NEEDED
Status: COMPLETED | OUTPATIENT
Start: 2018-11-14 | End: 2018-11-14

## 2018-11-14 RX ORDER — HYDROMORPHONE HCL/PF 1 MG/ML
0.5 SYRINGE (ML) INJECTION
Status: DISCONTINUED | OUTPATIENT
Start: 2018-11-14 | End: 2018-11-17 | Stop reason: HOSPADM

## 2018-11-14 RX ORDER — SODIUM CHLORIDE 9 MG/ML
125 INJECTION, SOLUTION INTRAVENOUS CONTINUOUS
Status: DISCONTINUED | OUTPATIENT
Start: 2018-11-14 | End: 2018-11-17 | Stop reason: HOSPADM

## 2018-11-14 RX ORDER — HYDRALAZINE HYDROCHLORIDE 20 MG/ML
5 INJECTION INTRAMUSCULAR; INTRAVENOUS ONCE
Status: COMPLETED | OUTPATIENT
Start: 2018-11-14 | End: 2018-11-14

## 2018-11-14 RX ORDER — NITROGLYCERIN 0.4 MG/1
0.4 TABLET SUBLINGUAL
Status: DISCONTINUED | OUTPATIENT
Start: 2018-11-14 | End: 2018-11-17 | Stop reason: HOSPADM

## 2018-11-14 RX ORDER — HYDROMORPHONE HCL/PF 1 MG/ML
0.5 SYRINGE (ML) INJECTION ONCE
Status: COMPLETED | OUTPATIENT
Start: 2018-11-14 | End: 2018-11-14

## 2018-11-14 RX ADMIN — NITROGLYCERIN 0.4 MG: 0.4 TABLET SUBLINGUAL at 10:09

## 2018-11-14 RX ADMIN — PIPERACILLIN SODIUM AND TAZOBACTAM SODIUM 3.38 G: 3; .375 INJECTION, POWDER, LYOPHILIZED, FOR SOLUTION INTRAVENOUS at 12:52

## 2018-11-14 RX ADMIN — PIPERACILLIN SODIUM AND TAZOBACTAM SODIUM 3.38 G: 3; .375 INJECTION, POWDER, LYOPHILIZED, FOR SOLUTION INTRAVENOUS at 17:30

## 2018-11-14 RX ADMIN — METOPROLOL TARTRATE 50 MG: 50 TABLET ORAL at 17:33

## 2018-11-14 RX ADMIN — HYDROMORPHONE HYDROCHLORIDE 0.5 MG: 1 INJECTION, SOLUTION INTRAMUSCULAR; INTRAVENOUS; SUBCUTANEOUS at 18:54

## 2018-11-14 RX ADMIN — MORPHINE SULFATE 1 MG: 2 INJECTION, SOLUTION INTRAMUSCULAR; INTRAVENOUS at 10:25

## 2018-11-14 RX ADMIN — METOPROLOL TARTRATE 50 MG: 50 TABLET ORAL at 12:53

## 2018-11-14 RX ADMIN — NICOTINE 21 MG: 21 PATCH, EXTENDED RELEASE TRANSDERMAL at 08:20

## 2018-11-14 RX ADMIN — SODIUM CHLORIDE 125 ML/HR: 9 INJECTION, SOLUTION INTRAVENOUS at 08:19

## 2018-11-14 RX ADMIN — PREGABALIN 200 MG: 100 CAPSULE ORAL at 17:33

## 2018-11-14 RX ADMIN — HYDROMORPHONE HYDROCHLORIDE 0.5 MG: 1 INJECTION, SOLUTION INTRAMUSCULAR; INTRAVENOUS; SUBCUTANEOUS at 12:49

## 2018-11-14 RX ADMIN — HYDRALAZINE HYDROCHLORIDE 5 MG: 20 INJECTION INTRAMUSCULAR; INTRAVENOUS at 10:43

## 2018-11-14 RX ADMIN — DULOXETINE HYDROCHLORIDE 60 MG: 60 CAPSULE, DELAYED RELEASE ORAL at 12:53

## 2018-11-14 RX ADMIN — HEPARIN SODIUM 5000 UNITS: 5000 INJECTION, SOLUTION INTRAVENOUS; SUBCUTANEOUS at 13:58

## 2018-11-14 RX ADMIN — HYDROMORPHONE HYDROCHLORIDE 0.5 MG: 1 INJECTION, SOLUTION INTRAMUSCULAR; INTRAVENOUS; SUBCUTANEOUS at 16:01

## 2018-11-14 RX ADMIN — PREGABALIN 200 MG: 100 CAPSULE ORAL at 12:54

## 2018-11-14 RX ADMIN — ONDANSETRON 4 MG: 2 INJECTION INTRAMUSCULAR; INTRAVENOUS at 10:09

## 2018-11-14 RX ADMIN — ONDANSETRON 4 MG: 2 INJECTION INTRAMUSCULAR; INTRAVENOUS at 10:10

## 2018-11-14 RX ADMIN — PIPERACILLIN SODIUM AND TAZOBACTAM SODIUM 3.38 G: 3; .375 INJECTION, POWDER, LYOPHILIZED, FOR SOLUTION INTRAVENOUS at 04:06

## 2018-11-14 RX ADMIN — HYDROMORPHONE HYDROCHLORIDE 0.5 MG: 1 INJECTION, SOLUTION INTRAMUSCULAR; INTRAVENOUS; SUBCUTANEOUS at 10:35

## 2018-11-14 RX ADMIN — PIPERACILLIN SODIUM AND TAZOBACTAM SODIUM 3.38 G: 3; .375 INJECTION, POWDER, LYOPHILIZED, FOR SOLUTION INTRAVENOUS at 23:05

## 2018-11-14 RX ADMIN — HYDROMORPHONE HYDROCHLORIDE 0.5 MG: 1 INJECTION, SOLUTION INTRAMUSCULAR; INTRAVENOUS; SUBCUTANEOUS at 23:05

## 2018-11-14 RX ADMIN — SODIUM CHLORIDE 125 ML/HR: 9 INJECTION, SOLUTION INTRAVENOUS at 17:59

## 2018-11-14 RX ADMIN — SODIUM CHLORIDE, POTASSIUM CHLORIDE, SODIUM LACTATE AND CALCIUM CHLORIDE 150 ML/HR: 600; 310; 30; 20 INJECTION, SOLUTION INTRAVENOUS at 04:06

## 2018-11-14 RX ADMIN — CLONIDINE HYDROCHLORIDE 0.3 MG: 0.1 TABLET ORAL at 12:54

## 2018-11-14 RX ADMIN — CLONIDINE HYDROCHLORIDE 0.3 MG: 0.1 TABLET ORAL at 17:33

## 2018-11-14 NOTE — ASSESSMENT & PLAN NOTE
· Urology to see in am  · Will make patient NPO  · CT abd/pelvis: There is a nonobstructive 7 mm calculi noted in the proximal left ureter causing mild dilatation of the collecting system of the left side    · Surgery states that stone will be managed  with lithotripsy

## 2018-11-14 NOTE — ASSESSMENT & PLAN NOTE
· BP mildly up, likely with pain  · Continue home meds and prn hydralazine  · Patient is currently NPO we will continue with p r n  IV hydralazine

## 2018-11-14 NOTE — PROGRESS NOTES
Progress Note - Khris Toro 1965, 46 y o  male MRN: [id number]    Unit/Bed#: -01 Encounter: [id number]    Primary Care Provider: Garrison Signs, DO   Date and time admitted to hospital: 11/13/2018 12:13 PM        * Other chest pain   Assessment & Plan    · Called the patient room at 10:00 a m  · Patient states he has centralized chest pain that occasionally moves into his left shoulder, does not radiate into his jaw or throat or back  · His initial blood pressure was 188/98, he is currently NPO for a possible procedure and has not taken his blood pressure medications  · EKG was obtain shows normal sinus rhythm heart rate of 75  · Troponin and cmP were drawn  · Zofran was administered IV  · Patient will receive 0 5 mg IV Dilaudid  · Patient was given nitroglycerin sublingual x2 tablets  · Patient states his chest pain is improving  · Blood pressures are improving as well     Essential hypertension   Assessment & Plan    · BP mildly up, likely with pain  · Continue home meds and prn hydralazine  · Patient is currently NPO we will continue with p r n  IV hydralazine     RUQ abdominal pain   Assessment & Plan    · RUQ US: Diffuse hepatic steatosis   This is seen on the prior CT  Gallbladder findings suggesting adenomyomatosis   No cholelithiasis,  gallbladder wall thickening, or biliary dilatation is appreciated, however the sonographic Doll Prom sign is reported positive   Correlate clinically  and HIDA scan could further evaluate as clinically indicated  · Abx per Sx  · HIDA scan in AM  · Abdominal KUB is ordered  · Chest x-ray has been ordered  · Bladder scan for PVR has been ordered  · Patient is NPO       Left ureteral stone   Assessment & Plan    · Urology to see in am  · Will make patient NPO  · CT abd/pelvis: There is a nonobstructive 7 mm calculi noted in the proximal left ureter causing mild dilatation of the collecting system of the left side    · Surgery states that stone will be managed  with lithotripsy     Leukocytosis   Assessment & Plan    · Possibly secondary to RUQ US abnormality  · Continue abx and monitor  · 11/14/2018, resolved     Generalized anxiety disorder   Assessment & Plan    · cymbalta when able to take p o  · Will order p r n  Ativan IV  · This is a chronic condition  · He is currently stable     Obesity   Assessment & Plan    · BMI 39  · Weight loss recommended, follow up with PCP to consider dietician referral     Hepatic steatosis   Assessment & Plan    · Noted on CT  · Chronic  · stable     Allergic reaction caused by a drug   Assessment & Plan    · Possibly secondary to Toradol  · Patient reports itching, hives and sweats approximately 30 minutes after each injection  He did not tell the ER staff when it happened     Benign prostate hyperplasia   Assessment & Plan    · Continue flomax  · Chronic  · Stable      Tobacco use disorder   Assessment & Plan    · Nicotine patch         VTE Pharmacologic Prophylaxis: Pharmacologic: Heparin    Patient Centered Rounds: I have performed bedside rounds with nursing staff today  Discussions with Specialists or Other Care Team Provider: none at this time  Education and Discussions with Family / Patient: medications for current situation    Time Spent for Care: 30 minutes  More than 50% of total time spent on counseling and coordination of care as described above  Current Length of Stay: 1 day(s)    Current Patient Status: Inpatient   Certification Statement: The patient will continue to require additional inpatient hospital stay due to Pain, chest pain, rule out ACS, possible surgical intervention  Discharge Plan:  Not applicable at this moment    Code Status: No Order    Subjective:   Patient is lying in bed writhing in pain  He states that his chest pain is centralized to his sternum and occasionally shoots into his left shoulder  He denies any pain in his throat jaw or into his back    He does complain of left flank pain secondary to his kidney stone  He also complains of right upper quadrant pain secondary to renal ultrasound and finding of adenomyomatosis with positive Richey sign  Chest pain was accompanied by a blood pressure of 188/98  Patient is NPO secondary to possible surgical intervention on 11/15/2018  Patient was administered nitroglycerin sublingual with immediate improvement of his pain which was initially rated 8/10  We are continuing to monitor the patient's pain level and blood pressure with the 2nd dose of oral nitroglycerin sublingual     Patient did have EKG that showed normal sinus rhythm with a heart rate of 75  He also had a cm P and troponin levels drawn  Patient was administered Zofran I V  Secondary to the patient's pain level he was administered 0 5 mg IV of Dilaudid  Objective:     Vitals:   Temp (24hrs), Av 1 °F (36 7 °C), Min:97 5 °F (36 4 °C), Max:98 6 °F (37 °C)    Temp:  [97 5 °F (36 4 °C)-98 6 °F (37 °C)] 98 6 °F (37 °C)  HR:  [] 63  Resp:  [18-22] 18  BP: (117-243)/() 140/92  SpO2:  [94 %-98 %] 97 %  Body mass index is 39 62 kg/m²  Input and Output Summary (last 24 hours): Intake/Output Summary (Last 24 hours) at 18 1033  Last data filed at 18 2208   Gross per 24 hour   Intake             1290 ml   Output                0 ml   Net             1290 ml       Physical Exam:     Physical Exam   Constitutional: He is oriented to person, place, and time  He appears well-developed and well-nourished  He is cooperative  HENT:   Head: Normocephalic and atraumatic  Nose: Nose normal    Mouth/Throat: Mucous membranes are normal    Eyes: Pupils are equal, round, and reactive to light  Conjunctivae and EOM are normal    Neck: Normal range of motion and full passive range of motion without pain  Neck supple  Cardiovascular: Normal rate, regular rhythm, normal heart sounds and normal pulses          Pulmonary/Chest: Effort normal and breath sounds normal    Patient is taking slightly shallow breaths secondary to pain  Abdominal: Soft  Normal appearance and bowel sounds are normal        Musculoskeletal:        Back:    Neurological: He is alert and oriented to person, place, and time  Skin: Skin is warm, dry and intact  Psychiatric: He has a normal mood and affect  His speech is normal and behavior is normal        Additional Data:     Labs:      Results from last 7 days  Lab Units 11/14/18  0449   WBC Thousand/uL 9 10   HEMOGLOBIN g/dL 13 5*   HEMATOCRIT % 41 2   PLATELETS Thousands/uL 203   NEUTROS PCT % 65   LYMPHS PCT % 21   MONOS PCT % 9   EOS PCT % 4       Results from last 7 days  Lab Units 11/14/18  0449   POTASSIUM mmol/L 3 5   CHLORIDE mmol/L 102   CO2 mmol/L 27   BUN mg/dL 16   CREATININE mg/dL 1 32*   CALCIUM mg/dL 8 7   ALK PHOS U/L 68   ALT U/L 19   AST U/L 16       Results from last 7 days  Lab Units 11/13/18  1235   INR  1 09               * I Have Reviewed All Lab Data Listed Above  * Additional Pertinent Lab Tests Reviewed: All Labs For Current Hospital Admission  Reviewed    Imaging:  Imaging Reports Reviewed Today Include:  Patient has scheduled chest x-ray, HIDA scan, abdominal KUB, bladder scanned for PVR      Recent Cultures (last 7 days):           Last 24 Hours Medication List:     Current Facility-Administered Medications:  cloNIDine 0 3 mg Oral BID Cesar Moore MD    diphenhydrAMINE 50 mg Intravenous Q6H PRN Suan Apley, PA-C    DULoxetine 60 mg Oral Daily Cesar Moore MD    heparin (porcine) 5,000 Units Subcutaneous [facility] Cesar Moore MD    hydrALAZINE 5 mg Intravenous Once BRAXTON Manzo    HYDROmorphone 0 5 mg Intravenous Once Abbott LaboratoriesBRAXTON    influenza vaccine 0 5 mL Intramuscular Prior to discharge Cesar Moore MD    lactated ringers 150 mL/hr Intravenous Continuous Cesar Moore MD Last Rate: 150 mL/hr (11/14/18 0406)   LORazepam 1 mg Intravenous Q6H PRN Suan Apley, SABA    magnesium hydroxide 30 mL Oral Daily PRN Joslyn Mcgill MD    metoprolol tartrate 50 mg Oral BID Joslyn Mcgill MD    nicotine 21 mg Transdermal Daily Julio César Roberson MD    nitroglycerin 0 4 mg Sublingual Q5 Min PRN BRAXTON Woody    ondansetron 4 mg Intravenous Q4H PRN Joslyn Mcgill MD    piperacillin-tazobactam 3 375 g Intravenous Q6H Joslyn Mcgill MD Last Rate: 3 375 g (11/14/18 0406)   pneumococcal 23-valent polysaccharide vaccine 0 5 mL Subcutaneous Prior to discharge Joslyn Mcgill MD    pregabalin 200 mg Oral BID Joslyn Mcgill MD    sodium chloride 125 mL/hr Intravenous Continuous Nory Blount PA-C Last Rate: 125 mL/hr (11/14/18 0819)   tamsulosin 0 4 mg Oral Daily Joslyn Mcgill MD         Today, Patient Was Seen By: BRAXTON Shepherd    ** Please Note: Dictation voice to text software may have been used in the creation of this document   **

## 2018-11-14 NOTE — H&P
H&P- Abdirahman Steven 1965, 46 y o  male MRN: [id number]    Unit/Bed#: -01 Encounter: [id number]    Primary Care Provider: Harry Baker DO   Date and time admitted to hospital: 11/13/2018 12:13 PM        Left ureteral stone   Assessment & Plan    Noted on CT with signs of mild hydronephrosis  Urology consulted  Fluid resuscitation  Trend labs  Essential hypertension   Assessment & Plan    Elevated BP in the ER  Internal medicine consulted for medical management  Continue to trend vitals  * RUQ abdominal pain   Assessment & Plan    The patient has generalized abdominal pain that he reports worse in the RUQ  CT and US negative for cholelithiasis or signs of cholecystitis  We will obtain HIDA to further investigate  Consult placed to urology for left sided ureterolithiasis  Continue supportive measures  History of Present Illness   HPI:  Abdirahman Steven is a 46 y o  male who presents with 2 week history of generalized abdominal pain  He was previously told he had gallbladder polyps and should have his gallbladder removed, he presented due to concern that he needs it removed now  Associated subjective fever, nausea, and one episode of loose yellow stools  Pain poorly localized  Does describe RUQ pain, but also left back pain  Stable since admission  Had itching and hives today that he reports secondary to Toradol  Denies prior reaction or complications with NSAIDs at home  Review of Systems   Constitutional: Positive for fever  Negative for chills  HENT: Negative for congestion  Eyes: Negative for visual disturbance  Respiratory: Negative for shortness of breath  Cardiovascular: Negative for chest pain  Gastrointestinal: Negative for abdominal pain, constipation, diarrhea, nausea and vomiting  Genitourinary: Negative for dysuria  Musculoskeletal: Negative for back pain  Skin: Negative for rash and wound  Neurological: Negative for dizziness and headaches  Psychiatric/Behavioral: Negative for confusion  Historical Information   Past Medical History:   Diagnosis Date    Anxiety 5/2/2018    Arthritis     Chest pain     Last Assessed: 10/5/2016    Hypertension     Kidney stone     Obesity     Pleural effusion     Last Assessed: 9/15/2017    Pneumonia     Sciatica      Past Surgical History:   Procedure Laterality Date    CHEST TUBE INSERTION      Last Assessed: 9/15/2017    HERNIA REPAIR  2011    LUMBAR LAMINECTOMY  2009    TONSILLECTOMY      Last Assessed: 9/15/2017     Social History   History   Alcohol Use No     Comment: Former consumption of alcohol     History   Drug Use    Types: Marijuana     History   Smoking Status    Current Every Day Smoker    Packs/day: 0 50    Types: Cigarettes   Smokeless Tobacco    Never Used     Family History:   Family History   Problem Relation Age of Onset    Other Mother         Cardiac Disorder    Heart failure Mother     Diabetes Mother     Cancer Father     Liver cancer Father     Cancer Brother     Testicular cancer Brother     Lung cancer Brother     Liver cancer Family        Meds/Allergies   PTA meds:   Prior to Admission Medications   Prescriptions Last Dose Informant Patient Reported? Taking?    DULoxetine (CYMBALTA) 60 mg delayed release capsule   No No   Sig: Take 1 capsule (60 mg total) by mouth daily   Patient not taking: Reported on 10/27/2018    cloNIDine (CATAPRES) 0 3 mg tablet   No No   Sig: Take 1 tablet (0 3 mg total) by mouth 2 (two) times a day for 14 days   furosemide (LASIX) 40 mg tablet   No No   Sig: Take 1 tablet (40 mg total) by mouth daily   Patient not taking: Reported on 10/27/2018    lisinopril (ZESTRIL) 40 mg tablet   Yes Yes   Sig: Take 40 mg by mouth daily   metoprolol tartrate (LOPRESSOR) 50 mg tablet   No No   Sig: Take 1 tablet (50 mg total) by mouth 2 (two) times a day for 14 days   pregabalin (LYRICA) 200 MG capsule   Yes No   Sig: Take 1 capsule by mouth 2 (two) times a day   tamsulosin (FLOMAX) 0 4 mg   Yes No   Sig: Take 1 capsule by mouth daily      Facility-Administered Medications: None     Allergies   Allergen Reactions    Codeine Itching    Suboxone [Buprenorphine Hcl-Naloxone Hcl]      Dug self apart    Toradol [Ketorolac Tromethamine]      Itching and hives       Objective   First Vitals:   Blood Pressure: (!) 243/122 (11/13/18 1215)  Pulse: (!) 129 (11/13/18 1215)  Temperature: 97 5 °F (36 4 °C) (11/13/18 1215)  Temp Source: Temporal (11/13/18 1215)  Respirations: 22 (11/13/18 1215)  Height: 6' (182 9 cm) (11/13/18 1215)  Weight - Scale: 127 kg (280 lb) (11/13/18 1215)  SpO2: 98 % (11/13/18 1215)    Current Vitals:   Blood Pressure: 140/92 (11/14/18 0708)  Pulse: 63 (11/14/18 0708)  Temperature: 98 6 °F (37 °C) (11/14/18 0708)  Temp Source: Tympanic (11/14/18 0708)  Respirations: 18 (11/14/18 0708)  Height: 6' (182 9 cm) (Stated) (11/13/18 1850)  Weight - Scale: 133 kg (292 lb 2 oz) (Standing scale) (11/13/18 1850)  SpO2: 97 % (11/14/18 0708)      Intake/Output Summary (Last 24 hours) at 11/14/18 0956  Last data filed at 11/13/18 2208   Gross per 24 hour   Intake             1290 ml   Output                0 ml   Net             1290 ml       Invasive Devices     Peripheral Intravenous Line            Peripheral IV 11/13/18 Left Antecubital less than 1 day                Physical Exam   Constitutional: He is oriented to person, place, and time  He appears well-developed and well-nourished  No distress  HENT:   Head: Normocephalic and atraumatic  Eyes: Pupils are equal, round, and reactive to light  Neck: Normal range of motion  Cardiovascular: Normal rate, regular rhythm and intact distal pulses  Murmur heard  Pulmonary/Chest: Effort normal and breath sounds normal  No respiratory distress  He has no wheezes  Abdominal: Soft  Bowel sounds are normal  He exhibits no distension and no mass  There is generalized tenderness   There is CVA tenderness  There is no rebound and no guarding  No hernia  Musculoskeletal: Normal range of motion  Neurological: He is alert and oriented to person, place, and time  Skin: Skin is warm and dry  He is not diaphoretic  Psychiatric: He has a normal mood and affect  Lab Results:   I have personally reviewed pertinent lab results  , CBC:   Lab Results   Component Value Date    WBC 9 10 11/14/2018    HGB 13 5 (L) 11/14/2018    HCT 41 2 11/14/2018    MCV 93 11/14/2018     11/14/2018    MCH 30 5 11/14/2018    MCHC 32 9 11/14/2018    RDW 13 8 11/14/2018    MPV 8 6 11/14/2018    NRBC 0 11/14/2018   , CMP:   Lab Results   Component Value Date    SODIUM 135 11/14/2018    K 3 5 11/14/2018     11/14/2018    CO2 27 11/14/2018    BUN 16 11/14/2018    CREATININE 1 32 (H) 11/14/2018    CALCIUM 8 7 11/14/2018    AST 16 11/14/2018    ALT 19 11/14/2018    ALKPHOS 68 11/14/2018    EGFR 61 11/14/2018     Imaging: I have personally reviewed pertinent reports  and I have personally reviewed pertinent films in PACS  EKG, Pathology, and Other Studies: I have personally reviewed pertinent reports  Code Status: No Order  Advance Directive and Living Will:      Power of :    POLST:      Counseling / Coordination of Care  Total floor / unit time spent today 25 minutes  Greater than 50% of total time was spent with the patient and / or family counseling and / or coordination of care  A description of the counseling / coordination of care: patient education, treatment planning

## 2018-11-14 NOTE — ASSESSMENT & PLAN NOTE
· Urology to see in am  · Will make patient NPO  · CT abd/pelvis:  There is a nonobstructive 7 mm calculi noted in the proximal left ureter causing mild dilatation of the collecting system of the left side

## 2018-11-14 NOTE — SOCIAL WORK
CM met with pt at bedside and explained role  Pt lives with his SO in a 1st floor apratment  0 [address]  Pt uses a cane to ambulate  He has walker and crutches at home  No other DME  Pt does not drive and reports he walks or gets a ride from his neighbor  Pt reports he is completely independent with all other ADL's  Pt PCP is Dr Kailee Murdock  He uses [facility]  Pt denies the need for any discharge services at this time  Pt indicates his neighbor will transport him home upon discharge  CM to follow as needed  CM reviewed d/c planning process including the following: identifying help at home, patient preference for d/c planning needs, availability of treatment team to discuss questions or concerns patient and/or family may have regarding understanding medications and recognizing signs and symptoms once discharged  CM also encouraged patient to follow up with all recommended appointments after discharge  Patient advised of importance for patient and family to participate in managing patients medical well being

## 2018-11-14 NOTE — ASSESSMENT & PLAN NOTE
· Possibly secondary to Toradol  · Patient reports itching, hives and sweats approximately 30 minutes after each injection    He did not tell the ER staff when it happened

## 2018-11-14 NOTE — PLAN OF CARE
DISCHARGE PLANNING     Discharge to home or other facility with appropriate resources Progressing        INFECTION - ADULT     Absence of fever/infection during neutropenic period Progressing        Knowledge Deficit     Patient/family/caregiver demonstrates understanding of disease process, treatment plan, medications, and discharge instructions Progressing        PAIN - ADULT     Verbalizes/displays adequate comfort level or baseline comfort level Progressing        SAFETY ADULT     Patient will remain free of falls Progressing     Maintain or return to baseline ADL function Progressing     Maintain or return mobility status to optimal level Progressing

## 2018-11-14 NOTE — PHYSICIAN ADVISOR
Current patient class: Inpatient  The patient is currently on Hospital Day: 2 at [address]      The patient was admitted to the hospital at 31 [phone] 62 on 11/13/18 for the following diagnosis:  Essential hypertension [I10]  Abdominal pain [R10 9]  Renal calculus, left [N20 0]  Abdominal pain, acute, right upper quadrant [R10 11]       There is documentation in the medical record of an expected length of stay of at least 2 midnights  The patient is therefore expected to satisfy the 2 midnight benchmark and given the 2 midnight presumption is appropriate for INPATIENT ADMISSION  Given this expectation of a satisfying stay, CMS instructs us that the patient is most often appropriate for inpatient admission under part A provided medical necessity is documented in the chart  After review of the relevant documentation, labs, vital signs and test results, the patient is appropriate for INPATIENT ADMISSION  Admission to the hospital as an inpatient is a complex decision making process which requires the practitioner to consider the patients presenting complaint, history and physical examination and all relevant testing  With this in mind, in this case, the patient was deemed appropriate for INPATIENT ADMISSION  After review of the documentation and testing available at the time of the admission I concur with this clinical determination of medical necessity  Rationale is as follows:    Patient is a 44-year-old male who presented to the emergency room on 11/13/2018 with a chief complaint of right upper quadrant abdominal pain of 2 weeks duration with acute worsening  In the ER patient was found to have white blood cell count of 13 and elevated lactic acid at 3 2  Ultrasound of the gallbladder showed no signs of acute cholecystitis but sonographic Richey sign was positive  Patient is scheduled for a HIDA scan today    CT of the abdomen and pelvis showed  a nonobstructive 7 mm calculus in the left proximal ureter causing mild dilation of the collecting system  Urology is consulted  Procedures being considered but timing will be based on patient's evaluation and treatment of right upper quadrant pain  Patient also developed chest pain  Troponins are being monitored  EKG was not concerning for ischemia  Patient currently is NPO for possible procedure for right upper quadrant pain and left ureteral stone  IV fluids are continued  Inpatient hospitalization is warranted for this patient  He will remain hospitalized for over 2 midnights      The patients vitals on arrival were ED Triage Vitals   Temperature Pulse Respirations Blood Pressure SpO2   11/13/18 1215 11/13/18 1215 11/13/18 1215 11/13/18 1215 11/13/18 1215   97 5 °F (36 4 °C) (!) 129 22 (!) 243/122 98 %      Temp Source Heart Rate Source Patient Position - Orthostatic VS BP Location FiO2 (%)   11/13/18 1215 11/13/18 1850 11/13/18 1215 11/13/18 1215 --   Temporal Monitor Sitting Left arm       Pain Score       11/13/18 1215       9           Past Medical History:   Diagnosis Date    Anxiety 5/2/2018    Arthritis     Chest pain     Last Assessed: 10/5/2016    Hypertension     Kidney stone     Obesity     Pleural effusion     Last Assessed: 9/15/2017    Pneumonia     Sciatica      Past Surgical History:   Procedure Laterality Date    CHEST TUBE INSERTION      Last Assessed: 9/15/2017    HERNIA REPAIR  2011    LUMBAR LAMINECTOMY  2009    TONSILLECTOMY      Last Assessed: 9/15/2017           Consults have been placed to:   IP CONSULT TO INTERNAL MEDICINE  IP CONSULT TO UROLOGY  IP CONSULT TO INTERNAL MEDICINE    Vitals:    11/13/18 1900 11/13/18 2321 11/14/18 0708 11/14/18 1515   BP:  117/81 140/92 128/82   BP Location:  Right arm Right arm Right arm   Pulse:  79 63 62   Resp:  18 18 20   Temp:  98 3 °F (36 8 °C) 98 6 °F (37 °C) 97 7 °F (36 5 °C)   TempSrc:  Tympanic Tympanic Temporal   SpO2: 98% 97% 97% 98%   Weight: Height:           Most recent labs:    Recent Labs      11/13/18   1235  11/14/18   0449  11/14/18   1010  11/14/18   1011   WBC  13 30*  9 10   --    --    HGB  16 7  13 5*   --    --    HCT  50 6*  41 2   --    --    PLT  289  203   --    --    K  3 9  3 5  4 0   --    CALCIUM  10 1  8 7  9 0   --    BUN  13  16  15   --    CREATININE  1 23  1 32*  1 18   --    LIPASE  25   --    --    --    INR  1 09   --    --    --    TROPONINI   --    --    --   <0 03   AST  24  16  18   --    ALT  26  19  22   --    ALKPHOS  94  68  76   --        Scheduled Meds:  Current Facility-Administered Medications:  cloNIDine 0 3 mg Oral BID Shandra aVllejo MD    diphenhydrAMINE 50 mg Intravenous Q6H PRN Cynthia Boo PA-C    DULoxetine 60 mg Oral Daily Shandra Vallejo MD    heparin (porcine) 5,000 Units Subcutaneous [facility] Shandra Vallejo MD    HYDROmorphone 0 5 mg Intravenous Q3H PRN Jordon Ruiz PA-C    influenza vaccine 0 5 mL Intramuscular Prior to discharge Shandra Vallejo MD    lactated ringers 150 mL/hr Intravenous Continuous Shandra Vallejo MD Last Rate: 150 mL/hr (11/14/18 0406)   LORazepam 1 mg Intravenous Q6H PRN Cynthia Boo PA-C    magnesium hydroxide 30 mL Oral Daily PRN Shandra Vallejo MD    metoprolol tartrate 50 mg Oral BID Shandra Vallejo MD    nicotine 21 mg Transdermal Daily Calos Hatfield MD    nitroglycerin 0 4 mg Sublingual Q5 Min PRN BRAXTON Woody    ondansetron 4 mg Intravenous Q4H PRN Shandra Vallejo MD    piperacillin-tazobactam 3 375 g Intravenous Q6H Shandra Vallejo MD Last Rate: 3 375 g (11/14/18 1252)   pneumococcal 23-valent polysaccharide vaccine 0 5 mL Subcutaneous Prior to discharge Shandra Vallejo MD    pregabalin 200 mg Oral BID Shandra Vallejo MD    sodium chloride 125 mL/hr Intravenous Continuous Angel Pal PA-C Last Rate: 125 mL/hr (11/14/18 0819)   tamsulosin 0 4 mg Oral Daily Shandra Vallejo MD      Continuous Infusions:  lactated ringers 150 mL/hr Last Rate: 150 mL/hr (11/14/18 0406)   sodium chloride 125 mL/hr Last Rate: 125 mL/hr (11/14/18 5237)     PRN Meds: diphenhydrAMINE    HYDROmorphone    influenza vaccine    LORazepam    magnesium hydroxide    nitroglycerin    ondansetron    pneumococcal 23-valent polysaccharide vaccine    Surgical procedures (if appropriate):  Procedure(s):  CYSTOSCOPY RETROGRADE PYELOGRAM WITH INSERTION STENT URETERAL

## 2018-11-14 NOTE — NURSING NOTE
No further issues since benadryl given, Toradol was d/c due to reaction  Stated he 'did not care for the Toradol anyway, the ER gave me dilaudid'  Request denied by [facility]  Only tylenol available for pt  Sleeping most of the night  Refused to ambulate  Refused heparin  Pt is resting comfortably in bed, NPO since midnight  Call bell is within reach

## 2018-11-14 NOTE — NURSING NOTE
Pt currently resting comfortably in bed  Toradol given per order at [facility] 227, approximately 2030 pt reported itching/hives  Karis VALDERRAMA notified, in to see pt  Benadryl ordered and given  Will monitor

## 2018-11-14 NOTE — ASSESSMENT & PLAN NOTE
The patient has generalized abdominal pain that he reports worse in the RUQ  CT and US negative for cholelithiasis or signs of cholecystitis  We will obtain HIDA to further investigate  Consult placed to urology for left sided ureterolithiasis  Continue supportive measures

## 2018-11-14 NOTE — PLAN OF CARE
Problem: DISCHARGE PLANNING - CARE MANAGEMENT  Goal: Discharge to post-acute care or home with appropriate resources  INTERVENTIONS:  - Conduct assessment to determine patient/family and health care team treatment goals, and need for post-acute services based on payer coverage, community resources, and patient preferences, and barriers to discharge  - Address psychosocial, clinical, and financial barriers to discharge as identified in assessment in conjunction with the patient/family and health care team  - Arrange appropriate level of post-acute services according to patient's   needs and preference and payer coverage in collaboration with the physician and health care team  - Communicate with and update the patient/family, physician, and health care team regarding progress on the discharge plan  - Arrange appropriate transportation to post-acute venues  - Patient's goal is to return home upon discharge   Outcome: Progressing

## 2018-11-14 NOTE — CONSULTS
Consultation - Urology   Cha Kaiser 46 y o  male MRN: [id number]  Unit/Bed#: -01 Encounter: [id number]      Assessment/Plan      Assessment:  Left renal colic secondary to 8 mm left ureteropelvic junction stone with very minimal hydronephrosis  Right lower chest and right upper quadrant abdominal pain currently undergoing rule out cholecystitis  Plan:  Await HIDA scan  Obtain abdominal x-ray  Bladder scan for postvoid residual will be obtained  Options, procedure, benefits and risks were discussed with the patient and we will plan cystoscopy with left retrograde urogram and insertion of left ureteral stent  Timing of this procedure depends upon whether or not he requires cholecystectomy at the same time  The patient understands that the stone will not be treated at this time and he will require a 2nd procedure, most likely outpatient shockwave lithotripsy  History of Present Illness   Attending: Haile Heard MD  Reason for Consult / Principal Problem:   Left renal stone  HPI: Cha Kaiser is a 46y o  year old male who presents with acute onset of right lower chest and right upper quadrant abdominal pain  However, he also relates a several week history of intermittent and intermittently severe left flank pain  He denies any associated nausea, vomiting, fever or chills  CT scan upon admission in the emergency room does show an 8 mm stone at the left ureteral pelvic junction  However, there is minimal dilation of the renal pelvis and minimal dilation of the lower pole calices  The patient has a long history of urinary frequency, nocturia, and decreased urinary flow  He has been on tamsulosin  He has never seen a urologist in the past   He denies any dysuria or hematuria  He states that he did have a left kidney stone discovered incidentally a couple of years ago and was told that it was very small  He has no other history of kidney stones    Urinalysis in the emergency room shows positive nitrite, but leukocytes are negative and there are minimal white blood cells and bacteria microscopically  Urine culture is pending  This could be a false-positive nitrite on urinalysis, as the patient has an otherwise benign urinalysis and there are no other symptoms of urinary tract infection  Consults    Review of Systems   Gastrointestinal: Positive for abdominal pain  Negative for abdominal distention  Genitourinary: Positive for difficulty urinating, flank pain and frequency  Negative for dysuria and hematuria         Historical Information   Past Medical History:   Diagnosis Date    Anxiety 5/2/2018    Arthritis     Chest pain     Last Assessed: 10/5/2016    Hypertension     Kidney stone     Obesity     Pleural effusion     Last Assessed: 9/15/2017    Pneumonia     Sciatica      Past Surgical History:   Procedure Laterality Date    CHEST TUBE INSERTION      Last Assessed: 9/15/2017    HERNIA REPAIR  2011    LUMBAR LAMINECTOMY  2009    TONSILLECTOMY      Last Assessed: 9/15/2017     Social History   History   Alcohol Use No     Comment: Former consumption of alcohol     History   Drug Use    Types: Marijuana     History   Smoking Status    Current Every Day Smoker    Packs/day: 0 50    Types: Cigarettes   Smokeless Tobacco    Never Used     Family History: non-contributory    Meds/Allergies   current meds:   Current Facility-Administered Medications   Medication Dose Route Frequency    cloNIDine (CATAPRES) tablet 0 3 mg  0 3 mg Oral BID    diphenhydrAMINE (BENADRYL) injection 50 mg  50 mg Intravenous Q6H PRN    DULoxetine (CYMBALTA) delayed release capsule 60 mg  60 mg Oral Daily    heparin (porcine) subcutaneous injection 5,000 Units  5,000 Units Subcutaneous Q8H [address]    influenza vaccine, recombinant, quadrivalent (FLUBLOK) IM injection 0 5 mL  0 5 mL Intramuscular Prior to discharge    lactated ringers infusion  150 mL/hr Intravenous Continuous    LORazepam (ATIVAN) 2 mg/mL injection 1 mg  1 mg Intravenous Q6H PRN    magnesium hydroxide (MILK OF MAGNESIA) 400 mg/5 mL oral suspension 30 mL  30 mL Oral Daily PRN    metoprolol tartrate (LOPRESSOR) tablet 50 mg  50 mg Oral BID    nicotine (NICODERM CQ) 21 mg/24 hr TD 24 hr patch 21 mg  21 mg Transdermal Daily    ondansetron (ZOFRAN) injection 4 mg  4 mg Intravenous Once PRN    ondansetron (ZOFRAN) injection 4 mg  4 mg Intravenous Q4H PRN    piperacillin-tazobactam (ZOSYN) 3 375 g in sodium chloride 0 9 % 50 mL IVPB  3 375 g Intravenous Q6H    pneumococcal 23-valent polysaccharide vaccine (PNEUMOVAX-23) injection 0 5 mL  0 5 mL Subcutaneous Prior to discharge    pregabalin (LYRICA) capsule 200 mg  200 mg Oral BID    sodium chloride 0 9 % infusion  125 mL/hr Intravenous Continuous    tamsulosin (FLOMAX) capsule 0 4 mg  0 4 mg Oral Daily     Allergies   Allergen Reactions    Codeine Itching    Suboxone [Buprenorphine Hcl-Naloxone Hcl]      Dug self apart    Toradol [Ketorolac Tromethamine]      Itching and hives       Objective   Vitals: Blood pressure 140/92, pulse 63, temperature 98 6 °F (37 °C), temperature source Tympanic, resp  rate 18, height 6' (1 829 m), weight 133 kg (292 lb 2 oz), SpO2 97 %  I/O last 24 hours: In: 3668 [P O :240; IV Piggyback:1050]  Out: -     Invasive Devices     Peripheral Intravenous Line            Peripheral IV 11/13/18 Left Antecubital less than 1 day                Physical Exam   Abdominal: He exhibits no distension  There is tenderness  Genitourinary: Penis normal     mild tenderness in the left upper quadrant and left flank  Normal testicles and spermatic cords bilaterally  Prostate 1+ enlarged firm smooth nontender without masses    Lab Results:   CBC:   Lab Results   Component Value Date    WBC 9 10 11/14/2018    HGB 13 5 (L) 11/14/2018    HCT 41 2 11/14/2018    MCV 93 11/14/2018     11/14/2018    MCH 30 5 11/14/2018    MCHC 32 9 11/14/2018    RDW 13 8 11/14/2018    MPV 8 6 11/14/2018    NRBC 0 11/14/2018     CMP:   Lab Results   Component Value Date    SODIUM 135 11/14/2018     11/14/2018    CO2 27 11/14/2018    BUN 16 11/14/2018    CREATININE 1 32 (H) 11/14/2018    CALCIUM 8 7 11/14/2018    AST 16 11/14/2018    ALT 19 11/14/2018    ALKPHOS 68 11/14/2018    EGFR 61 11/14/2018     Urinalysis:   Lab Results   Component Value Date    COLORU Melanie (A) 11/13/2018    CLARITYU Cloudy (A) 11/13/2018    SPECGRAV >=1 030 (H) 11/13/2018    PHUR 5 5 11/13/2018    LEUKOCYTESUR Negative 11/13/2018    NITRITE Positive (A) 11/13/2018    GLUCOSEU Negative 11/13/2018    KETONESU Negative 11/13/2018    BILIRUBINUR 1+ (A) 11/13/2018    BLOODU 3+ (A) 11/13/2018     Imaging Studies: I have personally reviewed pertinent reports  EKG, Pathology, and Other Studies: I have personally reviewed pertinent reports  VTE Prophylaxis: Sequential compression device Verónica Berry     Code Status: No Order  Advance Directive and Living Will:      Power of :    POLST:      Counseling / Coordination of Care  Total floor / unit time spent today 45 minutes  Greater than 50% of total time was spent with the patient and / or family counseling and / or coordination of care  A description of the counseling / coordination of care:   I have discussed the case with Dr Anastasiia Anton

## 2018-11-14 NOTE — ASSESSMENT & PLAN NOTE
· RUQ US: Diffuse hepatic steatosis   This is seen on the prior CT  Gallbladder findings suggesting adenomyomatosis   No cholelithiasis,  gallbladder wall thickening, or biliary dilatation is appreciated, however the sonographic Wilene Ree sign is reported positive   Correlate clinically  and HIDA scan could further evaluate as clinically indicated    · Abx per Sx  · HIDA scan in AM  · Clear liquid diet

## 2018-11-14 NOTE — ASSESSMENT & PLAN NOTE
· Called the patient room at 10:00 a m  · Patient states he has centralized chest pain that occasionally moves into his left shoulder, does not radiate into his jaw or throat or back  · His initial blood pressure was 188/98, he is currently NPO for a possible procedure and has not taken his blood pressure medications  · EKG was obtain shows normal sinus rhythm heart rate of 75  · Troponin and cmP were drawn  · Zofran was administered IV  · Patient will receive 0 5 mg IV Dilaudid  · Patient was given nitroglycerin sublingual x2 tablets  · Patient states his chest pain is improving    · Blood pressures are improving as well

## 2018-11-14 NOTE — ANESTHESIA PREPROCEDURE EVALUATION
Review of Systems/Medical History    Chart reviewed      Cardiovascular  Hypertension ,    Pulmonary  Pneumonia,        GI/Hepatic    Liver disease ,        Kidney stones,        Endo/Other     GYN       Hematology   Musculoskeletal    Arthritis     Neurology   Psychology   Anxiety,                   Anesthesia Plan  ASA Score- 3     Anesthesia Type- IV sedation with anesthesia and general with ASA Monitors  Additional Monitors:   Airway Plan:         Plan Factors-    Induction- intravenous  Postoperative Plan-     Informed Consent- Anesthetic plan and risks discussed with patient

## 2018-11-14 NOTE — UTILIZATION REVIEW
Initial Clinical Review    Admission: Date/Time/Statement: 11/13/18 @ [facility] This Encounter   Procedures    Inpatient Admission (expected length of stay for this patient is greater than two midnights)     Standing Status:   Standing     Number of Occurrences:   1     Order Specific Question:   Admitting Physician     Answer:   Konrad Waters [[id number]]     Order Specific Question:   Level of Care     Answer:   Med Surg [[id number]]     Order Specific Question:   Estimated length of stay     Answer:   Not Applicable     ED: Date/Time/Mode of Arrival:   ED Arrival Information     Expected Arrival Acuity Means of Arrival Escorted By Service Admission Type    - 11/13/2018 12:09 Emergent Walk-In Self Surgery-General Emergency    Arrival Complaint    flank pain        Chief Complaint:   Chief Complaint   Patient presents with    Abdominal Pain     right upper quadrant abdominal pain, began two weeks ago and is acutly worse from overnight  no N,V,D  however patient states he had the same pain around a year and a half ago and was gold he needed his gallbladder out  History of Illness:      Patient presents emergency room with complaint of abdominal right upper quadrant pain  He states the pain began about 2 weeks ago and waxed and waned until yesterday he stated the pain became more severe and has now been constant since last evening  Describes the pain as sharp stabbing he describes it worst 10/10  Denies objective fever but feels he has been warm  Denies chills denies nausea vomiting admits to diarrhea he states it is yellow  He states the diarrhea began yesterday  He has not been able to take anything that has helped over-the-counter  Denies any urinary complaints at this time admits to intermittent headache  Patient denies chest pain and shortness of breath  Had a previous surgical hernia umbilical repair years ago    He also states similar symptoms occur approximately 1 year ago was hospitalized and he states told he had stones in his gallbladder but never had his gallbladder taken out at that time  ED Vital Signs:   ED Triage Vitals   Temperature Pulse Respirations Blood Pressure SpO2   11/13/18 1215 11/13/18 1215 11/13/18 1215 11/13/18 1215 11/13/18 1215   97 5 °F (36 4 °C) (!) 129 22 (!) 243/122 98 %   Pain Score       11/13/18 1215       9        Wt Readings from Last 1 Encounters:   11/13/18 133 kg (292 lb 2 oz)       Vital Signs (abnormal):     11/13/18 1850   160/92    11/13/18 1530    165/109    11/13/18 1515    175/95    11/13/18 1430    179/107      Abnormal Labs/Diagnostic Test Results:     Sodium = 133, Glucose = 167, Lactic Acid = 3 2, WBC = 13 30, ANC = 10 10    CT abdomen and pelvis: There is a obstructive 7 mm calculi noted in the proximal left ureter  causing mild dilatation of the collecting system of the left side  US gallbladder: Gallbladder findings suggesting adenomyomatosis  No cholelithiasis,  gallbladder wall thickening, or biliary dilatation is appreciated, however the sonographic Saadia Grief sign is reported positive  Correlate clinically and HIDA scan could further evaluate as clinically indicated      ED Treatment:   Medication Administration from 11/13/2018 1209 to 11/13/2018 1849       Date/Time Order Dose Route Action Action by Comments     11/13/2018 1419 sodium chloride 0 9 % bolus 1,000 mL 0 mL Intravenous Stopped Juli Worley RN      11/13/2018 1248 sodium chloride 0 9 % bolus 1,000 mL 1,000 mL Intravenous New Bag Juli Worley RN      11/13/2018 1248 ondansetron (ZOFRAN) injection 4 mg 4 mg Intravenous Given Juli Worley RN      11/13/2018 1246 HYDROmorphone (DILAUDID) injection 1 mg 1 mg Intravenous Given Juli Worley RN      11/13/2018 1519 sodium chloride 0 9 % bolus 1,000 mL 0 mL Intravenous Stopped Juli Worley RN      11/13/2018 1419 sodium chloride 0 9 % bolus 1,000 mL 1,000 mL Intravenous New Bag Marisol STOUT Kathy, MARSHA      11/13/2018 1446 labetalol (NORMODYNE) injection 10 mg 10 mg Intravenous Given Rhodhiss La, RN      11/13/2018 1445 ketorolac (TORADOL) injection 60 mg 60 mg Intramuscular Given Rhodhiss Idol, RN      11/13/2018 1637 hydrALAZINE (APRESOLINE) injection 5 mg 5 mg Intravenous Given Rhodhiss La, RN      11/13/2018 1753 piperacillin-tazobactam (ZOSYN) 3 375 g in sodium chloride 0 9 % 50 mL IVPB 0 g Intravenous Stopped Hirame Glenda, RN      11/13/2018 1737 piperacillin-tazobactam (ZOSYN) 3 375 g in sodium chloride 0 9 % 50 mL IVPB 3 375 g Intravenous New Bag Elsy Tovar RN           Past Medical/Surgical History: Active Ambulatory Problems     Diagnosis Date Noted    Essential hypertension 10/05/2015    Benign prostate hyperplasia 01/15/2018    Degeneration of intervertebral disc of lumbosacral region 10/05/2015    Gallbladder polyp 09/15/2017    Generalized anxiety disorder 10/05/2015    Hepatic steatosis 09/15/2017    Nephrolithiasis 09/15/2017    Obesity 09/15/2017    Tobacco use disorder 09/15/2017    Sciatic nerve disease, left 05/24/2018    Drug dependence ([facility] [address] ) 05/24/2018    Encounter for monitoring Suboxone maintenance therapy 05/25/2018    Drug therapy continued 05/25/2018    History of prescription drug abuse 05/25/2018     Resolved Ambulatory Problems     Diagnosis Date Noted    No Resolved Ambulatory Problems     Past Medical History:   Diagnosis Date    Anxiety 5/2/2018    Arthritis     Chest pain     Hypertension     Kidney stone     Obesity     Pleural effusion     Pneumonia     Sciatica        Admitting Diagnosis: Essential hypertension [I10]  Abdominal pain [R10 9]  Renal calculus, left [N20 0]  Abdominal pain, acute, right upper quadrant [R10 11]    Age/Sex: 46 y o  male    Assessment/Plan:        HPI:  Khris Toro is a 46 y o  male who presents with 2 week history of generalized abdominal pain   He was previously told he had gallbladder polyps and should have his gallbladder removed, he presented due to concern that he needs it removed now  Associated subjective fever, nausea, and one episode of loose yellow stools  Pain poorly localized  Does describe RUQ pain, but also left back pain  Stable since admission  Had itching and hives today that he reports secondary to Toradol  Denies prior reaction or complications with NSAIDs at home       Left ureteral stone   Assessment & Plan     Noted on CT with signs of mild hydronephrosis  Urology consulted  Fluid resuscitation  Trend labs       Essential hypertension   Assessment & Plan     Elevated BP in the ER  Internal medicine consulted for medical management  Continue to trend vitals       * RUQ abdominal pain   Assessment & Plan     The patient has generalized abdominal pain that he reports worse in the RUQ  CT and US negative for cholelithiasis or signs of cholecystitis  We will obtain HIDA to further investigate  Consult placed to urology for left sided ureterolithiasis  Continue supportive measures  Admission Orders: Urology consult, Internal Medicine consult, NPO, chest x-ray, up with assistance      Scheduled Meds:   Current Facility-Administered Medications:  cloNIDine 0 3 mg Oral BID   diphenhydrAMINE 50 mg Intravenous Q6H PRN   DULoxetine 60 mg Oral Daily   heparin (porcine) 5,000 Units Subcutaneous Q8H [facility]   HYDROmorphone 0 5 mg Intravenous Q3H PRN   influenza vaccine 0 5 mL Intramuscular Prior to discharge   LORazepam 1 mg Intravenous Q6H PRN   magnesium hydroxide 30 mL Oral Daily PRN   metoprolol tartrate 50 mg Oral BID   nicotine 21 mg Transdermal Daily   nitroglycerin 0 4 mg Sublingual Q5 Min PRN   ondansetron 4 mg Intravenous Q4H PRN   piperacillin-tazobactam 3 375 g Intravenous Q6H   pneumococcal 23-valent polysaccharide vaccine 0 5 mL Subcutaneous Prior to discharge   pregabalin 200 mg Oral BID   tamsulosin 0 4 mg Oral Daily     Continuous Infusions:   lactated ringers 150 mL/hr Last Rate: 150 mL/hr (11/14/18 0406)   sodium chloride 125 mL/hr Last Rate: 125 mL/hr (11/14/18 0819)       ===========================================================  11/14 Urology Consult:    Assessment:    Left renal colic secondary to 8 mm left ureteropelvic junction stone with very minimal hydronephrosis  Right lower chest and right upper quadrant abdominal pain currently undergoing rule out cholecystitis  Plan:    Await HIDA scan  Obtain abdominal x-ray  Bladder scan for postvoid residual will be obtained  Options, procedure, benefits and risks were discussed with the patient and we will plan cystoscopy with left retrograde urogram and insertion of left ureteral stent  Timing of this procedure depends upon whether or not he requires cholecystectomy at the same time  The patient understands that the stone will not be treated at this time and he will require a 2nd procedure, most likely outpatient shockwave lithotripsy     ============================================================  11/14 Internal Medicine Consult:           * Other chest pain   Assessment & Plan     · Called the patient room at 10:00 a m  · Patient states he has centralized chest pain that occasionally moves into his left shoulder, does not radiate into his jaw or throat or back  · His initial blood pressure was 188/98, he is currently NPO for a possible procedure and has not taken his blood pressure medications  · EKG was obtain shows normal sinus rhythm heart rate of 75  · Troponin and cmP were drawn  · Zofran was administered IV  · Patient will receive 0 5 mg IV Dilaudid  · Patient was given nitroglycerin sublingual x2 tablets  · Patient states his chest pain is improving    · Blood pressures are improving as well

## 2018-11-14 NOTE — ASSESSMENT & PLAN NOTE
Elevated BP in the ER  Internal medicine consulted for medical management  Continue to trend vitals

## 2018-11-14 NOTE — ASSESSMENT & PLAN NOTE
· RUQ US: Diffuse hepatic steatosis   This is seen on the prior CT  Gallbladder findings suggesting adenomyomatosis   No cholelithiasis,  gallbladder wall thickening, or biliary dilatation is appreciated, however the sonographic Krystal Vic sign is reported positive   Correlate clinically  and HIDA scan could further evaluate as clinically indicated    · Abx per Sx  · HIDA scan in AM  · Abdominal KUB is ordered  · Chest x-ray has been ordered  · Bladder scan for PVR has been ordered  · Patient is NPO

## 2018-11-14 NOTE — CONSULTS
Consult- Mikal Kingfisher 1965, 46 y o  male MRN: [id number]    Unit/Bed#: -01 Encounter: [id number]    Primary Care Provider: Sandi Cuevas DO   Date and time admitted to hospital: 11/13/2018 12:13 PM      Inpatient consult to Internal Medicine  Consult performed by: Dionisio Marcial ordered by: Emmanuel May        Essential hypertension   Assessment & Plan    · BP mildly up, likely with pain  · Continue home meds and prn hydralazine     * RUQ abdominal pain   Assessment & Plan    · RUQ US: Diffuse hepatic steatosis   This is seen on the prior CT  Gallbladder findings suggesting adenomyomatosis   No cholelithiasis,  gallbladder wall thickening, or biliary dilatation is appreciated, however the sonographic Pallavi Joanie sign is reported positive   Correlate clinically  and HIDA scan could further evaluate as clinically indicated  · Abx per Sx  · HIDA scan in AM  · Clear liquid diet       Leukocytosis   Assessment & Plan    · Possibly secondary to RUQ US abnormality  · Continue abx and monitor     Obesity   Assessment & Plan    · BMI 39  · Weight loss recommended, follow up with PCP to consider dietician referral     Left ureteral stone   Assessment & Plan    · Urology to see in am  · Will make patient NPO  · CT abd/pelvis: There is a nonobstructive 7 mm calculi noted in the proximal left ureter causing mild dilatation of the collecting system of the left side        Benign prostate hyperplasia   Assessment & Plan    · Continue flomax     Tobacco use disorder   Assessment & Plan    · Nicotine patch     Allergic reaction caused by a drug   Assessment & Plan    · Possibly secondary to Toradol  · Patient reports itching, hives and sweats approximately 30 minutes after each injection    He did not tell the ER staff when it happened     Hepatic steatosis   Assessment & Plan    · Noted on CT     Generalized anxiety disorder   Assessment & Plan    · cymbalta       VTE Prophylaxis: Heparin Recommendations for Discharge:  · Per Primary Service    History of Present Illness:  Katia Benito is a 46 y o  male who is admitted to the surgery service due to RUQ pain and abnormal US  We are consulted for elevated blood pressure with known hypertension  Seen patient in room, nurse reported hives and he is itching after the Toradol injection  Notes symptoms down stairs after injection with hives and again approximately 30 minutes when on the floor after the injection, he reports feeling sweaty and hot  He never told ER staff about his symptoms  Patient has history of narcotic use secondary to back injury/surgery  He previous was on Suboxone and had allergic reaction from that  He is know planning to get on Medical Marijuana in the future  Review of Systems:  Review of Systems   Constitutional: Positive for fever  Negative for chills  HENT: Negative for trouble swallowing  Eyes: Negative for pain  Respiratory: Negative for shortness of breath and wheezing  Cardiovascular: Negative for chest pain and palpitations  Gastrointestinal: Positive for abdominal pain  Negative for diarrhea, nausea and vomiting  Genitourinary: Negative for dysuria  Musculoskeletal: Positive for back pain  Negative for neck pain  Skin:        Healed scars on skin  Hives on arms and legs   Neurological: Negative for dizziness, weakness and headaches  Psychiatric/Behavioral: The patient is nervous/anxious          Past Medical and Surgical History:   Past Medical History:   Diagnosis Date    Anxiety 5/2/2018    Arthritis     Chest pain     Last Assessed: 10/5/2016    Hypertension     Kidney stone     Obesity     Pleural effusion     Last Assessed: 9/15/2017    Pneumonia     Sciatica        Past Surgical History:   Procedure Laterality Date    CHEST TUBE INSERTION      Last Assessed: 9/15/2017    HERNIA REPAIR  2011    LUMBAR LAMINECTOMY  2009    TONSILLECTOMY      Last Assessed: 9/15/2017 Meds/Allergies:  all medications and allergies reviewed    Allergies: Allergies   Allergen Reactions    Codeine Itching    Suboxone [Buprenorphine Hcl-Naloxone Hcl]      Dug self apart    Toradol [Ketorolac Tromethamine]      Itching and hives       Social History:     Marital Status: Single    Substance Use History:   History   Alcohol Use No     Comment: Former consumption of alcohol     History   Smoking Status    Current Every Day Smoker    Packs/day: 0 50    Types: Cigarettes   Smokeless Tobacco    Never Used     History   Drug Use    Types: Marijuana       Family History:  I have reviewed the patients family history    Physical Exam:   Vitals:   Blood Pressure: 160/92 (11/13/18 1850)  Pulse: 90 (11/13/18 1850)  Temperature: 97 9 °F (36 6 °C) (11/13/18 1850)  Temp Source: Temporal (11/13/18 1850)  Respirations: 20 (11/13/18 1850)  Height: 6' (182 9 cm) (Stated) (11/13/18 1850)  Weight - Scale: 133 kg (292 lb 2 oz) (Standing scale) (11/13/18 1850)  SpO2: 98 % (11/13/18 1900)    Physical Exam   Constitutional: He is oriented to person, place, and time  He appears well-developed and well-nourished  Patient seen itching and anxious  HENT:   Head: Normocephalic and atraumatic  Eyes: Conjunctivae and EOM are normal  Right eye exhibits no discharge  Left eye exhibits no discharge  Neck: Normal range of motion  No tracheal deviation present  Cardiovascular: Normal rate, regular rhythm and normal heart sounds  Exam reveals no gallop and no friction rub  No murmur heard  Pulmonary/Chest: Effort normal and breath sounds normal  No respiratory distress  He has no wheezes  He has no rales  Abdominal: Soft  Bowel sounds are normal  He exhibits no distension  There is tenderness  There is no rebound  obese   Musculoskeletal: Normal range of motion  He exhibits no edema, tenderness or deformity  +Left CVA tenderness   Neurological: He is alert and oriented to person, place, and time  Skin: Skin is warm and dry  No rash noted  No erythema  No pallor  Scattered hives on hands and legs   Psychiatric: He has a normal mood and affect  His behavior is normal  Judgment and thought content normal    Nursing note and vitals reviewed  Additional Data:   Lab Results: I have personally reviewed pertinent reports  Results from last 7 days  Lab Units 11/13/18  1235   WBC Thousand/uL 13 30*   HEMOGLOBIN g/dL 16 7   HEMATOCRIT % 50 6*   PLATELETS Thousands/uL 289   NEUTROS PCT % 76*   LYMPHS PCT % 14*   MONOS PCT % 7   EOS PCT % 2       Results from last 7 days  Lab Units 11/13/18  1235   POTASSIUM mmol/L 3 9   CHLORIDE mmol/L 98   CO2 mmol/L 24   BUN mg/dL 13   CREATININE mg/dL 1 23   CALCIUM mg/dL 10 1   ALK PHOS U/L 94   ALT U/L 26   AST U/L 24       Results from last 7 days  Lab Units 11/13/18  1235   INR  1 09         No results found for: HGBA1C        Imaging: I have personally reviewed pertinent reports  US right upper quadrant   Final Result by Paola Gonzalez (11/13 5106)   Diffuse hepatic steatosis  This is seen on the prior CT  Gallbladder findings suggesting adenomyomatosis  No cholelithiasis,   gallbladder wall thickening, or biliary dilatation is appreciated, however   the sonographic Suri Bruins sign is reported positive  Correlate clinically   and HIDA scan could further evaluate as clinically indicated                 Signed by Mima Sanz MD      CT abdomen pelvis wo contrast   Final Result by Juan Rivera (11/13 9620)   There is a nonobstructive 7 mm calculi noted in the proximal left ureter   causing mild dilatation of the collecting system of the left side  These   findings are notified to JANNIE Hernández taking care of this patient   at 3:00 PM on 11/13/2018   Fatty liver  The appendix is within normal limits           Signed by Juan Rivera MD      NM inpatient order    (Results Pending)     ** Please Note: This note has been constructed using a voice recognition system   **

## 2018-11-14 NOTE — NURSING NOTE
Pt admitted from ER to room 120-1  Alert and orientated, pleasant and cooperative  Heart is regular, no edema present  Lungs are clear on room air, denies cough  Pain in RUQ, toradol given per order  Pt is on clear diet  Per pt will be getting stent tomorrow with dr Bennett Andrews  Currently pt is resting in bed, call bell within reach

## 2018-11-15 ENCOUNTER — APPOINTMENT (INPATIENT)
Dept: RADIOLOGY | Facility: HOSPITAL | Age: 53
DRG: 661 | End: 2018-11-15
Payer: MEDICARE

## 2018-11-15 ENCOUNTER — ANESTHESIA EVENT (INPATIENT)
Dept: PERIOP | Facility: HOSPITAL | Age: 53
DRG: 661 | End: 2018-11-15
Payer: MEDICARE

## 2018-11-15 ENCOUNTER — ANESTHESIA (INPATIENT)
Dept: PERIOP | Facility: HOSPITAL | Age: 53
DRG: 661 | End: 2018-11-15
Payer: MEDICARE

## 2018-11-15 PROBLEM — R10.84 GENERALIZED ABDOMINAL PAIN: Status: ACTIVE | Noted: 2018-11-13

## 2018-11-15 LAB
ALBUMIN SERPL BCP-MCNC: 3.5 G/DL (ref 3.5–5.7)
ALP SERPL-CCNC: 59 U/L (ref 40–150)
ALT SERPL W P-5'-P-CCNC: 17 U/L (ref 7–52)
ANION GAP SERPL CALCULATED.3IONS-SCNC: 5 MMOL/L (ref 4–13)
AST SERPL W P-5'-P-CCNC: 14 U/L (ref 13–39)
BACTERIA UR QL AUTO: ABNORMAL /HPF
BASOPHILS # BLD AUTO: 0.1 THOUSANDS/ΜL (ref 0–0.1)
BASOPHILS NFR BLD AUTO: 1 % (ref 0–2)
BILIRUB SERPL-MCNC: 1 MG/DL (ref 0.2–1)
BILIRUB UR QL STRIP: NEGATIVE
BUN SERPL-MCNC: 14 MG/DL (ref 7–25)
CALCIUM SERPL-MCNC: 8.6 MG/DL (ref 8.6–10.5)
CHLORIDE SERPL-SCNC: 106 MMOL/L (ref 98–107)
CLARITY UR: ABNORMAL
CO2 SERPL-SCNC: 27 MMOL/L (ref 21–31)
COLOR UR: ABNORMAL
CREAT SERPL-MCNC: 1.11 MG/DL (ref 0.7–1.3)
EOSINOPHIL # BLD AUTO: 0.4 THOUSAND/ΜL (ref 0–0.61)
EOSINOPHIL NFR BLD AUTO: 5 % (ref 0–5)
ERYTHROCYTE [DISTWIDTH] IN BLOOD BY AUTOMATED COUNT: 13.6 % (ref 11.5–14.5)
GFR SERPL CREATININE-BSD FRML MDRD: 75 ML/MIN/1.73SQ M
GLUCOSE SERPL-MCNC: 92 MG/DL (ref 65–99)
GLUCOSE UR STRIP-MCNC: NEGATIVE MG/DL
HCT VFR BLD AUTO: 39.3 % (ref 36.5–49.3)
HGB BLD-MCNC: 13 G/DL (ref 14–18)
HGB UR QL STRIP.AUTO: ABNORMAL
KETONES UR STRIP-MCNC: NEGATIVE MG/DL
LEUKOCYTE ESTERASE UR QL STRIP: NEGATIVE
LYMPHOCYTES # BLD AUTO: 1.9 THOUSANDS/ΜL (ref 0.6–4.47)
LYMPHOCYTES NFR BLD AUTO: 23 % (ref 21–51)
MCH RBC QN AUTO: 30.7 PG (ref 26–34)
MCHC RBC AUTO-ENTMCNC: 33.2 G/DL (ref 31–37)
MCV RBC AUTO: 93 FL (ref 81–99)
MONOCYTES # BLD AUTO: 0.7 THOUSAND/ΜL (ref 0.17–1.22)
MONOCYTES NFR BLD AUTO: 9 % (ref 2–12)
NEUTROPHILS # BLD AUTO: 5.3 THOUSANDS/ΜL (ref 1.4–6.5)
NEUTS SEG NFR BLD AUTO: 63 % (ref 42–75)
NITRITE UR QL STRIP: NEGATIVE
NON-SQ EPI CELLS URNS QL MICRO: ABNORMAL /HPF
NRBC BLD AUTO-RTO: 0 /100 WBCS
PH UR STRIP.AUTO: 6.5 [PH] (ref 5–8)
PLATELET # BLD AUTO: 178 THOUSANDS/UL (ref 149–390)
PMV BLD AUTO: 8.4 FL (ref 8.6–11.7)
POTASSIUM SERPL-SCNC: 3.7 MMOL/L (ref 3.5–5.5)
PROT SERPL-MCNC: 6 G/DL (ref 6.4–8.9)
PROT UR STRIP-MCNC: NEGATIVE MG/DL
RBC # BLD AUTO: 4.24 MILLION/UL (ref 4.3–5.9)
RBC #/AREA URNS AUTO: ABNORMAL /HPF
SODIUM SERPL-SCNC: 138 MMOL/L (ref 134–143)
SP GR UR STRIP.AUTO: 1.01 (ref 1–1.03)
UROBILINOGEN UR QL STRIP.AUTO: 0.2 E.U./DL
WBC # BLD AUTO: 8.4 THOUSAND/UL (ref 4.8–10.8)
WBC #/AREA URNS AUTO: ABNORMAL /HPF

## 2018-11-15 PROCEDURE — 81001 URINALYSIS AUTO W/SCOPE: CPT | Performed by: UROLOGY

## 2018-11-15 PROCEDURE — 74430 CONTRAST X-RAY BLADDER: CPT

## 2018-11-15 PROCEDURE — 81003 URINALYSIS AUTO W/O SCOPE: CPT | Performed by: UROLOGY

## 2018-11-15 PROCEDURE — C1769 GUIDE WIRE: HCPCS | Performed by: UROLOGY

## 2018-11-15 PROCEDURE — 85025 COMPLETE CBC W/AUTO DIFF WBC: CPT | Performed by: PHYSICIAN ASSISTANT

## 2018-11-15 PROCEDURE — 99232 SBSQ HOSP IP/OBS MODERATE 35: CPT | Performed by: PHYSICIAN ASSISTANT

## 2018-11-15 PROCEDURE — 80053 COMPREHEN METABOLIC PANEL: CPT | Performed by: PHYSICIAN ASSISTANT

## 2018-11-15 PROCEDURE — 0T778DZ DILATION OF LEFT URETER WITH INTRALUMINAL DEVICE, VIA NATURAL OR ARTIFICIAL OPENING ENDOSCOPIC: ICD-10-PCS | Performed by: UROLOGY

## 2018-11-15 PROCEDURE — C2617 STENT, NON-COR, TEM W/O DEL: HCPCS | Performed by: UROLOGY

## 2018-11-15 PROCEDURE — 94760 N-INVAS EAR/PLS OXIMETRY 1: CPT

## 2018-11-15 PROCEDURE — BT1F1ZZ FLUOROSCOPY OF LEFT KIDNEY, URETER AND BLADDER USING LOW OSMOLAR CONTRAST: ICD-10-PCS | Performed by: UROLOGY

## 2018-11-15 PROCEDURE — 87086 URINE CULTURE/COLONY COUNT: CPT | Performed by: UROLOGY

## 2018-11-15 DEVICE — STENT URETERAL 6 FR 26CM INLAY OPTIMA: Type: IMPLANTABLE DEVICE | Site: URETER | Status: FUNCTIONAL

## 2018-11-15 RX ORDER — FENTANYL CITRATE/PF 50 MCG/ML
25 SYRINGE (ML) INJECTION
Status: DISCONTINUED | OUTPATIENT
Start: 2018-11-15 | End: 2018-11-15 | Stop reason: HOSPADM

## 2018-11-15 RX ORDER — OXYCODONE HYDROCHLORIDE AND ACETAMINOPHEN 5; 325 MG/1; MG/1
2 TABLET ORAL EVERY 4 HOURS PRN
Status: DISCONTINUED | OUTPATIENT
Start: 2018-11-15 | End: 2018-11-17 | Stop reason: HOSPADM

## 2018-11-15 RX ORDER — LIDOCAINE HYDROCHLORIDE 20 MG/ML
JELLY TOPICAL AS NEEDED
Status: DISCONTINUED | OUTPATIENT
Start: 2018-11-15 | End: 2018-11-15 | Stop reason: HOSPADM

## 2018-11-15 RX ORDER — TAMSULOSIN HYDROCHLORIDE 0.4 MG/1
0.8 CAPSULE ORAL DAILY
Status: DISCONTINUED | OUTPATIENT
Start: 2018-11-16 | End: 2018-11-17 | Stop reason: HOSPADM

## 2018-11-15 RX ORDER — LIDOCAINE HYDROCHLORIDE 20 MG/ML
INJECTION, SOLUTION EPIDURAL; INFILTRATION; INTRACAUDAL; PERINEURAL AS NEEDED
Status: DISCONTINUED | OUTPATIENT
Start: 2018-11-15 | End: 2018-11-15 | Stop reason: SURG

## 2018-11-15 RX ORDER — MIDAZOLAM HYDROCHLORIDE 1 MG/ML
INJECTION INTRAMUSCULAR; INTRAVENOUS AS NEEDED
Status: DISCONTINUED | OUTPATIENT
Start: 2018-11-15 | End: 2018-11-15 | Stop reason: SURG

## 2018-11-15 RX ORDER — PROPOFOL 10 MG/ML
INJECTION, EMULSION INTRAVENOUS AS NEEDED
Status: DISCONTINUED | OUTPATIENT
Start: 2018-11-15 | End: 2018-11-15 | Stop reason: SURG

## 2018-11-15 RX ORDER — ONDANSETRON 2 MG/ML
INJECTION INTRAMUSCULAR; INTRAVENOUS AS NEEDED
Status: DISCONTINUED | OUTPATIENT
Start: 2018-11-15 | End: 2018-11-15 | Stop reason: SURG

## 2018-11-15 RX ORDER — MAGNESIUM HYDROXIDE 1200 MG/15ML
LIQUID ORAL AS NEEDED
Status: DISCONTINUED | OUTPATIENT
Start: 2018-11-15 | End: 2018-11-15 | Stop reason: HOSPADM

## 2018-11-15 RX ORDER — ALBUTEROL SULFATE 2.5 MG/3ML
2.5 SOLUTION RESPIRATORY (INHALATION) EVERY 6 HOURS PRN
Status: DISCONTINUED | OUTPATIENT
Start: 2018-11-15 | End: 2018-11-17 | Stop reason: HOSPADM

## 2018-11-15 RX ORDER — FENTANYL CITRATE 50 UG/ML
INJECTION, SOLUTION INTRAMUSCULAR; INTRAVENOUS AS NEEDED
Status: DISCONTINUED | OUTPATIENT
Start: 2018-11-15 | End: 2018-11-15 | Stop reason: SURG

## 2018-11-15 RX ORDER — ATROPA BELLADONNA AND OPIUM 16.2; 6 MG/1; MG/1
1 SUPPOSITORY RECTAL EVERY 6 HOURS PRN
Status: DISCONTINUED | OUTPATIENT
Start: 2018-11-15 | End: 2018-11-15

## 2018-11-15 RX ORDER — LEVOFLOXACIN 500 MG/1
500 TABLET, FILM COATED ORAL EVERY 24 HOURS
Status: DISCONTINUED | OUTPATIENT
Start: 2018-11-15 | End: 2018-11-15

## 2018-11-15 RX ORDER — ATROPA BELLADONNA AND OPIUM 16.2; 6 MG/1; MG/1
1 SUPPOSITORY RECTAL ONCE
Status: COMPLETED | OUTPATIENT
Start: 2018-11-15 | End: 2018-11-15

## 2018-11-15 RX ORDER — LEVOFLOXACIN 750 MG/1
750 TABLET ORAL EVERY 24 HOURS
Status: DISCONTINUED | OUTPATIENT
Start: 2018-11-16 | End: 2018-11-17 | Stop reason: HOSPADM

## 2018-11-15 RX ADMIN — FENTANYL CITRATE 50 MCG: 50 INJECTION INTRAMUSCULAR; INTRAVENOUS at 07:15

## 2018-11-15 RX ADMIN — OXYCODONE HYDROCHLORIDE AND ACETAMINOPHEN 2 TABLET: 5; 325 TABLET ORAL at 18:53

## 2018-11-15 RX ADMIN — SODIUM CHLORIDE 125 ML/HR: 9 INJECTION, SOLUTION INTRAVENOUS at 17:59

## 2018-11-15 RX ADMIN — SODIUM CHLORIDE 125 ML/HR: 9 INJECTION, SOLUTION INTRAVENOUS at 02:42

## 2018-11-15 RX ADMIN — PIPERACILLIN SODIUM AND TAZOBACTAM SODIUM 3.38 G: 3; .375 INJECTION, POWDER, LYOPHILIZED, FOR SOLUTION INTRAVENOUS at 05:50

## 2018-11-15 RX ADMIN — HYDROMORPHONE HYDROCHLORIDE 0.5 MG: 1 INJECTION, SOLUTION INTRAMUSCULAR; INTRAVENOUS; SUBCUTANEOUS at 18:53

## 2018-11-15 RX ADMIN — METOPROLOL TARTRATE 50 MG: 50 TABLET ORAL at 11:27

## 2018-11-15 RX ADMIN — PROPOFOL 100 MG: 10 INJECTION, EMULSION INTRAVENOUS at 07:15

## 2018-11-15 RX ADMIN — CLONIDINE HYDROCHLORIDE 0.3 MG: 0.1 TABLET ORAL at 17:18

## 2018-11-15 RX ADMIN — METOPROLOL TARTRATE 50 MG: 50 TABLET ORAL at 17:18

## 2018-11-15 RX ADMIN — MIDAZOLAM HYDROCHLORIDE 2 MG: 1 INJECTION, SOLUTION INTRAMUSCULAR; INTRAVENOUS at 07:10

## 2018-11-15 RX ADMIN — SODIUM CHLORIDE, POTASSIUM CHLORIDE, SODIUM LACTATE AND CALCIUM CHLORIDE: 600; 310; 30; 20 INJECTION, SOLUTION INTRAVENOUS at 07:12

## 2018-11-15 RX ADMIN — OXYCODONE HYDROCHLORIDE AND ACETAMINOPHEN 2 TABLET: 5; 325 TABLET ORAL at 14:50

## 2018-11-15 RX ADMIN — HYDROMORPHONE HYDROCHLORIDE 0.5 MG: 1 INJECTION, SOLUTION INTRAMUSCULAR; INTRAVENOUS; SUBCUTANEOUS at 22:48

## 2018-11-15 RX ADMIN — PROPOFOL 40 MG: 10 INJECTION, EMULSION INTRAVENOUS at 07:30

## 2018-11-15 RX ADMIN — DEXAMETHASONE SODIUM PHOSPHATE 8 MG: 10 INJECTION INTRAMUSCULAR; INTRAVENOUS at 08:10

## 2018-11-15 RX ADMIN — PROPOFOL 100 MG: 10 INJECTION, EMULSION INTRAVENOUS at 07:25

## 2018-11-15 RX ADMIN — LIDOCAINE HYDROCHLORIDE 100 MG: 20 INJECTION, SOLUTION EPIDURAL; INFILTRATION; INTRACAUDAL; PERINEURAL at 07:15

## 2018-11-15 RX ADMIN — TAMSULOSIN HYDROCHLORIDE 0.4 MG: 0.4 CAPSULE ORAL at 11:29

## 2018-11-15 RX ADMIN — ONDANSETRON HYDROCHLORIDE 4 MG: 2 INJECTION, SOLUTION INTRAVENOUS at 08:10

## 2018-11-15 RX ADMIN — PREGABALIN 200 MG: 100 CAPSULE ORAL at 17:18

## 2018-11-15 RX ADMIN — ATROPA BELLADONNA AND OPIUM 1 SUPPOSITORY: 16.2; 6 SUPPOSITORY RECTAL at 12:34

## 2018-11-15 RX ADMIN — CLONIDINE HYDROCHLORIDE 0.3 MG: 0.1 TABLET ORAL at 11:26

## 2018-11-15 RX ADMIN — PROPOFOL 50 MG: 10 INJECTION, EMULSION INTRAVENOUS at 07:35

## 2018-11-15 RX ADMIN — HYDROMORPHONE HYDROCHLORIDE 0.5 MG: 1 INJECTION, SOLUTION INTRAMUSCULAR; INTRAVENOUS; SUBCUTANEOUS at 12:55

## 2018-11-15 RX ADMIN — DULOXETINE HYDROCHLORIDE 60 MG: 60 CAPSULE, DELAYED RELEASE ORAL at 11:26

## 2018-11-15 RX ADMIN — PROPOFOL 170 MG: 10 INJECTION, EMULSION INTRAVENOUS at 07:40

## 2018-11-15 RX ADMIN — HYDROMORPHONE HYDROCHLORIDE 0.5 MG: 1 INJECTION, SOLUTION INTRAMUSCULAR; INTRAVENOUS; SUBCUTANEOUS at 15:58

## 2018-11-15 RX ADMIN — PREGABALIN 200 MG: 100 CAPSULE ORAL at 11:29

## 2018-11-15 RX ADMIN — HYDROMORPHONE HYDROCHLORIDE 0.5 MG: 1 INJECTION, SOLUTION INTRAMUSCULAR; INTRAVENOUS; SUBCUTANEOUS at 10:04

## 2018-11-15 RX ADMIN — OXYCODONE HYDROCHLORIDE AND ACETAMINOPHEN 2 TABLET: 5; 325 TABLET ORAL at 22:48

## 2018-11-15 RX ADMIN — PROPOFOL 20 MG: 10 INJECTION, EMULSION INTRAVENOUS at 07:20

## 2018-11-15 NOTE — PROGRESS NOTES
Progress Note - Ton Dowd 1965, 46 y o  male MRN: [id number]    Unit/Bed#: -01 Encounter: [id number]    Primary Care Provider: Izzy Gomez DO   Date and time admitted to hospital: 11/13/2018 12:13 PM        Left ureteral stone   Assessment & Plan    Status post cystoscopy and left ureteral stenting by urology this AM  Blood tinged urine per Ko  Ko management per urology  Generalized abdominal pain   Assessment & Plan    The patient's generalized abdominal pain is now localizing to the suprapubic region and left flank after cystoscopy with stenting of the left ureter  No specific RUQ pain or tenderness to suggest cholecystitis  We will advance his diet  Surgery to follow up with outpatient HIDA scan after discharge  Urology and internal medicine following  Continue supportive measures  * Other chest pain   Assessment & Plan    Cardiac work-up per internal medicine has been negative  No recurrent chest pain, discomfort, or dyspnea overnight  Continue to monitor  Subjective/Objective   Chief Complaint: "I'm tired, ready for a nap"    Subjective: Patient tired after urology procedure this morning  Has suprapubic and left flank pain controlled with medictaion  No nausea or vomiting  No specific RUQ pain  No loose stools today  No dyspnea or chest pain today  No new symptoms  Objective:     Blood pressure 157/84, pulse 56, temperature (!) 97 3 °F (36 3 °C), resp  rate 16, height 6' (1 829 m), weight 133 kg (292 lb 2 oz), SpO2 95 %  ,Body mass index is 39 62 kg/m²  Intake/Output Summary (Last 24 hours) at 11/15/18 1140  Last data filed at 11/15/18 4121   Gross per 24 hour   Intake             1450 ml   Output             1800 ml   Net             -350 ml       Invasive Devices     Peripheral Intravenous Line            Peripheral IV 11/13/18 Left Antecubital 1 day                Physical Exam   Constitutional: He is oriented to person, place, and time   He appears well-developed and well-nourished  No distress  HENT:   Head: Normocephalic and atraumatic  Eyes: Pupils are equal, round, and reactive to light  EOM are normal    Neck: Normal range of motion  Cardiovascular: Normal rate and regular rhythm  Murmur heard  Pulmonary/Chest: Effort normal and breath sounds normal  No respiratory distress  Abdominal: Soft  Normal appearance and bowel sounds are normal  He exhibits no distension and no mass  There is tenderness in the suprapubic area and left lower quadrant  There is no rebound and negative Richey's sign  No hernia  Mild generalized tenderness  Worse in suprapubic region, LLQ, and left flank  Negative Richey's sign  Musculoskeletal: Normal range of motion  Neurological: He is alert and oriented to person, place, and time  Skin: Skin is warm and dry  Capillary refill takes less than 2 seconds  No rash noted  He is not diaphoretic  Psychiatric: He has a normal mood and affect  His behavior is normal        Lab, Imaging and other studies:  I have personally reviewed pertinent lab results    , CBC:   Lab Results   Component Value Date    WBC 8 40 11/15/2018    HGB 13 0 (L) 11/15/2018    HCT 39 3 11/15/2018    MCV 93 11/15/2018     11/15/2018    MCH 30 7 11/15/2018    MCHC 33 2 11/15/2018    RDW 13 6 11/15/2018    MPV 8 4 (L) 11/15/2018    NRBC 0 11/15/2018   , CMP:   Lab Results   Component Value Date    SODIUM 138 11/15/2018    K 3 7 11/15/2018     11/15/2018    CO2 27 11/15/2018    BUN 14 11/15/2018    CREATININE 1 11 11/15/2018    CALCIUM 8 6 11/15/2018    AST 14 11/15/2018    ALT 17 11/15/2018    ALKPHOS 59 11/15/2018    EGFR 75 11/15/2018     VTE Pharmacologic Prophylaxis: Heparin  VTE Mechanical Prophylaxis: sequential compression device

## 2018-11-15 NOTE — ANESTHESIA POSTPROCEDURE EVALUATION
Post-Op Assessment Note      CV Status:  Stable    Mental Status:  Alert    Hydration Status:  Stable    PONV Controlled:  Controlled    Airway Patency:  Patent    Post Op Vitals Reviewed: Yes          Staff: Anesthesiologist       Comments: Patient needed conversion to GA LMA secondary to lentgh of procedure            /78 (11/15/18 0900)    Temp (!) 97 3 °F (36 3 °C) (11/15/18 0900)    Pulse 69 (11/15/18 0900)   Resp (!) 24 (11/15/18 0900)    SpO2 99 % (11/15/18 0900)

## 2018-11-15 NOTE — RESPIRATORY THERAPY NOTE
RT Protocol Note  Andres Mondragon 46 y o  male MRN: [id number]  Unit/Bed#: -01 Encounter: [id number]    Assessment    Principal Problem:    Other chest pain  Active Problems:    Essential hypertension    Benign prostate hyperplasia    Generalized anxiety disorder    Hepatic steatosis    Obesity    Tobacco use disorder    Generalized abdominal pain    Left ureteral stone    Leukocytosis    Allergic reaction caused by a drug      Home Pulmonary Medications:  None    Home Devices/Therapy: (P) Other (Comment) (None)    Past Medical History:   Diagnosis Date    Anxiety 5/2/2018    Arthritis     Chest pain     Last Assessed: 10/5/2016    Hypertension     Kidney stone     Obesity     Pleural effusion     Last Assessed: 9/15/2017    Pneumonia     Sciatica      Social History     Social History    Marital status: Single     Spouse name: N/A    Number of children: 1    Years of education: N/A     Social History Main Topics    Smoking status: Current Every Day Smoker     Packs/day: 0 50     Types: Cigarettes    Smokeless tobacco: Never Used    Alcohol use No      Comment: Former consumption of alcohol    Drug use: Yes     Types: Marijuana    Sexual activity: Not Asked     Other Topics Concern    None     Social History Narrative    Caffeine use    Chronic Narcotic use    Lives with adult children    On disability    Denied: seeing a dentist           Subjective         Objective    Physical Exam:   Assessment Type: (P) Assess only  General Appearance: (P) Alert, Awake  Respiratory Pattern: (P) Normal  Chest Assessment: (P) Chest expansion symmetrical  Bilateral Breath Sounds: (P) Clear  Cough: (P) None  O2 Device: (P) Room Air    Vitals:  Blood pressure 152/74, pulse (P) 64, temperature (!) 97 3 °F (36 3 °C), temperature source Tympanic, resp  rate (P) 16, height 6' (1 829 m), weight 133 kg (292 lb 2 oz), SpO2 95 %  Imaging and other studies: I have personally reviewed pertinent reports        O2 Device: (P) Room Air     Plan    Respiratory Plan: (P) No distress/Pulmonary history        Resp Comments: (P) Pt  assessed  Pt  denies SOB, respiratory distress or the need for a nebulizer treatment or an MDI @ this time  Pt  is using his IS Q1 hour w/a x 10 breaths on his own  Pt  was instructed on the use of his IS, by the RN

## 2018-11-15 NOTE — ASSESSMENT & PLAN NOTE
Cardiac work-up per internal medicine has been negative  No recurrent chest pain, discomfort, or dyspnea overnight  Continue to monitor

## 2018-11-15 NOTE — OP NOTE
OPERATIVE REPORT  PATIENT NAME: Ce Taylor    :  1965  MRN: [id number]  Pt Location: [facility] OR ROOM 01    SURGERY DATE: 11/15/2018    Surgeon(s) and Role:     Criselda Cortes MD - Primary    Preop Diagnosis:  * No pre-op diagnosis entered *    * No Diagnosis Codes entered *    Procedure(s) (LRB):  CYSTOSCOPY RETROGRADE PYELOGRAM WITH INSERTION STENT URETERAL (Left)    Specimen(s):  ID Type Source Tests Collected by Time Destination   A : Urine C&S via cystoscope Urine Urinary Bladder URINE CULTURE, URINALYSIS WITH REFLEX TO MICROSCOPIC Dirk Cummings MD 11/15/2018 0734    B : URINE CULTURE LEFT KIDNEY Urine Kidney, Left URINE CULTURE Dirk Cummings MD 11/15/2018 1018        Estimated Blood Loss:   Minimal    Drains:       Anesthesia Type:   Choice    Operative Indications:  * No pre-op diagnosis entered *  The patient presented with left renal colic secondary to an 8 mm left ureteropelvic junction stone  Options, procedure, benefits and risks were discussed the procedure  He understands that definitive treatment of the stone will be deferred until a later date  Operative Findings:  Faint calcification overlying the left kidney  Minimal dilation of the collecting system  Complications:   None    Procedure and Technique:  The patient was properly identified in the operating room and after adequate intravenous sedation was placed in the lithotomy position  The lower abdomen and genitalia were prepped and draped in the usual fashion  2% lidocaine jelly was instilled per urethra  Cystourethroscopy was performed with a 22 Malay cystoscope using 30 degree and 70 degree lenses  The anterior urethra was normal   The prostate had mild bilateral lateral lobe enlargement, but there was a significantly elevated median lobe which was partially intravesical   The ureteral orifices were close to the bladder neck    The left ureteral orifice was just behind the median lobe making visualization and cannulation of the orifice quite difficult  The bladder had mild trabeculation with no masses or calcifications  At 1st, there appear to be some degree of high-pressure within the bladder and he actually had bladder spasms during the procedure  He therefore was converted to general anesthesia with an LMA then distended better and visibility improved  However, there was mild bleeding throughout the procedure from the prostate which did inhibit optimal visualization of the ureteral orifices  In addition, due to the median lobe, the angle of the left ureteral orifice was quite acute and it was difficult to cannulate  An 8 Indian cone tip ureteral catheter was placed against the left ureteral orifice and contrast material was instilled under fluoroscopic guidance  The distal ureter did opacify and was normal caliber without filling defect  There was some J hooking  A 70 degree lens with a deflecting bridge was necessary to place the catheter against the orifice  A 5 Indian open-ended catheter ultimately was able to be placed at the left ureteral orifice and a solo flex guidewire was then passed up the ureter  However, due to the anatomical obstacles, it took more than 1 hr before the ureter could be cannulated after multiple attempts were made  The guidewire was then passed into the collecting system and the ureteral catheter followed  The guidewire was removed leaving the ureteral catheter in place  Urine was drained from the kidney and sent for culture  The wire was replaced into the upper pole and the catheter was removed  A 6 Indian 26 cm Bard inlay optima stent was passed over the wire and the wire was removed leaving a good curl of stent within the renal pelvis and within the bladder  The cystoscope was removed  A 20 Indian Ko catheter was placed  Clear drainage was obtained  The patient tolerated the procedure well and left the operating room in good condition     I was present for the entire procedure    Patient Disposition:  PACU     SIGNATURE: Kamila Brooke MD  DATE: November 15, 2018  TIME: 1:12 PM

## 2018-11-15 NOTE — ANESTHESIA PREPROCEDURE EVALUATION
Review of Systems/Medical History    Chart reviewed      Cardiovascular  Hypertension ,    Pulmonary  Pneumonia,        GI/Hepatic    Liver disease ,        Kidney stones,        Endo/Other     GYN       Hematology   Musculoskeletal    Arthritis     Neurology   Psychology   Anxiety,              Physical Exam    Airway    Mallampati score: II         Dental       Cardiovascular  Rhythm: regular, Rate: normal,     Pulmonary  Breath sounds clear to auscultation,     Other Findings        Anesthesia Plan  ASA Score- 3     Anesthesia Type- IV sedation with anesthesia with ASA Monitors  Additional Monitors:   Airway Plan:     Comment: Possible general LMA  Plan Factors-    Induction- intravenous  Postoperative Plan- Plan for postoperative opioid use  Planned trial extubation    Informed Consent- Anesthetic plan and risks discussed with patient

## 2018-11-15 NOTE — PROGRESS NOTES
Pt returned to room 120A awake, alert and oriented post stent insertion by Dr Manas Hernandez  C/o 8/10 lower abd "bladder" pain  Dr Misael Love reviewed orders  NS initiated @ 125cc's/hr  20F fritz draining blood tinged urine- clearer in upper tubing  Order to d/c in am @ 0600 noted  Clear liquids po tolerated

## 2018-11-15 NOTE — ASSESSMENT & PLAN NOTE
The patient's generalized abdominal pain is now localizing to the suprapubic region and left flank after cystoscopy with stenting of the left ureter  No specific RUQ pain or tenderness to suggest cholecystitis  We will advance his diet  Surgery to follow up with outpatient HIDA scan after discharge  Urology and internal medicine following  Continue supportive measures

## 2018-11-15 NOTE — INTERIM OP NOTE
CYSTOSCOPY RETROGRADE PYELOGRAM WITH INSERTION STENT URETERAL  Postoperative Note  PATIENT NAME: Dorina Snellen  : 1965  MRN: [id number]  [facility] OR ROOM 01    Surgery Date: 11/15/2018    Preop Diagnosis:  * No pre-op diagnosis entered *    * No Diagnosis Codes entered *    Procedure(s) (LRB):  CYSTOSCOPY RETROGRADE PYELOGRAM WITH INSERTION STENT URETERAL (Left)    Surgeon(s) and Role:     * Tamy Jo MD - Primary    Specimens:  ID Type Source Tests Collected by Time Destination   A : Urine C&S via cystoscope Urine Urinary Bladder URINE CULTURE, URINALYSIS WITH REFLEX TO MICROSCOPIC Tamy Jo MD 11/15/2018 0734    B : URINE CULTURE LEFT KIDNEY Urine Kidney, Left URINE CULTURE Tamy Jo MD 11/15/2018 0169        Estimated Blood Loss:   Minimal    Anesthesia Type:   Choice     Findings:    Left UPJ stone    Intravesical median lobe  Complications:   None    SIGNATURE: Tamy Jo MD   DATE: November 15, 2018   TIME: 8:51 AM

## 2018-11-15 NOTE — ASSESSMENT & PLAN NOTE
Status post cystoscopy and left ureteral stenting by urology this AM  Blood tinged urine per Ko  Ko management per urology

## 2018-11-16 ENCOUNTER — APPOINTMENT (INPATIENT)
Dept: RADIOLOGY | Facility: HOSPITAL | Age: 53
DRG: 661 | End: 2018-11-16
Payer: MEDICARE

## 2018-11-16 PROCEDURE — 74018 RADEX ABDOMEN 1 VIEW: CPT

## 2018-11-16 PROCEDURE — 99232 SBSQ HOSP IP/OBS MODERATE 35: CPT | Performed by: SURGERY

## 2018-11-16 PROCEDURE — 94760 N-INVAS EAR/PLS OXIMETRY 1: CPT

## 2018-11-16 RX ORDER — NICOTINE 21 MG/24HR
1 PATCH, TRANSDERMAL 24 HOURS TRANSDERMAL DAILY
Qty: 28 PATCH | Refills: 0 | Status: SHIPPED | OUTPATIENT
Start: 2018-11-17 | End: 2019-01-15

## 2018-11-16 RX ADMIN — HYDROMORPHONE HYDROCHLORIDE 0.5 MG: 1 INJECTION, SOLUTION INTRAMUSCULAR; INTRAVENOUS; SUBCUTANEOUS at 17:23

## 2018-11-16 RX ADMIN — HYDROMORPHONE HYDROCHLORIDE 0.5 MG: 1 INJECTION, SOLUTION INTRAMUSCULAR; INTRAVENOUS; SUBCUTANEOUS at 02:56

## 2018-11-16 RX ADMIN — SODIUM CHLORIDE 125 ML/HR: 9 INJECTION, SOLUTION INTRAVENOUS at 17:25

## 2018-11-16 RX ADMIN — METOPROLOL TARTRATE 50 MG: 50 TABLET ORAL at 17:23

## 2018-11-16 RX ADMIN — OXYCODONE HYDROCHLORIDE AND ACETAMINOPHEN 2 TABLET: 5; 325 TABLET ORAL at 09:00

## 2018-11-16 RX ADMIN — TAMSULOSIN HYDROCHLORIDE 0.8 MG: 0.4 CAPSULE ORAL at 09:00

## 2018-11-16 RX ADMIN — PREGABALIN 200 MG: 100 CAPSULE ORAL at 17:24

## 2018-11-16 RX ADMIN — LEVOFLOXACIN 750 MG: 750 TABLET, FILM COATED ORAL at 13:15

## 2018-11-16 RX ADMIN — CLONIDINE HYDROCHLORIDE 0.3 MG: 0.1 TABLET ORAL at 17:23

## 2018-11-16 RX ADMIN — HYDROMORPHONE HYDROCHLORIDE 0.5 MG: 1 INJECTION, SOLUTION INTRAMUSCULAR; INTRAVENOUS; SUBCUTANEOUS at 09:01

## 2018-11-16 RX ADMIN — OXYCODONE HYDROCHLORIDE AND ACETAMINOPHEN 2 TABLET: 5; 325 TABLET ORAL at 13:15

## 2018-11-16 RX ADMIN — METOPROLOL TARTRATE 50 MG: 50 TABLET ORAL at 09:00

## 2018-11-16 RX ADMIN — NICOTINE 21 MG: 21 PATCH, EXTENDED RELEASE TRANSDERMAL at 09:00

## 2018-11-16 RX ADMIN — PREGABALIN 200 MG: 100 CAPSULE ORAL at 09:00

## 2018-11-16 RX ADMIN — OXYCODONE HYDROCHLORIDE AND ACETAMINOPHEN 2 TABLET: 5; 325 TABLET ORAL at 02:56

## 2018-11-16 RX ADMIN — OXYCODONE HYDROCHLORIDE AND ACETAMINOPHEN 2 TABLET: 5; 325 TABLET ORAL at 17:24

## 2018-11-16 RX ADMIN — DULOXETINE HYDROCHLORIDE 60 MG: 60 CAPSULE, DELAYED RELEASE ORAL at 09:00

## 2018-11-16 RX ADMIN — CLONIDINE HYDROCHLORIDE 0.3 MG: 0.1 TABLET ORAL at 08:59

## 2018-11-16 RX ADMIN — HYDROMORPHONE HYDROCHLORIDE 0.5 MG: 1 INJECTION, SOLUTION INTRAMUSCULAR; INTRAVENOUS; SUBCUTANEOUS at 13:15

## 2018-11-16 NOTE — PLAN OF CARE
DISCHARGE PLANNING     Discharge to home or other facility with appropriate resources Progressing        DISCHARGE PLANNING - CARE MANAGEMENT     Discharge to post-acute care or home with appropriate resources Progressing        INFECTION - ADULT     Absence of fever/infection during neutropenic period Progressing        Knowledge Deficit     Patient/family/caregiver demonstrates understanding of disease process, treatment plan, medications, and discharge instructions Progressing        PAIN - ADULT     Verbalizes/displays adequate comfort level or baseline comfort level Progressing        Potential for Falls     Patient will remain free of falls Progressing        SAFETY ADULT     Patient will remain free of falls Progressing     Maintain or return to baseline ADL function Progressing     Maintain or return mobility status to optimal level Progressing

## 2018-11-16 NOTE — SOCIAL WORK
Discussed the POC with the interdisciplinary team   Pt did have have stent placed yesterday, however has been incontinent of urine today as per RN  Pt will need a PCP and outpt CM referral upon discharge

## 2018-11-16 NOTE — DISCHARGE SUMMARY
Discharge Summary - Brad Woo 46 y o  male MRN: [id number]    Unit/Bed#: -01 Encounter: [id number]    Admission Date:   Admission Orders     Ordered        11/13/18 1831  Inpatient Admission (expected length of stay for this patient is greater than two midnights)  Once               Admitting Diagnosis: Essential hypertension [I10]  Abdominal pain [R10 9]  Renal calculus, left [N20 0]  Abdominal pain, acute, right upper quadrant [R10 11]    HPI:   Patient admitted the to this General surgery service with a question of gallbladder symptoms as well as left ureteral stone for inpatient treatment    Procedures Performed: No orders of the defined types were placed in this encounter  Summary of Hospital Course:   Consultations obtained with Medicine and Urology  The patient was taken to the operating room where he underwent cystoscopy and left ureteral stent insertion  He convalesced for 1 hospital night  On the next day he was tolerating his diet he had no evidence of an acute cholecystitis the left ureteral stone had been addressed  His pain was adequately controlled his discharged home in stable condition  Significant Findings, Care, Treatment and Services Provided:   Left ureteral stent insertion and cystoscopy    Complications:   None    Discharge Diagnosis:   Left renal colic secondary to obstructing left ureteral stone    Resolved Problems  Date Reviewed: 11/16/2018    None          Condition at Discharge: good         Discharge instructions/Information to patient and family:   See after visit summary for information provided to patient and family  Provisions for Follow-Up Care:  See after visit summary for information related to follow-up care and any pertinent home health orders  PCP: Kendall Macias DO    Disposition: Home    Planned Readmission: No    Discharge Statement   I spent 30 minutes discharging the patient  This time was spent on the day of discharge   I had direct contact with the patient on the day of discharge  Additional documentation is required if more than 30 minutes were spent on discharge  Discharge Medications:  See after visit summary for reconciled discharge medications provided to patient and family

## 2018-11-16 NOTE — PROGRESS NOTES
Progress Note - Terry Ghotra 46 y o  male MRN: [id number]    Unit/Bed#: -01 Encounter: [id number]      Assessment:  Left ureteral stone now in the lower pole status post stent placement  Voiding well with postvoid residual of 23 mL this morning  Flank pain is occurring only during voiding and most likely is secondary to transmission up the stent    Plan:  Continue short course of Levaquin  Continue tamsulosin  Will plan shockwave lithotripsy as an outpatient  Subjective: The patient complains of left flank pain, but only during voiding  He has no pain otherwise  He has voided about 4 times today without difficulty  Objective:     Vitals: Blood pressure 154/85, pulse 58, temperature 98 5 °F (36 9 °C), temperature source Tympanic, resp  rate 16, height 6' (1 829 m), weight 133 kg (292 lb 2 oz), SpO2 95 %  ,Body mass index is 39 62 kg/m²  Intake/Output Summary (Last 24 hours) at 11/16/18 1721  Last data filed at 11/16/18 1430   Gross per 24 hour   Intake              360 ml   Output              650 ml   Net             -290 ml       Physical Exam: Abdomen: soft, non-tender; bowel sounds normal; no masses,  no organomegaly     Invasive Devices     Peripheral Intravenous Line            Peripheral IV 11/13/18 Left Antecubital 3 days                Lab, Imaging and other studies: I have personally reviewed pertinent reports  KUB x-ray shows an 8 mm stone in the lower pole of the left kidney  Left ureteral stent in place      VTE Pharmacologic Prophylaxis: Sequential compression device (Venodyne)   VTE Mechanical Prophylaxis: sequential compression device

## 2018-11-16 NOTE — DISCHARGE INSTRUCTIONS

## 2018-11-17 VITALS
HEIGHT: 72 IN | SYSTOLIC BLOOD PRESSURE: 149 MMHG | HEART RATE: 62 BPM | TEMPERATURE: 98.2 F | OXYGEN SATURATION: 95 % | WEIGHT: 292.13 LBS | BODY MASS INDEX: 39.57 KG/M2 | RESPIRATION RATE: 18 BRPM | DIASTOLIC BLOOD PRESSURE: 92 MMHG

## 2018-11-17 PROBLEM — T78.40XA ALLERGIC REACTION CAUSED BY A DRUG: Chronic | Status: RESOLVED | Noted: 2018-11-13 | Resolved: 2018-11-17

## 2018-11-17 PROBLEM — D72.829 LEUKOCYTOSIS: Chronic | Status: RESOLVED | Noted: 2018-11-13 | Resolved: 2018-11-17

## 2018-11-17 PROBLEM — R07.89 OTHER CHEST PAIN: Chronic | Status: RESOLVED | Noted: 2018-11-14 | Resolved: 2018-11-17

## 2018-11-17 PROBLEM — R10.84 GENERALIZED ABDOMINAL PAIN: Chronic | Status: ACTIVE | Noted: 2018-11-13

## 2018-11-17 PROCEDURE — 99238 HOSP IP/OBS DSCHRG MGMT 30/<: CPT | Performed by: NURSE PRACTITIONER

## 2018-11-17 PROCEDURE — 90682 RIV4 VACC RECOMBINANT DNA IM: CPT | Performed by: SURGERY

## 2018-11-17 PROCEDURE — 90732 PPSV23 VACC 2 YRS+ SUBQ/IM: CPT | Performed by: SURGERY

## 2018-11-17 PROCEDURE — G0009 ADMIN PNEUMOCOCCAL VACCINE: HCPCS | Performed by: SURGERY

## 2018-11-17 RX ORDER — LISINOPRIL 20 MG/1
40 TABLET ORAL DAILY
Status: CANCELLED | OUTPATIENT
Start: 2018-11-17

## 2018-11-17 RX ADMIN — INFLUENZA A VIRUS A/MICHIGAN/45/2015 (H1N1) RECOMBINANT HEMAGGLUTININ ANTIGEN, INFLUENZA A VIRUS A/SINGAPORE/INFIMH-16-0019/2016 (H3N2) RECOMBINANT HEMAGGLUTININ ANTIGEN, INFLUENZA B VIRUS B/MARYLAND/15/2016 RECOMBINANT HEMAGGLUTININ ANTIGEN, AND INFLUENZA B VIRUS B/PHUKET/3073/2013 RECOMBINANT HEMAGGLUTININ ANTIGEN 0.5 ML: 45; 45; 45; 45 INJECTION INTRAMUSCULAR at 11:36

## 2018-11-17 RX ADMIN — TAMSULOSIN HYDROCHLORIDE 0.8 MG: 0.4 CAPSULE ORAL at 10:23

## 2018-11-17 RX ADMIN — PREGABALIN 200 MG: 100 CAPSULE ORAL at 10:23

## 2018-11-17 RX ADMIN — SODIUM CHLORIDE 125 ML/HR: 9 INJECTION, SOLUTION INTRAVENOUS at 01:48

## 2018-11-17 RX ADMIN — METOPROLOL TARTRATE 50 MG: 50 TABLET ORAL at 10:23

## 2018-11-17 RX ADMIN — SODIUM CHLORIDE 125 ML/HR: 9 INJECTION, SOLUTION INTRAVENOUS at 10:22

## 2018-11-17 RX ADMIN — OXYCODONE HYDROCHLORIDE AND ACETAMINOPHEN 2 TABLET: 5; 325 TABLET ORAL at 05:24

## 2018-11-17 RX ADMIN — CLONIDINE HYDROCHLORIDE 0.3 MG: 0.1 TABLET ORAL at 10:22

## 2018-11-17 RX ADMIN — OXYCODONE HYDROCHLORIDE AND ACETAMINOPHEN 2 TABLET: 5; 325 TABLET ORAL at 10:23

## 2018-11-17 RX ADMIN — HYDROMORPHONE HYDROCHLORIDE 0.5 MG: 1 INJECTION, SOLUTION INTRAMUSCULAR; INTRAVENOUS; SUBCUTANEOUS at 01:05

## 2018-11-17 RX ADMIN — OXYCODONE HYDROCHLORIDE AND ACETAMINOPHEN 2 TABLET: 5; 325 TABLET ORAL at 01:05

## 2018-11-17 RX ADMIN — NICOTINE 21 MG: 21 PATCH, EXTENDED RELEASE TRANSDERMAL at 10:25

## 2018-11-17 RX ADMIN — HYDROMORPHONE HYDROCHLORIDE 0.5 MG: 1 INJECTION, SOLUTION INTRAMUSCULAR; INTRAVENOUS; SUBCUTANEOUS at 05:24

## 2018-11-17 RX ADMIN — DULOXETINE HYDROCHLORIDE 60 MG: 60 CAPSULE, DELAYED RELEASE ORAL at 10:23

## 2018-11-17 RX ADMIN — PNEUMOCOCCAL VACCINE POLYVALENT 0.5 ML
25; 25; 25; 25; 25; 25; 25; 25; 25; 25; 25; 25; 25; 25; 25; 25; 25; 25; 25; 25; 25; 25; 25 INJECTION, SOLUTION INTRAMUSCULAR; SUBCUTANEOUS at 11:37

## 2018-11-17 RX ADMIN — HYDROMORPHONE HYDROCHLORIDE 0.5 MG: 1 INJECTION, SOLUTION INTRAMUSCULAR; INTRAVENOUS; SUBCUTANEOUS at 10:24

## 2018-11-17 NOTE — ASSESSMENT & PLAN NOTE
· RUQ US: Diffuse hepatic steatosis   This is seen on the prior CT  Gallbladder findings suggesting adenomyomatosis   No cholelithiasis,  gallbladder wall thickening, or biliary dilatation is appreciated, however the sonographic Feng Terence sign is reported positive   Correlate clinically  and HIDA scan could further evaluate as clinically indicated  · Secondary to recent stent placement, patient will continue to have pain and discomfort until lithotripsy is performed for kidney stone

## 2018-11-17 NOTE — ASSESSMENT & PLAN NOTE
· Possibly secondary to Toradol  · Patient reports itching, hives and sweats approximately 30 minutes after each injection    He did not tell the ER staff when it happened  · Stable

## 2018-11-17 NOTE — ASSESSMENT & PLAN NOTE
· CT abd/pelvis: There is a nonobstructive 7 mm calculi noted in the proximal left ureter causing mild dilatation of the collecting system of the left side  · Surgery states that stone will be managed  with lithotripsy  ·  Status post cystoscopy and left ureteral stenting by urology

## 2018-11-17 NOTE — DISCHARGE SUMMARY
Discharge- Andres Mondragon 1965, 46 y o  male MRN: [id number]    Unit/Bed#: -01 Encounter: [id number]    Primary Care Provider: Mesha Swartz DO   Date and time admitted to hospital: 11/13/2018 12:13 PM        * Other chest pain   Assessment & Plan    · Resolve     Essential hypertension   Assessment & Plan    · BP mildly up, likely with pain  · Continue home meds        Generalized abdominal pain   Assessment & Plan    · RUQ US: Diffuse hepatic steatosis   This is seen on the prior CT  Gallbladder findings suggesting adenomyomatosis   No cholelithiasis,  gallbladder wall thickening, or biliary dilatation is appreciated, however the sonographic Radha Gills sign is reported positive   Correlate clinically  and HIDA scan could further evaluate as clinically indicated  · Secondary to recent stent placement, patient will continue to have pain and discomfort until lithotripsy is performed for kidney stone  Left ureteral stone   Assessment & Plan      · CT abd/pelvis: There is a nonobstructive 7 mm calculi noted in the proximal left ureter causing mild dilatation of the collecting system of the left side  · Surgery states that stone will be managed  with lithotripsy  ·  Status post cystoscopy and left ureteral stenting by urology  Leukocytosis   Assessment & Plan    · Resolved      Generalized anxiety disorder   Assessment & Plan    · cymbalta when able to take p o  · This is a chronic condition  · He is currently stable     Obesity   Assessment & Plan    · BMI 39  · Weight loss recommended, follow up with PCP to consider dietician referral     Hepatic steatosis   Assessment & Plan    · Noted on CT  · Chronic  · stable     Allergic reaction caused by a drug   Assessment & Plan    · Possibly secondary to Toradol  · Patient reports itching, hives and sweats approximately 30 minutes after each injection    He did not tell the ER staff when it happened  · Stable      Benign prostate hyperplasia Assessment & Plan    · Continue flomax  · Chronic  · Stable      Tobacco use disorder   Assessment & Plan    · Nicotine patch         Discharging Physician / Practitioner: [employer], [address]  PCP: Oneal Alpers, DO  Admission Date:   Admission Orders     Ordered        11/13/18 1831  Inpatient Admission (expected length of stay for this patient is greater than two midnights)  Once             Discharge Date: 11/17/18    Resolved Problems  Date Reviewed: 11/17/2018    None          Consultations During Hospital Stay:  · Surgery:  Patient may follow up as an outpatient for review of adenomyosis mitosis of the gallbladder  · Urology:  Cystoscopy with retrograde pyelogram with insertion of left ureteral stent, patient has a 7 mm kidney stone that will require lithotripsy  Procedures Performed:   · CT of the abdomen and pelvis: There is a nonobstructive 7 mm calculi noted in the proximal left ureter causing mild dilatation of the collecting system of the left side  Fatty liver  · Ultrasound right upper quadrant: Diffuse hepatic steatosis  This is seen on the prior CT  Gallbladder findings suggesting adenomyomatosis  No cholelithiasis, gallbladder wall thickening, or biliary dilatation is appreciated, however the sonographic Dorrene Saint sign is reported positive  Correlate clinically and HIDA scan could further evaluate as clinically indicated      · Chest x-ray:  Normal AP chest  · X-ray of the abdomen:  KUB:  8 mm calcification is projected over the lower pole of the left kidney  · Repeat x-ray of the abdomen:  KUB:  Adequate appearing position of the left-sided double-J ureteral stent  Left-sided nephrolithiasis appears to be present  Significant Findings / Test Results:   · None    Incidental Findings:   · None     Test Results Pending at Discharge (will require follow up):    · None     Outpatient Tests Requested:  · Should follow up with surgery for gallbladder    Complications:  None    Reason for Admission:  No    Hospital Course:     Emma Clark is a 46 y o  male patient who originally presented to the hospital on 11/13/2018 due to admitted to the surgery service due to RUQ pain and abnormal US  He reported hives and he is itching after the Toradol injection  Notes symptoms down stairs after injection with hives and again approximately 30 minutes when on the floor after the injection, he reports feeling sweaty and hot  He never told ER staff about his symptoms  Patient has history of narcotic use secondary to back injury/surgery  He previous was on Suboxone and had allergic reaction from that  He is know planning to get on Medical Marijuana in the future  He underwent a left ureteral stent placement for a left ureteral stone measuring 7-8 mm, patient should follow up as an outpatient for lithotripsy  When Ko catheter was removed patient was having some urgency issues with urinating, this has resolved  Patient will also follow up with surgery for outpatient treatment for his gallbladder  Please see above list of diagnoses and related plan for additional information  Condition at Discharge: fair     Discharge Day Visit / Exam:     Subjective:  Patient sitting in bed laughing and joking with me, he states that he has some mild left-sided pain, I did state to him it secondary to that stent in that he would have this until the kidney stone was removed as an outpatient  The patient states that he feels well enough to go home therefore we will discharge him with orders to follow up as an outpatient      Vitals: Blood Pressure: 149/92 (11/17/18 0729)  Pulse: 62 (11/17/18 0729)  Temperature: 98 2 °F (36 8 °C) (11/17/18 0729)  Temp Source: Tympanic (11/17/18 0729)  Respirations: 18 (11/17/18 0729)  Height: 6' (182 9 cm) (Stated) (11/13/18 1850)  Weight - Scale: 133 kg (292 lb 2 oz) (Standing scale) (11/13/18 1850)  SpO2: 95 % (11/17/18 0729)  Exam:   Physical Exam   Constitutional: He is oriented to person, place, and time  Vital signs are normal  He appears well-developed and well-nourished  He is cooperative  HENT:   Head: Normocephalic and atraumatic  Nose: Nose normal    Mouth/Throat: Mucous membranes are normal    Eyes: Pupils are equal, round, and reactive to light  Conjunctivae and EOM are normal    Neck: Normal range of motion and full passive range of motion without pain  Neck supple  Cardiovascular: Normal rate, regular rhythm, normal heart sounds and normal pulses  Denies any chest  discomfort   Pulmonary/Chest: Effort normal and breath sounds normal    Abdominal: Soft  Normal appearance and bowel sounds are normal        Genitourinary:   Genitourinary Comments: Urgency with urination has resolved   Musculoskeletal: Normal range of motion  Neurological: He is alert and oriented to person, place, and time  Skin: Skin is warm and dry  Psychiatric: He has a normal mood and affect  His speech is normal and behavior is normal        Discussion with Family:  None, no family at bedside    Discharge instructions/Information to patient and family:   See after visit summary for information provided to patient and family  Provisions for Follow-Up Care:  See after visit summary for information related to follow-up care and any pertinent home health orders  Disposition:     Home    For Discharges to [facility] SNF:   · Not Applicable to this Patient - Not Applicable to this Patient    Planned Readmission:  No     Discharge Statement:  I spent 30 minutes discharging the patient  This time was spent on the day of discharge  I had direct contact with the patient on the day of discharge  Greater than 50% of the total time was spent examining patient, answering all patient questions, arranging and discussing plan of care with patient as well as directly providing post-discharge instructions  Additional time then spent on discharge activities      Discharge Medications:  See after visit summary for reconciled discharge medications provided to patient and family        ** Please Note: This note has been constructed using a voice recognition system **

## 2018-11-18 LAB
BACTERIA BLD CULT: NORMAL
BACTERIA BLD CULT: NORMAL
BACTERIA UR CULT: NORMAL
BACTERIA UR CULT: NORMAL

## 2018-11-19 ENCOUNTER — TRANSITIONAL CARE MANAGEMENT (OUTPATIENT)
Dept: INTERNAL MEDICINE CLINIC | Facility: CLINIC | Age: 53
End: 2018-11-19

## 2018-11-19 NOTE — SOCIAL WORK
Pt discharged over the week-end  PCP appt with Dr Becky Williamson made for 11/30/18 at 8:30 am   TC to pt, spoke to pt daughter Aleena Woodard who was made aware of same

## 2018-11-22 ENCOUNTER — APPOINTMENT (EMERGENCY)
Dept: CT IMAGING | Facility: HOSPITAL | Age: 53
End: 2018-11-22
Payer: MEDICARE

## 2018-11-22 ENCOUNTER — HOSPITAL ENCOUNTER (EMERGENCY)
Facility: HOSPITAL | Age: 53
Discharge: HOME | End: 2018-11-22
Payer: MEDICARE

## 2018-11-22 VITALS
SYSTOLIC BLOOD PRESSURE: 155 MMHG | WEIGHT: 285 LBS | HEIGHT: 72 IN | DIASTOLIC BLOOD PRESSURE: 82 MMHG | HEART RATE: 18 BPM | OXYGEN SATURATION: 98 % | TEMPERATURE: 98.6 F | RESPIRATION RATE: 18 BRPM | BODY MASS INDEX: 38.6 KG/M2

## 2018-11-22 DIAGNOSIS — R10.9 CHRONIC LEFT FLANK PAIN: ICD-10-CM

## 2018-11-22 DIAGNOSIS — G89.29 CHRONIC LEFT FLANK PAIN: ICD-10-CM

## 2018-11-22 DIAGNOSIS — N20.0 KIDNEY STONE ON LEFT SIDE: Primary | ICD-10-CM

## 2018-11-22 LAB
ALBUMIN SERPL BCP-MCNC: 4.1 G/DL (ref 3.5–5.7)
ALP SERPL-CCNC: 70 U/L (ref 40–150)
ALT SERPL W P-5'-P-CCNC: 16 U/L (ref 7–52)
ANION GAP SERPL CALCULATED.3IONS-SCNC: 6 MMOL/L (ref 4–13)
APTT PPP: 40 SECONDS (ref 26–38)
AST SERPL W P-5'-P-CCNC: 12 U/L (ref 13–39)
BACTERIA UR QL AUTO: ABNORMAL /HPF
BASOPHILS # BLD AUTO: 0.1 THOUSANDS/ΜL (ref 0–0.1)
BASOPHILS NFR BLD AUTO: 1 % (ref 0–2)
BILIRUB SERPL-MCNC: 0.5 MG/DL (ref 0.2–1)
BILIRUB UR QL STRIP: NEGATIVE
BUN SERPL-MCNC: 19 MG/DL (ref 7–25)
CALCIUM SERPL-MCNC: 9.2 MG/DL (ref 8.6–10.5)
CHLORIDE SERPL-SCNC: 102 MMOL/L (ref 98–107)
CLARITY UR: ABNORMAL
CO2 SERPL-SCNC: 27 MMOL/L (ref 21–31)
COLOR UR: YELLOW
CREAT SERPL-MCNC: 1.08 MG/DL (ref 0.7–1.3)
EOSINOPHIL # BLD AUTO: 0.3 THOUSAND/ΜL (ref 0–0.61)
EOSINOPHIL NFR BLD AUTO: 3 % (ref 0–5)
ERYTHROCYTE [DISTWIDTH] IN BLOOD BY AUTOMATED COUNT: 13.4 % (ref 11.5–14.5)
GFR SERPL CREATININE-BSD FRML MDRD: 77 ML/MIN/1.73SQ M
GLUCOSE SERPL-MCNC: 153 MG/DL (ref 65–99)
GLUCOSE UR STRIP-MCNC: ABNORMAL MG/DL
HCT VFR BLD AUTO: 45.9 % (ref 36.5–49.3)
HGB BLD-MCNC: 15.2 G/DL (ref 14–18)
HGB UR QL STRIP.AUTO: ABNORMAL
INR PPP: 1.21 (ref 0.9–1.5)
KETONES UR STRIP-MCNC: NEGATIVE MG/DL
LEUKOCYTE ESTERASE UR QL STRIP: ABNORMAL
LIPASE SERPL-CCNC: 94 U/L (ref 11–82)
LYMPHOCYTES # BLD AUTO: 1.5 THOUSANDS/ΜL (ref 0.6–4.47)
LYMPHOCYTES NFR BLD AUTO: 14 % (ref 21–51)
MCH RBC QN AUTO: 30.1 PG (ref 26–34)
MCHC RBC AUTO-ENTMCNC: 33.2 G/DL (ref 31–37)
MCV RBC AUTO: 91 FL (ref 81–99)
MONOCYTES # BLD AUTO: 0.6 THOUSAND/ΜL (ref 0.17–1.22)
MONOCYTES NFR BLD AUTO: 5 % (ref 2–12)
NEUTROPHILS # BLD AUTO: 8.2 THOUSANDS/ΜL (ref 1.4–6.5)
NEUTS SEG NFR BLD AUTO: 77 % (ref 42–75)
NITRITE UR QL STRIP: NEGATIVE
NON-SQ EPI CELLS URNS QL MICRO: ABNORMAL /HPF
NRBC BLD AUTO-RTO: 0 /100 WBCS
PH UR STRIP.AUTO: 6.5 [PH] (ref 5–8)
PLATELET # BLD AUTO: 272 THOUSANDS/UL (ref 149–390)
PMV BLD AUTO: 8.7 FL (ref 8.6–11.7)
POTASSIUM SERPL-SCNC: 3.5 MMOL/L (ref 3.5–5.5)
PROT SERPL-MCNC: 6.9 G/DL (ref 6.4–8.9)
PROT UR STRIP-MCNC: ABNORMAL MG/DL
PROTHROMBIN TIME: 14.1 SECONDS (ref 10.2–13)
RBC # BLD AUTO: 5.07 MILLION/UL (ref 4.3–5.9)
RBC #/AREA URNS AUTO: ABNORMAL /HPF
SODIUM SERPL-SCNC: 135 MMOL/L (ref 134–143)
SP GR UR STRIP.AUTO: 1.02 (ref 1–1.03)
UROBILINOGEN UR QL STRIP.AUTO: 0.2 E.U./DL
WBC # BLD AUTO: 10.6 THOUSAND/UL (ref 4.8–10.8)
WBC #/AREA URNS AUTO: ABNORMAL /HPF

## 2018-11-22 PROCEDURE — 74176 CT ABD & PELVIS W/O CONTRAST: CPT

## 2018-11-22 PROCEDURE — 96375 TX/PRO/DX INJ NEW DRUG ADDON: CPT

## 2018-11-22 PROCEDURE — 96361 HYDRATE IV INFUSION ADD-ON: CPT

## 2018-11-22 PROCEDURE — 99284 EMERGENCY DEPT VISIT MOD MDM: CPT

## 2018-11-22 PROCEDURE — 81001 URINALYSIS AUTO W/SCOPE: CPT

## 2018-11-22 PROCEDURE — 96374 THER/PROPH/DIAG INJ IV PUSH: CPT

## 2018-11-22 PROCEDURE — 80053 COMPREHEN METABOLIC PANEL: CPT

## 2018-11-22 PROCEDURE — 85610 PROTHROMBIN TIME: CPT

## 2018-11-22 PROCEDURE — 36415 COLL VENOUS BLD VENIPUNCTURE: CPT

## 2018-11-22 PROCEDURE — 83690 ASSAY OF LIPASE: CPT

## 2018-11-22 PROCEDURE — 85730 THROMBOPLASTIN TIME PARTIAL: CPT

## 2018-11-22 PROCEDURE — 85025 COMPLETE CBC W/AUTO DIFF WBC: CPT

## 2018-11-22 RX ORDER — HYDROMORPHONE HCL/PF 1 MG/ML
1 SYRINGE (ML) INJECTION ONCE
Status: COMPLETED | OUTPATIENT
Start: 2018-11-22 | End: 2018-11-22

## 2018-11-22 RX ORDER — ONDANSETRON 2 MG/ML
4 INJECTION INTRAMUSCULAR; INTRAVENOUS ONCE
Status: COMPLETED | OUTPATIENT
Start: 2018-11-22 | End: 2018-11-22

## 2018-11-22 RX ORDER — LORAZEPAM 2 MG/ML
2 INJECTION INTRAMUSCULAR ONCE
Status: COMPLETED | OUTPATIENT
Start: 2018-11-22 | End: 2018-11-22

## 2018-11-22 RX ADMIN — LORAZEPAM 2 MG: 2 INJECTION INTRAMUSCULAR; INTRAVENOUS at 09:57

## 2018-11-22 RX ADMIN — ONDANSETRON HYDROCHLORIDE 4 MG: 2 INJECTION INTRAMUSCULAR; INTRAVENOUS at 09:10

## 2018-11-22 RX ADMIN — HYDROMORPHONE HYDROCHLORIDE 1 MG: 1 INJECTION, SOLUTION INTRAMUSCULAR; INTRAVENOUS; SUBCUTANEOUS at 09:10

## 2018-11-22 RX ADMIN — SODIUM CHLORIDE 1000 ML: 0.9 INJECTION, SOLUTION INTRAVENOUS at 09:09

## 2018-11-22 NOTE — ED PROVIDER NOTES
History  Chief Complaint   Patient presents with    Flank Pain     Left kidney stone last week stent placed patient has worsening left flank pain  Mallory Perez is a 57-year-old male who came to the emergency department with the left flank pain that started 5 days prior to arrival   Patient is known to have left ureteral stone for which he underwent ureteral stent placement that was done by a local urologist   She has after the procedure the patient has been having pain  Patient denies hematuria, fever or chills  History provided by:  Patient   used: No    Flank Pain   Pain location:  L flank  Pain quality: aching    Pain radiates to:  Does not radiate  Pain severity:  Severe  Onset quality:  Gradual  Duration:  5 days  Timing:  Constant  Progression:  Worsening  Chronicity:  Recurrent  Context: not alcohol use, not awakening from sleep, not diet changes, not eating, not laxative use, not medication withdrawal, not previous surgeries, not recent illness, not recent sexual activity, not recent travel, not retching, not sick contacts, not suspicious food intake and not trauma    Relieved by:  Nothing  Worsened by:  Nothing  Ineffective treatments:  None tried  Associated symptoms: no anorexia, no belching, no chest pain, no chills, no constipation, no cough, no diarrhea, no dysuria, no fatigue, no fever, no flatus, no hematemesis, no hematochezia, no hematuria, no melena, no nausea, no shortness of breath, no sore throat, no vaginal bleeding, no vaginal discharge and no vomiting    Risk factors: recent hospitalization        Prior to Admission Medications   Prescriptions Last Dose Informant Patient Reported? Taking?    DULoxetine (CYMBALTA) 60 mg delayed release capsule 11/22/2018 at Unknown time  No Yes   Sig: Take 1 capsule (60 mg total) by mouth daily   cloNIDine (CATAPRES) 0 3 mg tablet   No No   Sig: Take 1 tablet (0 3 mg total) by mouth 2 (two) times a day for 14 days furosemide (LASIX) 40 mg tablet Not Taking at Unknown time  No No   Sig: Take 1 tablet (40 mg total) by mouth daily   Patient not taking: Reported on 10/27/2018    lisinopril (ZESTRIL) 40 mg tablet Not Taking at Unknown time  Yes No   Sig: Take 40 mg by mouth daily   metoprolol tartrate (LOPRESSOR) 50 mg tablet   No No   Sig: Take 1 tablet (50 mg total) by mouth 2 (two) times a day for 14 days   nicotine (NICODERM CQ) 21 mg/24 hr TD 24 hr patch Not Taking at Unknown time  No No   Sig: Place 1 patch on the skin daily   Patient not taking: Reported on 11/22/2018    pregabalin (LYRICA) 200 MG capsule Not Taking at Unknown time  Yes No   Sig: Take 1 capsule by mouth 2 (two) times a day   tamsulosin (FLOMAX) 0 4 mg 11/22/2018 at Unknown time  Yes Yes   Sig: Take 1 capsule by mouth daily      Facility-Administered Medications: None       Past Medical History:   Diagnosis Date    Anxiety 5/2/2018    Arthritis     Chest pain     Last Assessed: 10/5/2016    Hypertension     Kidney stone     Obesity     Pleural effusion     Last Assessed: 9/15/2017    Pneumonia     Sciatica        Past Surgical History:   Procedure Laterality Date    CHEST TUBE INSERTION      Last Assessed: 9/15/2017    FL CYSTOGRAM  11/15/2018    HERNIA REPAIR  2011    LUMBAR LAMINECTOMY  2009    AL CYSTOURETHROSCOPY,URETER CATHETER Left 11/15/2018    Procedure: CYSTOSCOPY RETROGRADE PYELOGRAM WITH INSERTION STENT URETERAL;  Surgeon: Mark Marie MD;  Location: [facility] MAIN OR;  Service: Urology    TONSILLECTOMY      Last Assessed: 9/15/2017       Family History   Problem Relation Age of Onset    Other Mother         Cardiac Disorder    Heart failure Mother     Diabetes Mother     Cancer Father     Liver cancer Father     Cancer Brother     Testicular cancer Brother     Lung cancer Brother     Liver cancer Family      I have reviewed and agree with the history as documented      Social History   Substance Use Topics    Smoking status: Current Every Day Smoker     Packs/day: 0 50     Types: Cigarettes    Smokeless tobacco: Never Used    Alcohol use No      Comment: Former consumption of alcohol        Review of Systems   Constitutional: Negative for chills, fatigue and fever  HENT: Negative for sore throat  Eyes: Negative  Respiratory: Negative for cough and shortness of breath  Cardiovascular: Negative for chest pain  Gastrointestinal: Negative for anorexia, constipation, diarrhea, flatus, hematemesis, hematochezia, melena, nausea and vomiting  Endocrine: Negative  Genitourinary: Positive for flank pain  Negative for dysuria, hematuria, vaginal bleeding and vaginal discharge  Skin: Negative  Allergic/Immunologic: Negative  Neurological: Negative  Hematological: Negative  Psychiatric/Behavioral: Negative  Physical Exam  Physical Exam   Constitutional: He is oriented to person, place, and time  He appears well-developed and well-nourished  No distress  HENT:   Head: Normocephalic and atraumatic  Right Ear: External ear normal    Left Ear: External ear normal    Nose: Nose normal    Mouth/Throat: Oropharynx is clear and moist  No oropharyngeal exudate  Eyes: Pupils are equal, round, and reactive to light  Conjunctivae and EOM are normal  Right eye exhibits no discharge  Left eye exhibits no discharge  No scleral icterus  Neck: Normal range of motion  Neck supple  No tracheal deviation present  No thyromegaly present  Cardiovascular: Normal rate, regular rhythm, normal heart sounds and intact distal pulses  Pulmonary/Chest: Effort normal and breath sounds normal  No respiratory distress  Abdominal: Soft  Bowel sounds are normal  He exhibits no distension  There is no tenderness  There is CVA tenderness  Musculoskeletal: Normal range of motion  He exhibits no edema, tenderness or deformity  Lymphadenopathy:     He has no cervical adenopathy     Neurological: He is alert and oriented to person, place, and time  No cranial nerve deficit or sensory deficit  He exhibits normal muscle tone  Coordination normal    Skin: Skin is warm and dry  No rash noted  He is not diaphoretic  No erythema  No pallor  Psychiatric: He has a normal mood and affect  His behavior is normal  Judgment and thought content normal    Nursing note and vitals reviewed        Vital Signs  ED Triage Vitals [11/22/18 0850]   Temperature Pulse Respirations Blood Pressure SpO2   97 6 °F (36 4 °C) 74 18 (!) 200/106 98 %      Temp Source Heart Rate Source Patient Position - Orthostatic VS BP Location FiO2 (%)   Tympanic Monitor Lying Left arm --      Pain Score       8           Vitals:    11/22/18 0850 11/22/18 0900 11/22/18 0957 11/22/18 1000   BP: (!) 200/106 (S) (!) 200/106 (!) 186/101 (!) 186/101   Pulse: 74  77    Patient Position - Orthostatic VS: Lying  Lying        Visual Acuity      ED Medications  Medications   sodium chloride 0 9 % bolus 1,000 mL (0 mL Intravenous Stopped 11/22/18 1027)   HYDROmorphone (DILAUDID) injection 1 mg (1 mg Intravenous Given 11/22/18 0910)   ondansetron (ZOFRAN) injection 4 mg (4 mg Intravenous Given 11/22/18 0910)   LORazepam (ATIVAN) 2 mg/mL injection 2 mg (2 mg Intravenous Given 11/22/18 0957)       Diagnostic Studies  Results Reviewed     Procedure Component Value Units Date/Time    Comprehensive metabolic panel [[id number]]  (Abnormal) Collected:  11/22/18 0942    Lab Status:  Final result Specimen:  Blood from Arm, Left Updated:  11/22/18 1016     Sodium 135 mmol/L      Potassium 3 5 mmol/L      Chloride 102 mmol/L      CO2 27 mmol/L      ANION GAP 6 mmol/L      BUN 19 mg/dL      Creatinine 1 08 mg/dL      Glucose 153 (H) mg/dL      Calcium 9 2 mg/dL      AST 12 (L) U/L      ALT 16 U/L      Alkaline Phosphatase 70 U/L      Total Protein 6 9 g/dL      Albumin 4 1 g/dL      Total Bilirubin 0 50 mg/dL      eGFR 77 ml/min/1 73sq m     Narrative:         National Kidney Disease Education Program recommendations are as follows:  GFR calculation is accurate only with a steady state creatinine  Chronic Kidney disease less than 60 ml/min/1 73 sq  meters  Kidney failure less than 15 ml/min/1 73 sq  meters      Lipase [[id number]]  (Abnormal) Collected:  11/22/18 0942    Lab Status:  Final result Specimen:  Blood from Arm, Left Updated:  11/22/18 1016     Lipase 94 (H) u/L     Urine Microscopic [[id number]]  (Abnormal) Collected:  11/22/18 0952    Lab Status:  Final result Specimen:  Urine from Urine, Clean Catch Updated:  11/22/18 1010     RBC, UA Innumerable (A) /hpf      WBC, UA 4-10 (A) /hpf      Epithelial Cells Occasional /hpf      Bacteria, UA None Seen /hpf     Protime-INR [[id number]]  (Abnormal) Collected:  11/22/18 0942    Lab Status:  Final result Specimen:  Blood from Arm, Left Updated:  11/22/18 1009     Protime 14 1 (H) seconds      INR 1 21    APTT [[id number]]  (Abnormal) Collected:  11/22/18 0942    Lab Status:  Final result Specimen:  Blood from Arm, Left Updated:  11/22/18 1009     PTT 40 (H) seconds     UA w Reflex to Microscopic [[id number]]  (Abnormal) Collected:  11/22/18 0952    Lab Status:  Final result Specimen:  Urine from Urine, Clean Catch Updated:  11/22/18 1001     Color, UA Yellow     Clarity, UA Hazy     Specific [place], UA 1 020     pH, UA 6 5     Leukocytes, UA Trace (A)     Nitrite, UA Negative     Protein, UA Trace (A) mg/dl      Glucose, UA 1+ (A) mg/dl      Ketones, UA Negative mg/dl      Urobilinogen, UA 0 2 E U /dl      Bilirubin, UA Negative     Blood, UA 3+ (A)    CBC and differential [[id number]]  (Abnormal) Collected:  11/22/18 0904    Lab Status:  Final result Specimen:  Blood from Arm, Left Updated:  11/22/18 0927     WBC 10 60 Thousand/uL      RBC 5 07 Million/uL      Hemoglobin 15 2 g/dL      Hematocrit 45 9 %      MCV 91 fL      MCH 30 1 pg      MCHC 33 2 g/dL      RDW 13 4 %      MPV 8 7 fL      Platelets 780 Thousands/uL      nRBC 0 /100 WBCs      Neutrophils Relative 77 (H) %      Lymphocytes Relative 14 (L) %      Monocytes Relative 5 %      Eosinophils Relative 3 %      Basophils Relative 1 %      Neutrophils Absolute 8 20 (H) Thousands/µL      Lymphocytes Absolute 1 50 Thousands/µL      Monocytes Absolute 0 60 Thousand/µL      Eosinophils Absolute 0 30 Thousand/µL      Basophils Absolute 0 10 Thousands/µL                  CT renal stone study abdomen pelvis without contrast   Final Result by Ken Hummel (11/22 1005)   7mm stone in the lower pole of the left kidney  There is a left ureteral   stent  Signed by NASIM Ross  Procedures  Procedures       Phone Contacts  ED Phone Contact    ED Course  ED Course as of Nov 22 1031   Thu Nov 22, 2018   0945 Patient is resting comfortably on the stretcher  He states that he feels better now after the IV narcotic analgesics  1028 Patient is more comfortable now                                  MDM  Number of Diagnoses or Management Options  Chronic left flank pain: established and worsening  Kidney stone on left side: established and worsening     Amount and/or Complexity of Data Reviewed  Clinical lab tests: ordered and reviewed  Tests in the radiology section of CPT®: ordered and reviewed  Tests in the medicine section of CPT®: ordered and reviewed  Decide to obtain previous medical records or to obtain history from someone other than the patient: yes  Review and summarize past medical records: yes  Independent visualization of images, tracings, or specimens: yes    Risk of Complications, Morbidity, and/or Mortality  Presenting problems: low  Diagnostic procedures: low  Management options: low    Patient Progress  Patient progress: improved    CritCare Time    Disposition  Final diagnoses:   Kidney stone on left side   Chronic left flank pain     Time reflects when diagnosis was documented in both MDM as applicable and the Disposition within this note     Time User Action Codes Description Comment    11/22/2018 10:29 AM Brianda hAmadi Add [N20 0] Kidney stone on left side     11/22/2018 10:29 AM Alice Moreno Add [R10 9,  G89 29] Chronic left flank pain       ED Disposition     ED Disposition Condition Comment    Discharge  Ben Hecpaulinesandra discharge to home/self care  Condition at discharge: Good        Follow-up Information     Follow up With Specialties Details Why Leopoldo Munda, MD Urology Schedule an appointment as soon as possible for a visit  [address] [place] [address]  [phone]            Patient's Medications   Discharge Prescriptions    No medications on file     No discharge procedures on file      ED Provider  Electronically Signed by           Mary Hallman MD  11/22/18 0929

## 2018-11-22 NOTE — DISCHARGE INSTRUCTIONS
Kidney Stones   WHAT YOU NEED TO KNOW:   Kidney stones form in the urinary system when the water and waste in your urine are out of balance  When this happens, certain types of waste crystals separate from the urine  The crystals build up and form kidney stones  You may have 1 or more kidney stones  DISCHARGE INSTRUCTIONS:   Return to the emergency department if:   · You have vomiting that is not relieved by medicine  Contact your healthcare provider if:   · You have a fever  · You have trouble passing urine  · You see blood in your urine  · You have severe pain  · You have any questions or concerns about your condition or care  Medicines:   · NSAIDs , such as ibuprofen, help decrease swelling, pain, and fever  This medicine is available with or without a doctor's order  NSAIDs can cause stomach bleeding or kidney problems in certain people  If you take blood thinner medicine, always ask your healthcare provider if NSAIDs are safe for you  Always read the medicine label and follow directions  · Prescription medicine  may be given  Ask how to take this medicine safely  · Medicines  to balance your electrolytes may be needed  · Take your medicine as directed  Contact your healthcare provider if you think your medicine is not helping or if you have side effects  Tell him or her if you are allergic to any medicine  Keep a list of the medicines, vitamins, and herbs you take  Include the amounts, and when and why you take them  Bring the list or the pill bottles to follow-up visits  Carry your medicine list with you in case of an emergency  Follow up with your healthcare provider as directed: You may need to return for more tests  Write down your questions so you remember to ask them during your visits  Self-care:   · Drink plenty of liquids  Your healthcare provider may tell you to drink at least 8 to 12 (eight-ounce) cups of liquids each day   This helps flush out the kidney stones when you urinate  Water is the best liquid to drink  · Strain your urine every time you go to the bathroom  Urinate through a strainer or a piece of thin cloth to catch the stones  Take the stones to your healthcare provider so they can be sent to the lab for tests  This will help your healthcare providers plan the best treatment for you  · Eat a variety of healthy foods  Healthy foods include fruits, vegetables, whole-grain breads, low-fat dairy products, beans, and fish  You may need to limit how much sodium (salt) or protein you eat  Ask for information about the best foods for you  · Stay active  Your stones may pass more easily by if you stay active  Ask about the best activities for you  After you pass your kidney stones:  Once you have passed your kidney stones, your healthcare provider may  order a 24-hour urine test  Results from a 24-hour urine test will help your healthcare provider plan ways to prevent more stones from forming  If you are told to do a 24-hour test, your healthcare provider will give you more instructions  © 2017 [facility] Information is for End User's use only and may not be sold, redistributed or otherwise used for commercial purposes  All illustrations and images included in CareNotes® are the copyrighted property of A D A M , Inc  or [employer]  The above information is an  only  It is not intended as medical advice for individual conditions or treatments  Talk to your doctor, nurse or pharmacist before following any medical regimen to see if it is safe and effective for you  Kidney Stones   WHAT YOU NEED TO KNOW:   What is a kidney stone? Kidney stones form in the urinary system when the water and waste in your urine are out of balance  When this happens, certain types of waste crystals separate from the urine  The crystals build up and form kidney stones   Kidney stones can be made of uric acid, calcium, phosphate, or oxalate crystals  You may have 1 or more kidney stones  What increases my risk for kidney stones? · You do not drink enough liquids (especially water) each day  · You have urinary tract infections often  · You follow a certain type of diet  For example, people who eat a diet high in meat or salt may be at higher risk for kidney stones  People who eat foods high in oxalate may also be at higher risk  Foods that are high in oxalate include nuts, chocolate, coffee, and green leafy vegetables  · You take certain medicines such as diuretics, steroids, and antacids  · A family member has had kidney stones  · You were born with a kidney or bowel disorder, or you have other medical problems such as gout  What are the signs and symptoms of kidney stones? · Pain in the middle of your back that moves across to your side or that may spread to your groin    · Nausea and vomiting    · Urge to urinate often, burning feeling when you urinate, or pink or red urine    · Tenderness in your lower back, side, or stomach  How are kidney stones diagnosed? Your healthcare provider will ask about your health, diet, and lifestyle  He may refer you to a urologist  Luanne Buchanan may need tests to find out what type of kidney stones you have  Tests can show the size of your kidney stones and where they are in your urinary system  You may have one or more of the following:  · Urine tests  may show if you have blood in your urine  They may also show high amounts of the substances that form kidney stones, such as uric acid  · Blood tests  show how well your kidneys are working  They may also be used to check the levels of calcium or uric acid in your blood  · A noncontrast helical CT scan  is a type of x-ray that uses a computer to take pictures of your kidneys  Healthcare providers use the pictures to check for kidney stones or other problems  · X-rays  of your kidneys, bladder, and ureters may be done   You may be given a dye before the pictures are taken to help healthcare providers see the pictures better  You may need to have more than one x-ray  Tell the healthcare provider if you have ever had an allergic reaction to contrast dye  · An abdominal ultrasound  uses sound waves to show pictures of your abdomen on a monitor  How are kidney stones treated? · NSAIDs , such as ibuprofen, help decrease swelling, pain, and fever  This medicine is available with or without a doctor's order  NSAIDs can cause stomach bleeding or kidney problems in certain people  If you take blood thinner medicine, always ask your healthcare provider if NSAIDs are safe for you  Always read the medicine label and follow directions  · Prescription medicine  may be given  Ask how to take this medicine safely  · Medicines  to balance your electrolytes may be needed  · A procedure or surgery  to remove the kidney stones may be needed if they do not pass on their own  Your treatment will depend on the size and location of your kidney stones  How can I manage my symptoms? · Drink plenty of liquids  Your healthcare provider may tell you to drink at least 8 to 12 (eight-ounce) cups of liquids each day  This helps flush out the kidney stones when you urinate  Water is the best liquid to drink  · Strain your urine every time you go to the bathroom  Urinate through a strainer or a piece of thin cloth to catch the stones  Take the stones to your healthcare provider so they can be sent to the lab for tests  This will help your healthcare providers plan the best treatment for you  · Eat a variety of healthy foods  Healthy foods include fruits, vegetables, whole-grain breads, low-fat dairy products, beans, and fish  You may need to limit how much sodium (salt) or protein you eat  Ask for information about the best foods for you  · Exercise regularly  Your stones may pass more easily if you stay active   Ask about the best activities for you  When should I seek immediate care? · You have vomiting that is not relieved by medicine  When should I contact my healthcare provider? · You have a fever  · You have trouble passing urine  · You see blood in your urine  · You have severe pain  · You have any questions or concerns about your condition or care  CARE AGREEMENT:   You have the right to help plan your care  Learn about your health condition and how it may be treated  Discuss treatment options with your caregivers to decide what care you want to receive  You always have the right to refuse treatment  The above information is an  only  It is not intended as medical advice for individual conditions or treatments  Talk to your doctor, nurse or pharmacist before following any medical regimen to see if it is safe and effective for you  © 2017 [address] [employer] Information is for End User's use only and may not be sold, redistributed or otherwise used for commercial purposes  All illustrations and images included in CareNotes® are the copyrighted property of A D A M , Inc  or [employer]  Flank Pain   WHAT YOU NEED TO KNOW:   Flank pain is felt in the area below your ribcage and above your hip bones, often in the lower back  Your pain may be dull or so severe that you cannot get comfortable  The pain may stay in one area or radiate to another area  It may worsen and lighten in waves  Flank pain is often a sign of problems with your urinary tract, such as a kidney stone or infection  DISCHARGE INSTRUCTIONS:   Return to the emergency department if:   · You have a fever  · Your heart is fluttering or jumping  · You see blood in your urine  · Your pain radiates into your lower abdomen and genital area  · You have intense pain in your low back next to your spine  · You are much more tired than usual and have no desire to eat       · You have a headache and your muscles jerk  Contact your healthcare provider if:   · You have an upset stomach and are vomiting  · You have to urinate more often, and with urgency  · Your pain worsens or does not improve, and you cannot get comfortable  · You pass a stone when you urinate  · You have questions or concerns about your condition or care  Medicines: The following medicines may be ordered for you:  · Pain medicine  may help decrease or relieve your pain  Do not wait until the pain is severe before you take your medicine  · Antibiotics  may help treat a urinary tract infection caused by bacteria  · Take your medicine as directed  Contact your healthcare provider if you think your medicine is not helping or if you have side effects  Tell him of her if you are allergic to any medicine  Keep a list of the medicines, vitamins, and herbs you take  Include the amounts, and when and why you take them  Bring the list or the pill bottles to follow-up visits  Carry your medicine list with you in case of an emergency  Follow up with your healthcare provider in 1 to 2 days or as directed:  Write down your questions so you remember to ask them during your visits  © 2017 [address] [employer] Information is for End User's use only and may not be sold, redistributed or otherwise used for commercial purposes  All illustrations and images included in CareNotes® are the copyrighted property of A [employer] M [employer] Inc  or Cheng Hunter  The above information is an  only  It is not intended as medical advice for individual conditions or treatments  Talk to your doctor, nurse or pharmacist before following any medical regimen to see if it is safe and effective for you

## 2018-12-19 ENCOUNTER — HOSPITAL ENCOUNTER (OUTPATIENT)
Dept: RADIOLOGY | Facility: HOSPITAL | Age: 53
Discharge: HOME | End: 2018-12-19
Attending: UROLOGY
Payer: MEDICARE

## 2018-12-19 ENCOUNTER — TRANSCRIBE ORDERS (OUTPATIENT)
Dept: ADMINISTRATIVE | Facility: HOSPITAL | Age: 53
End: 2018-12-19

## 2018-12-19 DIAGNOSIS — N20.0 RENAL STONE: ICD-10-CM

## 2018-12-19 DIAGNOSIS — N20.0 RENAL STONE: Primary | ICD-10-CM

## 2018-12-19 PROCEDURE — 74018 RADEX ABDOMEN 1 VIEW: CPT

## 2018-12-25 ENCOUNTER — APPOINTMENT (EMERGENCY)
Dept: RADIOLOGY | Facility: HOSPITAL | Age: 53
End: 2018-12-25
Payer: MEDICARE

## 2018-12-25 ENCOUNTER — HOSPITAL ENCOUNTER (EMERGENCY)
Facility: HOSPITAL | Age: 53
Discharge: HOME | End: 2018-12-25
Attending: EMERGENCY MEDICINE
Payer: MEDICARE

## 2018-12-25 VITALS
WEIGHT: 290 LBS | DIASTOLIC BLOOD PRESSURE: 66 MMHG | SYSTOLIC BLOOD PRESSURE: 158 MMHG | HEIGHT: 72 IN | OXYGEN SATURATION: 98 % | TEMPERATURE: 98.4 F | RESPIRATION RATE: 20 BRPM | HEART RATE: 88 BPM | BODY MASS INDEX: 39.28 KG/M2

## 2018-12-25 DIAGNOSIS — R31.9 HEMATURIA: ICD-10-CM

## 2018-12-25 DIAGNOSIS — N20.0 RENAL CALCULUS, LEFT: Primary | ICD-10-CM

## 2018-12-25 LAB
ALBUMIN SERPL BCP-MCNC: 4.1 G/DL (ref 3.5–5.7)
ALP SERPL-CCNC: 87 U/L (ref 40–150)
ALT SERPL W P-5'-P-CCNC: 20 U/L (ref 7–52)
ANION GAP SERPL CALCULATED.3IONS-SCNC: 7 MMOL/L (ref 4–13)
APTT PPP: 42 SECONDS (ref 26–38)
AST SERPL W P-5'-P-CCNC: 17 U/L (ref 13–39)
BACTERIA UR QL AUTO: ABNORMAL /HPF
BASOPHILS # BLD AUTO: 0.1 THOUSANDS/ΜL (ref 0–0.1)
BASOPHILS NFR BLD AUTO: 1 % (ref 0–2)
BILIRUB SERPL-MCNC: 0.5 MG/DL (ref 0.2–1)
BILIRUB UR QL STRIP: NEGATIVE
BUN SERPL-MCNC: 21 MG/DL (ref 7–25)
CALCIUM SERPL-MCNC: 8.9 MG/DL (ref 8.6–10.5)
CHLORIDE SERPL-SCNC: 105 MMOL/L (ref 98–107)
CLARITY UR: ABNORMAL
CO2 SERPL-SCNC: 26 MMOL/L (ref 21–31)
COLOR UR: YELLOW
CREAT SERPL-MCNC: 1.51 MG/DL (ref 0.7–1.3)
EOSINOPHIL # BLD AUTO: 0.4 THOUSAND/ΜL (ref 0–0.61)
EOSINOPHIL NFR BLD AUTO: 3 % (ref 0–5)
ERYTHROCYTE [DISTWIDTH] IN BLOOD BY AUTOMATED COUNT: 14 % (ref 11.5–14.5)
GFR SERPL CREATININE-BSD FRML MDRD: 52 ML/MIN/1.73SQ M
GLUCOSE SERPL-MCNC: 92 MG/DL (ref 65–99)
GLUCOSE UR STRIP-MCNC: NEGATIVE MG/DL
HCT VFR BLD AUTO: 45.6 % (ref 36.5–49.3)
HGB BLD-MCNC: 15.1 G/DL (ref 14–18)
HGB UR QL STRIP.AUTO: ABNORMAL
INR PPP: 1.14 (ref 0.9–1.5)
KETONES UR STRIP-MCNC: ABNORMAL MG/DL
LEUKOCYTE ESTERASE UR QL STRIP: ABNORMAL
LYMPHOCYTES # BLD AUTO: 2.2 THOUSANDS/ΜL (ref 0.6–4.47)
LYMPHOCYTES NFR BLD AUTO: 20 % (ref 21–51)
MCH RBC QN AUTO: 30.5 PG (ref 26–34)
MCHC RBC AUTO-ENTMCNC: 33.1 G/DL (ref 31–37)
MCV RBC AUTO: 92 FL (ref 81–99)
MONOCYTES # BLD AUTO: 1.1 THOUSAND/ΜL (ref 0.17–1.22)
MONOCYTES NFR BLD AUTO: 11 % (ref 2–12)
MUCOUS THREADS UR QL AUTO: ABNORMAL
NEUTROPHILS # BLD AUTO: 7 THOUSANDS/ΜL (ref 1.4–6.5)
NEUTS SEG NFR BLD AUTO: 65 % (ref 42–75)
NITRITE UR QL STRIP: NEGATIVE
NON-SQ EPI CELLS URNS QL MICRO: ABNORMAL /HPF
NRBC BLD AUTO-RTO: 0 /100 WBCS
PH UR STRIP.AUTO: 5.5 [PH] (ref 5–8)
PLATELET # BLD AUTO: 218 THOUSANDS/UL (ref 149–390)
PMV BLD AUTO: 8.3 FL (ref 8.6–11.7)
POTASSIUM SERPL-SCNC: 3.8 MMOL/L (ref 3.5–5.5)
PROT SERPL-MCNC: 7.4 G/DL (ref 6.4–8.9)
PROT UR STRIP-MCNC: ABNORMAL MG/DL
PROTHROMBIN TIME: 13.3 SECONDS (ref 10.2–13)
RBC # BLD AUTO: 4.96 MILLION/UL (ref 4.3–5.9)
RBC #/AREA URNS AUTO: ABNORMAL /HPF
SODIUM SERPL-SCNC: 138 MMOL/L (ref 134–143)
SP GR UR STRIP.AUTO: >=1.03 (ref 1–1.03)
UROBILINOGEN UR QL STRIP.AUTO: 0.2 E.U./DL
WBC # BLD AUTO: 10.7 THOUSAND/UL (ref 4.8–10.8)
WBC #/AREA URNS AUTO: ABNORMAL /HPF

## 2018-12-25 PROCEDURE — 85610 PROTHROMBIN TIME: CPT | Performed by: PHYSICIAN ASSISTANT

## 2018-12-25 PROCEDURE — 74018 RADEX ABDOMEN 1 VIEW: CPT

## 2018-12-25 PROCEDURE — 96376 TX/PRO/DX INJ SAME DRUG ADON: CPT

## 2018-12-25 PROCEDURE — 96361 HYDRATE IV INFUSION ADD-ON: CPT

## 2018-12-25 PROCEDURE — 96374 THER/PROPH/DIAG INJ IV PUSH: CPT

## 2018-12-25 PROCEDURE — 99284 EMERGENCY DEPT VISIT MOD MDM: CPT

## 2018-12-25 PROCEDURE — 81003 URINALYSIS AUTO W/O SCOPE: CPT | Performed by: PHYSICIAN ASSISTANT

## 2018-12-25 PROCEDURE — 81001 URINALYSIS AUTO W/SCOPE: CPT | Performed by: PHYSICIAN ASSISTANT

## 2018-12-25 PROCEDURE — 36415 COLL VENOUS BLD VENIPUNCTURE: CPT | Performed by: PHYSICIAN ASSISTANT

## 2018-12-25 PROCEDURE — 85730 THROMBOPLASTIN TIME PARTIAL: CPT | Performed by: PHYSICIAN ASSISTANT

## 2018-12-25 PROCEDURE — 85025 COMPLETE CBC W/AUTO DIFF WBC: CPT | Performed by: PHYSICIAN ASSISTANT

## 2018-12-25 PROCEDURE — 80053 COMPREHEN METABOLIC PANEL: CPT | Performed by: PHYSICIAN ASSISTANT

## 2018-12-25 RX ORDER — OXYBUTYNIN CHLORIDE 15 MG/1
15 TABLET, EXTENDED RELEASE ORAL
COMMUNITY
End: 2019-01-15

## 2018-12-25 RX ORDER — TAMSULOSIN HYDROCHLORIDE 0.4 MG/1
0.4 CAPSULE ORAL ONCE
Status: COMPLETED | OUTPATIENT
Start: 2018-12-25 | End: 2018-12-25

## 2018-12-25 RX ORDER — HYDROMORPHONE HCL/PF 1 MG/ML
1 SYRINGE (ML) INJECTION ONCE
Status: COMPLETED | OUTPATIENT
Start: 2018-12-25 | End: 2018-12-25

## 2018-12-25 RX ORDER — TAMSULOSIN HYDROCHLORIDE 0.4 MG/1
0.4 CAPSULE ORAL
Qty: 7 CAPSULE | Refills: 0 | Status: SHIPPED | OUTPATIENT
Start: 2018-12-25

## 2018-12-25 RX ORDER — OXYCODONE HYDROCHLORIDE AND ACETAMINOPHEN 5; 325 MG/1; MG/1
1 TABLET ORAL EVERY 4 HOURS PRN
Qty: 10 TABLET | Refills: 0 | Status: SHIPPED | OUTPATIENT
Start: 2018-12-25 | End: 2018-12-27

## 2018-12-25 RX ORDER — OXYBUTYNIN CHLORIDE 15 MG/1
15 TABLET, EXTENDED RELEASE ORAL
Qty: 15 TABLET | Refills: 0 | Status: SHIPPED | OUTPATIENT
Start: 2018-12-25 | End: 2019-01-15

## 2018-12-25 RX ADMIN — TAMSULOSIN HYDROCHLORIDE 0.4 MG: 0.4 CAPSULE ORAL at 14:13

## 2018-12-25 RX ADMIN — SODIUM CHLORIDE 1000 ML: 0.9 INJECTION, SOLUTION INTRAVENOUS at 15:49

## 2018-12-25 RX ADMIN — HYDROMORPHONE HYDROCHLORIDE 1 MG: 1 INJECTION, SOLUTION INTRAMUSCULAR; INTRAVENOUS; SUBCUTANEOUS at 14:12

## 2018-12-25 RX ADMIN — HYDROMORPHONE HYDROCHLORIDE 1 MG: 1 INJECTION, SOLUTION INTRAMUSCULAR; INTRAVENOUS; SUBCUTANEOUS at 15:05

## 2018-12-25 RX ADMIN — SODIUM CHLORIDE 1000 ML: 0.9 INJECTION, SOLUTION INTRAVENOUS at 14:11

## 2018-12-25 RX ADMIN — OXYBUTYNIN CHLORIDE 15 MG: 10 TABLET, EXTENDED RELEASE ORAL at 17:15

## 2018-12-25 NOTE — ED PROVIDER NOTES
History  Chief Complaint   Patient presents with    Abdominal Pain     left flank to left groin  began yesterday  known kidney stones     Patient is a 71-year-old male with a history of left-sided renal calculi most recently he had a left-sided kidney stone measuring 7 mm with a stent that was placed by Dr Laura Benitez at the end of November  He does follow with Dr Laura Benitez regularly he presents today via ambulance due to left-sided flank pain that radiates into his left abdomen down into his groin  He does feel nauseated but denies vomiting  He states that the pain started last night he denies aggravating or alleviating factors he denies fevers chills chest pain shortness of breath  Prior to Admission Medications   Prescriptions Last Dose Informant Patient Reported? Taking?    DULoxetine (CYMBALTA) 60 mg delayed release capsule   No No   Sig: Take 1 capsule (60 mg total) by mouth daily   cloNIDine (CATAPRES) 0 3 mg tablet   No No   Sig: Take 1 tablet (0 3 mg total) by mouth 2 (two) times a day for 14 days   furosemide (LASIX) 40 mg tablet   No No   Sig: Take 1 tablet (40 mg total) by mouth daily   Patient not taking: Reported on 10/27/2018    lisinopril (ZESTRIL) 40 mg tablet   Yes No   Sig: Take 40 mg by mouth daily   metoprolol tartrate (LOPRESSOR) 50 mg tablet   No No   Sig: Take 1 tablet (50 mg total) by mouth 2 (two) times a day for 14 days   nicotine (NICODERM CQ) 21 mg/24 hr TD 24 hr patch   No No   Sig: Place 1 patch on the skin daily   Patient not taking: Reported on 11/22/2018    oxybutynin (DITROPAN XL) 15 MG 24 hr tablet   Yes Yes   Sig: Take 15 mg by mouth daily at bedtime   pregabalin (LYRICA) 200 MG capsule   Yes No   Sig: Take 1 capsule by mouth 2 (two) times a day   tamsulosin (FLOMAX) 0 4 mg   Yes No   Sig: Take 1 capsule by mouth daily      Facility-Administered Medications: None       Past Medical History:   Diagnosis Date    Anxiety 5/2/2018    Arthritis     Chest pain     Last Assessed: 10/5/2016    Hypertension     Kidney stone     Obesity     Pleural effusion     Last Assessed: 9/15/2017    Pneumonia     Sciatica        Past Surgical History:   Procedure Laterality Date    CHEST TUBE INSERTION      Last Assessed: 9/15/2017    FL CYSTOGRAM  11/15/2018    HERNIA REPAIR  2011    LUMBAR LAMINECTOMY  2009    AR CYSTOURETHROSCOPY,URETER CATHETER Left 11/15/2018    Procedure: CYSTOSCOPY RETROGRADE PYELOGRAM WITH INSERTION STENT URETERAL;  Surgeon: Kamila Brooke MD;  Location[facility] MAIN OR;  Service: Urology    TONSILLECTOMY      Last Assessed: 9/15/2017       Family History   Problem Relation Age of Onset    Other Mother         Cardiac Disorder    Heart failure Mother     Diabetes Mother     Cancer Father     Liver cancer Father     Cancer Brother     Testicular cancer Brother     Lung cancer Brother     Liver cancer Family      I have reviewed and agree with the history as documented  Social History   Substance Use Topics    Smoking status: Current Every Day Smoker     Packs/day: 0 50     Types: Cigarettes    Smokeless tobacco: Never Used    Alcohol use No      Comment: Former consumption of alcohol        Review of Systems   Constitutional: Negative for chills, diaphoresis, fatigue and fever  HENT: Negative for congestion, ear pain, rhinorrhea, sneezing and sore throat  Respiratory: Negative for cough, shortness of breath, wheezing and stridor  Cardiovascular: Negative for chest pain, palpitations and leg swelling  Gastrointestinal: Positive for abdominal pain and nausea  Negative for abdominal distention, blood in stool, constipation, diarrhea and vomiting  Left flank pain   Genitourinary: Negative for difficulty urinating, dysuria, frequency, hematuria and urgency  Musculoskeletal: Negative for gait problem, myalgias and neck pain  Skin: Negative for rash     Neurological: Negative for dizziness, syncope, weakness, light-headedness and headaches  All other systems reviewed and are negative  Physical Exam  Physical Exam   Constitutional: He is oriented to person, place, and time  He appears well-developed and well-nourished  No distress  HENT:   Head: Normocephalic and atraumatic  Right Ear: External ear normal    Left Ear: External ear normal    Nose: Nose normal    Mouth/Throat: Oropharynx is clear and moist    Eyes: Pupils are equal, round, and reactive to light  Conjunctivae and EOM are normal    Neck: Normal range of motion  Neck supple  No thyromegaly present  Cardiovascular: Normal rate, regular rhythm and normal heart sounds  Exam reveals no gallop and no friction rub  No murmur heard  Pulmonary/Chest: Effort normal and breath sounds normal  No stridor  No respiratory distress  He has no wheezes  He has no rales  He exhibits no tenderness  Abdominal: Soft  Bowel sounds are normal  He exhibits no distension and no mass  There is no tenderness  There is no rebound and no guarding  No hernia  Musculoskeletal: He exhibits no edema, tenderness or deformity  Lymphadenopathy:     He has no cervical adenopathy  Neurological: He is alert and oriented to person, place, and time  Skin: Skin is warm and dry  No rash noted  He is not diaphoretic  No erythema  No pallor  Psychiatric: He has a normal mood and affect  His behavior is normal    Nursing note and vitals reviewed        Vital Signs  ED Triage Vitals [12/25/18 1332]   Temperature Pulse Respirations Blood Pressure SpO2   98 2 °F (36 8 °C) (!) 120 22 (!) 199/98 98 %      Temp Source Heart Rate Source Patient Position - Orthostatic VS BP Location FiO2 (%)   Temporal Monitor Lying Left arm --      Pain Score       Worst Possible Pain           Vitals:    12/25/18 1332 12/25/18 1716   BP: (!) 199/98 158/66   Pulse: (!) 120 88   Patient Position - Orthostatic VS: Lying        Visual Acuity      ED Medications  Medications    EMS REPLENISHMENT MED (not administered) sodium chloride 0 9 % bolus 1,000 mL (0 mL Intravenous Stopped 12/25/18 1543)   tamsulosin (FLOMAX) capsule 0 4 mg (0 4 mg Oral Given 12/25/18 1413)   HYDROmorphone (DILAUDID) injection 1 mg (1 mg Intravenous Given 12/25/18 1412)   HYDROmorphone (DILAUDID) injection 1 mg (1 mg Intravenous Given 12/25/18 1505)   sodium chloride 0 9 % bolus 1,000 mL (0 mL Intravenous Stopped 12/25/18 1716)   oxybutynin (DITROPAN-XL) 24 hr tablet 15 mg (15 mg Oral Given 12/25/18 1715)       Diagnostic Studies  Results Reviewed     Procedure Component Value Units Date/Time    Urine Microscopic [[id number]]  (Abnormal) Collected:  12/25/18 1548    Lab Status:  Final result Specimen:  Urine from Urine, Clean Catch Updated:  12/25/18 1601     RBC, UA Innumerable (A) /hpf      WBC, UA 2-4 (A) /hpf      Epithelial Cells Occasional /hpf      Bacteria, UA Occasional /hpf      MUCUS THREADS Occasional (A)    UA w Reflex to Microscopic w Reflex to Culture [[id number]]  (Abnormal) Collected:  12/25/18 1548    Lab Status:  Final result Specimen:  Urine from Urine, Clean Catch Updated:  12/25/18 1555     Color, UA Yellow     Clarity, UA Cloudy (A)     Specific Gravity, UA >=1 030 (H)     pH, UA 5 5     Leukocytes, UA 1+ (A)     Nitrite, UA Negative     Protein, UA 2+ (A) mg/dl      Glucose, UA Negative mg/dl      Ketones, UA Trace (A) mg/dl      Urobilinogen, UA 0 2 E U /dl      Bilirubin, UA Negative     Blood, UA 3+ (A)    Comprehensive metabolic panel [[id number]]  (Abnormal) Collected:  12/25/18 1417    Lab Status:  Final result Specimen:  Blood from Arm, Right Updated:  12/25/18 1455     Sodium 138 mmol/L      Potassium 3 8 mmol/L      Chloride 105 mmol/L      CO2 26 mmol/L      ANION GAP 7 mmol/L      BUN 21 mg/dL      Creatinine 1 51 (H) mg/dL      Glucose 92 mg/dL      Calcium 8 9 mg/dL      AST 17 U/L      ALT 20 U/L      Alkaline Phosphatase 87 U/L      Total Protein 7 4 g/dL      Albumin 4 1 g/dL      Total Bilirubin 0 50 mg/dL      eGFR 46 ml/min/1 73sq m     Narrative:         National Kidney Disease Education Program recommendations are as follows:  GFR calculation is accurate only with a steady state creatinine  Chronic Kidney disease less than 60 ml/min/1 73 sq  meters  Kidney failure less than 15 ml/min/1 73 sq  meters  CBC and differential [[id number]]  (Abnormal) Collected:  12/25/18 1417    Lab Status:  Final result Specimen:  Blood from Arm, Right Updated:  12/25/18 1441     WBC 10 70 Thousand/uL      RBC 4 96 Million/uL      Hemoglobin 15 1 g/dL      Hematocrit 45 6 %      MCV 92 fL      MCH 30 5 pg      MCHC 33 1 g/dL      RDW 14 0 %      MPV 8 3 (L) fL      Platelets 651 Thousands/uL      nRBC 0 /100 WBCs      Neutrophils Relative 65 %      Lymphocytes Relative 20 (L) %      Monocytes Relative 11 %      Eosinophils Relative 3 %      Basophils Relative 1 %      Neutrophils Absolute 7 00 (H) Thousands/µL      Lymphocytes Absolute 2 20 Thousands/µL      Monocytes Absolute 1 10 Thousand/µL      Eosinophils Absolute 0 40 Thousand/µL      Basophils Absolute 0 10 Thousands/µL     Protime-INR [[id number]]  (Abnormal) Collected:  12/25/18 1417    Lab Status:  Final result Specimen:  Blood from Arm, Right Updated:  12/25/18 1441     Protime 13 3 (H) seconds      INR 1 14    APTT [[id number]]  (Abnormal) Collected:  12/25/18 1417    Lab Status:  Final result Specimen:  Blood from Arm, Right Updated:  12/25/18 1441     PTT 42 (H) seconds                  XR abdomen 1 view kub   Final Result by Baylee Ferreira (12/25 1446)   Diffuse fecal material/constipation and bowel gas limits visibility  Left   ureteral stent  7 7 mm left renal calculus lower pole unchanged  Signed by Yisroel Canavan, MD                 Procedures  Procedures       Phone Contacts  ED Phone Contact    ED Course                               MDM  Number of Diagnoses or Management Options  Diagnosis management comments: Paged Dr Maria Luz Braun to discuss results       1635: Spoke w/  Verner Hover regarding results and states that is is common to have pain w/ a stent especially b/c they cause urgency and pt tend to urinate when they don't need too which causes increased that is transmitted into the stent and causes pain  In additon stents can cause hematuria, which he is not worried about  Pt Creat is bumped form last month form 1 08 to 1 51 d DR Verner Hover recommends repeat BMP in AM bladder scan and if < 100ml d/c to hoome on flomax and oxybuturin  Bladder scan = 34ml in ED  Pt is resetting comfortably in NAD pain is controlled no n/v, fevers, chills  Patient has remained clinically and hemodynamically stable throughout his ED course and is appropriate for discharge to home and outpatient follow-up return precautions and anticipatory guidance was discussed with patient he verbalized understanding  CritCare Time    Disposition  Final diagnoses:   Renal calculus, left   Hematuria     Time reflects when diagnosis was documented in both MDM as applicable and the Disposition within this note     Time User Action Codes Description Comment    12/25/2018  4:39 PM Ольга Bhagat Add [N20 0] Renal calculus, left     12/25/2018  4:39 PM Ольга Bhagat Add [R31 9] Hematuria       ED Disposition     ED Disposition Condition Comment    Discharge  Shanell Handy discharge to home/self care  Condition at discharge: Good        Follow-up Information     Follow up With Specialties Details Why Contact Info    Mark Marie MD Urology In 2 days  [address] [facility] [phone]            Discharge Medication List as of 12/25/2018  4:47 PM      START taking these medications    Details   !! oxybutynin (DITROPAN XL) 15 MG 24 hr tablet Take 1 tablet (15 mg total) by mouth daily at bedtime, Starting Tue 12/25/2018, Print      !! tamsulosin (FLOMAX) 0 4 mg Take 1 capsule (0 4 mg total) by mouth daily with dinner, Starting Tue 12/25/2018, Print       !! - Potential duplicate medications found   Please discuss with provider  CONTINUE these medications which have NOT CHANGED    Details   !! oxybutynin (DITROPAN XL) 15 MG 24 hr tablet Take 15 mg by mouth daily at bedtime, Historical Med      cloNIDine (CATAPRES) 0 3 mg tablet Take 1 tablet (0 3 mg total) by mouth 2 (two) times a day for 14 days, Starting Sat 10/27/2018, Until Sat 11/10/2018, Normal      DULoxetine (CYMBALTA) 60 mg delayed release capsule Take 1 capsule (60 mg total) by mouth daily, Starting Thu 6/21/2018, Normal      furosemide (LASIX) 40 mg tablet Take 1 tablet (40 mg total) by mouth daily, Starting Fri 5/4/2018, Normal      lisinopril (ZESTRIL) 40 mg tablet Take 40 mg by mouth daily, Historical Med      metoprolol tartrate (LOPRESSOR) 50 mg tablet Take 1 tablet (50 mg total) by mouth 2 (two) times a day for 14 days, Starting Sat 10/27/2018, Until Sat 11/10/2018, Normal      nicotine (NICODERM CQ) 21 mg/24 hr TD 24 hr patch Place 1 patch on the skin daily, Starting Sat 11/17/2018, Normal      pregabalin (LYRICA) 200 MG capsule Take 1 capsule by mouth 2 (two) times a day, Historical Med      !! tamsulosin (FLOMAX) 0 4 mg Take 1 capsule by mouth daily, Starting Mon 1/15/2018, Historical Med       !! - Potential duplicate medications found  Please discuss with provider  Outpatient Discharge Orders  Basic metabolic panel   Standing Status: Future  Standing Exp   Date: 12/25/19         ED Provider  Electronically Signed by           Debi Erwin PA-C  12/25/18 3193

## 2018-12-25 NOTE — DISCHARGE INSTRUCTIONS
Acute Hematuria   WHAT YOU SHOULD KNOW:   Hematuria is blood in your urine from an injury or medical condition  Acute means the problem starts suddenly, worsens quickly, and lasts a short time  Your urine may be bright red to dark brown  Some common causes of hematuria are bladder infection, kidney stone, trauma to the kidneys or bladder, and some medications  Sometimes blood clots can block the urethra so that you cannot empty your bladder  AFTER YOU LEAVE:   Medicines:  Ask about these or other medicines you may need to treat the cause of your acute hematuria:  · Antibiotics: This medicine is given to fight or prevent an infection caused by bacteria  Always take your antibiotics exactly as ordered by your healthcare provider  Do not stop taking your medicine unless directed by your healthcare provider  Never save antibiotics or take leftover antibiotics that were given to you for another illness  · Take your medicine as directed  Call your healthcare provider if you think your medicine is not helping or if you have side effects  Tell him if you are allergic to any medicine  Keep a list of the medicines, vitamins, and herbs you take  Include the amounts, and when and why you take them  Bring the list or the pill bottles to follow-up visits  Carry your medicine list with you in case of an emergency  Increase the amount of fluid you drink:  Drink clear fluids to help flush the blood from your urinary tract  Follow instructions about how much fluid to drink  Follow up with your healthcare provider as directed: Your healthcare provider will tell you how often to come in for follow-up visits  He may refer you to a specialist, such as a urologist or nephrologist  The specialists may do tests or procedures to find more serious problems with your urinary system  Write down your questions so you remember to ask them during your visits     Contact your healthcare provider if:   · You have a fever that gets worse or does not go away with treatment  · You cannot keep liquids or medicines down  · Your urine gets darker, even after you drink extra liquids  · You have questions or concerns about your condition, treatment, or care  Seek care immediately or call 911 if:   · You have blood in your urine after a new injury, such as a fall  · You are urinating very small amounts or not at all  · You feel like you cannot empty your bladder  · You have severe back or side pain that does not go away with treatment  © 2014 [address] [employer] is for End User's use only and may not be sold, redistributed or otherwise used for commercial purposes  All illustrations and images included in CareNotes® are the copyrighted property of [employer]  or [employer]  The above information is an  only  It is not intended as medical advice for individual conditions or treatments  Talk to your doctor, nurse or pharmacist before following any medical regimen to see if it is safe and effective for you

## 2018-12-26 ENCOUNTER — APPOINTMENT (OUTPATIENT)
Dept: LAB | Facility: HOSPITAL | Age: 53
End: 2018-12-26
Payer: MEDICARE

## 2018-12-26 DIAGNOSIS — N20.0 RENAL CALCULUS, LEFT: ICD-10-CM

## 2018-12-26 DIAGNOSIS — R31.9 HEMATURIA: ICD-10-CM

## 2018-12-26 LAB
ANION GAP SERPL CALCULATED.3IONS-SCNC: 5 MMOL/L (ref 4–13)
BUN SERPL-MCNC: 19 MG/DL (ref 7–25)
CALCIUM SERPL-MCNC: 9 MG/DL (ref 8.6–10.5)
CHLORIDE SERPL-SCNC: 103 MMOL/L (ref 98–107)
CO2 SERPL-SCNC: 30 MMOL/L (ref 21–31)
CREAT SERPL-MCNC: 1.26 MG/DL (ref 0.7–1.3)
GFR SERPL CREATININE-BSD FRML MDRD: 64 ML/MIN/1.73SQ M
GLUCOSE P FAST SERPL-MCNC: 95 MG/DL (ref 65–99)
POTASSIUM SERPL-SCNC: 3.8 MMOL/L (ref 3.5–5.5)
SODIUM SERPL-SCNC: 138 MMOL/L (ref 134–143)

## 2018-12-26 PROCEDURE — 80048 BASIC METABOLIC PNL TOTAL CA: CPT

## 2018-12-26 PROCEDURE — 36415 COLL VENOUS BLD VENIPUNCTURE: CPT

## 2019-01-14 ENCOUNTER — APPOINTMENT (EMERGENCY)
Dept: RADIOLOGY | Facility: HOSPITAL | Age: 54
End: 2019-01-14
Payer: MEDICARE

## 2019-01-14 ENCOUNTER — HOSPITAL ENCOUNTER (EMERGENCY)
Facility: HOSPITAL | Age: 54
Discharge: HOME | End: 2019-01-14
Attending: EMERGENCY MEDICINE
Payer: MEDICARE

## 2019-01-14 VITALS
BODY MASS INDEX: 40.63 KG/M2 | SYSTOLIC BLOOD PRESSURE: 182 MMHG | HEIGHT: 72 IN | RESPIRATION RATE: 30 BRPM | OXYGEN SATURATION: 95 % | TEMPERATURE: 99.1 F | DIASTOLIC BLOOD PRESSURE: 95 MMHG | WEIGHT: 300 LBS | HEART RATE: 66 BPM

## 2019-01-14 DIAGNOSIS — J45.901 EXACERBATION OF ASTHMA: Primary | ICD-10-CM

## 2019-01-14 DIAGNOSIS — I10 ACCELERATED HYPERTENSION: ICD-10-CM

## 2019-01-14 LAB
ANION GAP SERPL CALCULATED.3IONS-SCNC: 7 MMOL/L (ref 4–13)
BUN SERPL-MCNC: 17 MG/DL (ref 7–25)
CALCIUM SERPL-MCNC: 9.4 MG/DL (ref 8.6–10.5)
CHLORIDE SERPL-SCNC: 100 MMOL/L (ref 98–107)
CO2 SERPL-SCNC: 26 MMOL/L (ref 21–31)
CREAT SERPL-MCNC: 0.98 MG/DL (ref 0.7–1.3)
ERYTHROCYTE [DISTWIDTH] IN BLOOD BY AUTOMATED COUNT: 13.5 % (ref 11.5–14.5)
GFR SERPL CREATININE-BSD FRML MDRD: 87 ML/MIN/1.73SQ M
GLUCOSE SERPL-MCNC: 174 MG/DL (ref 65–99)
HCT VFR BLD AUTO: 41.9 % (ref 36.5–49.3)
HGB BLD-MCNC: 14.1 G/DL (ref 14–18)
LYMPHOCYTES # BLD AUTO: 0.48 THOUSAND/UL (ref 0.6–4.47)
LYMPHOCYTES # BLD AUTO: 3 % (ref 20–51)
MCH RBC QN AUTO: 30.2 PG (ref 26–34)
MCHC RBC AUTO-ENTMCNC: 33.7 G/DL (ref 31–37)
MCV RBC AUTO: 90 FL (ref 81–99)
MONOCYTES # BLD AUTO: 0.32 THOUSAND/UL (ref 0–1.22)
MONOCYTES NFR BLD AUTO: 2 % (ref 4–12)
NEUTS BAND NFR BLD MANUAL: 1 % (ref 0–8)
NEUTS SEG # BLD: 15.11 THOUSAND/UL (ref 1.81–6.82)
NEUTS SEG NFR BLD AUTO: 94 % (ref 42–75)
NRBC BLD AUTO-RTO: 0 /100 WBCS
PLATELET # BLD AUTO: 227 THOUSANDS/UL (ref 149–390)
PLATELET BLD QL SMEAR: ADEQUATE
PMV BLD AUTO: 8.2 FL (ref 8.6–11.7)
POTASSIUM SERPL-SCNC: 4.1 MMOL/L (ref 3.5–5.5)
RBC # BLD AUTO: 4.69 MILLION/UL (ref 4.3–5.9)
RBC MORPH BLD: NORMAL
SODIUM SERPL-SCNC: 133 MMOL/L (ref 134–143)
TOTAL CELLS COUNTED SPEC: 100
WBC # BLD AUTO: 15.9 THOUSAND/UL (ref 4.8–10.8)

## 2019-01-14 PROCEDURE — 36415 COLL VENOUS BLD VENIPUNCTURE: CPT | Performed by: EMERGENCY MEDICINE

## 2019-01-14 PROCEDURE — 99285 EMERGENCY DEPT VISIT HI MDM: CPT

## 2019-01-14 PROCEDURE — 80048 BASIC METABOLIC PNL TOTAL CA: CPT | Performed by: EMERGENCY MEDICINE

## 2019-01-14 PROCEDURE — 96376 TX/PRO/DX INJ SAME DRUG ADON: CPT

## 2019-01-14 PROCEDURE — 96375 TX/PRO/DX INJ NEW DRUG ADDON: CPT

## 2019-01-14 PROCEDURE — 85007 BL SMEAR W/DIFF WBC COUNT: CPT | Performed by: EMERGENCY MEDICINE

## 2019-01-14 PROCEDURE — 96374 THER/PROPH/DIAG INJ IV PUSH: CPT

## 2019-01-14 PROCEDURE — 71046 X-RAY EXAM CHEST 2 VIEWS: CPT

## 2019-01-14 PROCEDURE — 85027 COMPLETE CBC AUTOMATED: CPT | Performed by: EMERGENCY MEDICINE

## 2019-01-14 PROCEDURE — 94760 N-INVAS EAR/PLS OXIMETRY 1: CPT

## 2019-01-14 PROCEDURE — 94640 AIRWAY INHALATION TREATMENT: CPT

## 2019-01-14 RX ORDER — PREDNISONE 20 MG/1
40 TABLET ORAL ONCE
Status: COMPLETED | OUTPATIENT
Start: 2019-01-14 | End: 2019-01-14

## 2019-01-14 RX ORDER — HYDRALAZINE HYDROCHLORIDE 20 MG/ML
10 INJECTION INTRAMUSCULAR; INTRAVENOUS ONCE
Status: COMPLETED | OUTPATIENT
Start: 2019-01-14 | End: 2019-01-14

## 2019-01-14 RX ORDER — MORPHINE SULFATE 4 MG/ML
4 INJECTION, SOLUTION INTRAMUSCULAR; INTRAVENOUS ONCE
Status: COMPLETED | OUTPATIENT
Start: 2019-01-14 | End: 2019-01-14

## 2019-01-14 RX ORDER — LOSARTAN POTASSIUM 50 MG/1
50 TABLET ORAL DAILY
Qty: 14 TABLET | Refills: 0 | Status: SHIPPED | OUTPATIENT
Start: 2019-01-14 | End: 2019-01-15

## 2019-01-14 RX ORDER — IPRATROPIUM BROMIDE AND ALBUTEROL SULFATE 2.5; .5 MG/3ML; MG/3ML
3 SOLUTION RESPIRATORY (INHALATION) ONCE
Status: COMPLETED | OUTPATIENT
Start: 2019-01-14 | End: 2019-01-14

## 2019-01-14 RX ORDER — MORPHINE SULFATE 4 MG/ML
4 INJECTION, SOLUTION INTRAMUSCULAR; INTRAVENOUS ONCE
Status: DISCONTINUED | OUTPATIENT
Start: 2019-01-14 | End: 2019-01-14

## 2019-01-14 RX ADMIN — MORPHINE SULFATE 4 MG: 4 INJECTION INTRAVENOUS at 01:38

## 2019-01-14 RX ADMIN — PREDNISONE 40 MG: 20 TABLET ORAL at 00:58

## 2019-01-14 RX ADMIN — MORPHINE SULFATE 2 MG: 2 INJECTION, SOLUTION INTRAMUSCULAR; INTRAVENOUS at 03:11

## 2019-01-14 RX ADMIN — HYDRALAZINE HYDROCHLORIDE 10 MG: 20 INJECTION INTRAMUSCULAR; INTRAVENOUS at 03:12

## 2019-01-14 RX ADMIN — IPRATROPIUM BROMIDE AND ALBUTEROL SULFATE 3 ML: .5; 3 SOLUTION RESPIRATORY (INHALATION) at 00:57

## 2019-01-14 NOTE — DISCHARGE INSTRUCTIONS
PLEASE CONTINUE USUAL MEDICINES  IN ADDITION I PRESCRIBED A NEW BLOOD PRESSURE MEDICINE THAT SHOULD IMPROVE YOUR BLOOD PRESSURE CONTROL  TAKE IT EACH DAY IN THE MORNING  PLEASE SEE YOUR FAMILY DOCTOR WITHIN 2 DAYS FOR A BLOOD PRESSURE CHECK  AT THE TIME OF DISCHARGE YOUR   BLOOD PRESSURE /95  Asthma, Ambulatory Care   GENERAL INFORMATION:   Asthma  is a lung disease that makes breathing difficult  Chronic inflammation and reactions to triggers narrow the airways in your lungs  Asthma can become life-threatening if it is not managed  Common symptoms include the following:   · Coughing     · Wheezing     · Shortness of breath     · Chest tightness  Seek immediate care for the following symptoms:   · Severe shortness of breath    · Blue or gray lips or nails    · Skin around your neck and ribs pulls in with each breath    · Shortness of breath, even after you take your short-term medicine as directed     · Peak flow numbers in the red zone of your asthma action plan  Treatment for asthma  will depend on how severe it is  Medicine may decrease inflammation, open airways, and make it easier to breathe  Medicines may be inhaled, taken as a pill, or injected  Short-term medicines relieve your symptoms quickly  Long-term medicines are used to prevent future attacks  You may also need medicine to help control your allergies  Manage and prevent future asthma attacks:   · Follow your asthma action pan  This is a written plan that you and your healthcare provider create  It explains which medicine you need and when to change doses if necessary  It also explains how you can monitor symptoms and use a peak flow meter  The meter measures how well your lungs are working  · Manage other health conditions , such as allergies, acid reflux, and sleep apnea  · Identify and avoid triggers  These may include pets, dust mites, mold, and cockroaches  · Do not smoke and avoid others who smoke    If you smoke, it is never too late to quit  Ask your healthcare provider if you need help quitting  · Ask about a flu vaccine  The flu can make your asthma worse  You may need a yearly flu shot  Follow up with your healthcare provider as directed: You will need to return to make sure your medicine is working and your symptoms are controlled  You may be referred to an asthma or allergy specialist  Andrade Michaels may be asked to keep a record of your peak flow values and bring it with you to your appointments  Write down your questions so you remember to ask them during your visits  CARE AGREEMENT:   You have the right to help plan your care  Learn about your health condition and how it may be treated  Discuss treatment options with your caregivers to decide what care you want to receive  You always have the right to refuse treatment  The above information is an  only  It is not intended as medical advice for individual conditions or treatments  Talk to your doctor, nurse or pharmacist before following any medical regimen to see if it is safe and effective for you  © 2014 [address] is for End User's use only and may not be sold, redistributed or otherwise used for commercial purposes  All illustrations and images included in CareNotes® are the copyrighted property of [employer]  or Chengpamela Beaver[employer]lsa  Chronic Hypertension   WHAT YOU NEED TO KNOW:   Hypertension is high blood pressure (BP)  Your BP is the force of your blood moving against the walls of your arteries  Normal BP is less than 120/80  Prehypertension is between 120/80 and 139/89  Hypertension is 140/90 or higher  Hypertension causes your BP to get so high that your heart has to work much harder than normal  This can damage your heart  Chronic hypertension is a long-term condition that you can control with a healthy lifestyle or medicines   A controlled blood pressure helps protect your organs, such as your heart, lungs, brain, and kidneys  DISCHARGE INSTRUCTIONS:   Call 911 for any of the following:   · You have discomfort in your chest that feels like squeezing, pressure, fullness, or pain  · You become confused or have difficulty speaking  · You suddenly feel lightheaded or have trouble breathing  · You have pain or discomfort in your back, neck, jaw, stomach, or arm  Return to the emergency department if:   · You have a severe headache or vision loss  · You have weakness in an arm or leg  Contact your healthcare provider if:   · You feel faint, dizzy, confused, or drowsy  · You have been taking your BP medicine and your BP is still higher than your healthcare provider says it should be  · You have questions or concerns about your condition or care  Medicines: You may need any of the following:  · Medicine  may be used to help lower your BP  You may need more than one type of medicine  Take the medicine exactly as directed  · Diuretics  help decrease extra fluid that collects in your body  This will help lower your BP  You may urinate more often while you take this medicine  · Cholesterol medicine  helps lower your cholesterol level  A low cholesterol level helps prevent heart disease and makes it easier to control your blood pressure  · Take your medicine as directed  Contact your healthcare provider if you think your medicine is not helping or if you have side effects  Tell him or her if you are allergic to any medicine  Keep a list of the medicines, vitamins, and herbs you take  Include the amounts, and when and why you take them  Bring the list or the pill bottles to follow-up visits  Carry your medicine list with you in case of an emergency  Follow up with your healthcare provider as directed: You will need to return to have your blood pressure checked and to have other lab tests done  Write down your questions so you remember to ask them during your visits    Manage chronic hypertension:  Talk with your healthcare provider about these and other ways to manage hypertension:  · Take your BP at home  Sit and rest for 5 minutes before you take your BP  Extend your arm and support it on a flat surface  Your arm should be at the same level as your heart  Follow the directions that came with your BP monitor  If possible, take at least 2 BP readings each time  Take your BP at least twice a day at the same times each day, such as morning and evening  Keep a record of your BP readings and bring it to your follow-up visits  Ask your healthcare provider what your blood pressure should be  · Limit sodium (salt) as directed  Too much sodium can affect your fluid balance  Check labels to find low-sodium or no-salt-added foods  Some low-sodium foods use potassium salts for flavor  Too much potassium can also cause health problems  Your healthcare provider will tell you how much sodium and potassium are safe for you to have in a day  He or she may recommend that you limit sodium to 2,300 mg a day  · Follow the meal plan recommended by your healthcare provider  A dietitian or your provider can give you more information on low-sodium plans or the DASH (Dietary Approaches to Stop Hypertension) eating plan  The DASH plan is low in sodium, unhealthy fats, and total fat  It is high in potassium, calcium, and fiber  · Exercise to maintain a healthy weight  Exercise at least 30 minutes per day, on most days of the week  This will help decrease your blood pressure  Ask about the best exercise plan for you  · Decrease stress  This may help lower your BP  Learn ways to relax, such as deep breathing or listening to music  · Limit alcohol  Women should limit alcohol to 1 drink a day  Men should limit alcohol to 2 drinks a day  A drink of alcohol is 12 ounces of beer, 5 ounces of wine, or 1½ ounces of liquor  · Do not smoke    Nicotine and other chemicals in cigarettes and cigars can increase your BP and also cause lung damage  Ask your healthcare provider for information if you currently smoke and need help to quit  E-cigarettes or smokeless tobacco still contain nicotine  Talk to your healthcare provider before you use these products  © 2017 [address] Information is for End User's use only and may not be sold, redistributed or otherwise used for commercial purposes  All illustrations and images included in CareNotes® are the copyrighted property of [employer]  or Cheng Hunter  The above information is an  only  It is not intended as medical advice for individual conditions or treatments  Talk to your doctor, nurse or pharmacist before following any medical regimen to see if it is safe and effective for you

## 2019-01-14 NOTE — ED PROVIDER NOTES
History  Chief Complaint   Patient presents with    Shortness of Breath    Abdominal Pain     Pt has kidney stones since November (Dr Josy Childs)  Pt states this morning he began with LT ABD pain similar to his stone s/sx  Pt then began with SOB around 1500 that has been getting progressively worse  Pt states it is worse with exertion  Patient presents via EMS because of shortness of breath  He has been having shortness of breath, wheezing with since 3:00 p m  He denies any symptoms of a URI, but later noted that his family doctor said he had a touch of a chest cold last week  He did not receive any specific treatment or prescriptions  He denies fever chills  In addition, he mentions that he is having kidney stone pain on the left side of his abdomen  Prior to Admission Medications   Prescriptions Last Dose Informant Patient Reported? Taking?    DULoxetine (CYMBALTA) 60 mg delayed release capsule Not Taking at Unknown time  No No   Sig: Take 1 capsule (60 mg total) by mouth daily   Patient not taking: Reported on 1/14/2019    cloNIDine (CATAPRES) 0 3 mg tablet 1/14/2019 at Unknown time  No Yes   Sig: Take 1 tablet (0 3 mg total) by mouth 2 (two) times a day for 14 days   furosemide (LASIX) 40 mg tablet Not Taking at Unknown time  No No   Sig: Take 1 tablet (40 mg total) by mouth daily   Patient not taking: Reported on 10/27/2018    lisinopril (ZESTRIL) 40 mg tablet Not Taking at Unknown time  Yes No   Sig: Take 40 mg by mouth daily   metoprolol tartrate (LOPRESSOR) 50 mg tablet   No No   Sig: Take 1 tablet (50 mg total) by mouth 2 (two) times a day for 14 days   nicotine (NICODERM CQ) 21 mg/24 hr TD 24 hr patch Not Taking at Unknown time  No No   Sig: Place 1 patch on the skin daily   Patient not taking: Reported on 11/22/2018    oxybutynin (DITROPAN XL) 15 MG 24 hr tablet Not Taking at Unknown time  Yes No   Sig: Take 15 mg by mouth daily at bedtime   oxybutynin (DITROPAN XL) 15 MG 24 hr tablet Not Taking at Unknown time  No No   Sig: Take 1 tablet (15 mg total) by mouth daily at bedtime   Patient not taking: Reported on 1/14/2019    pregabalin (LYRICA) 200 MG capsule 1/14/2019 at Unknown time  Yes Yes   Sig: Take 1 capsule by mouth 2 (two) times a day   tamsulosin (FLOMAX) 0 4 mg 1/14/2019 at Unknown time  No Yes   Sig: Take 1 capsule (0 4 mg total) by mouth daily with dinner   Patient taking differently: Take 0 8 mg by mouth daily with dinner        Facility-Administered Medications: None       Past Medical History:   Diagnosis Date    Anxiety 5/2/2018    Arthritis     Chest pain     Last Assessed: 10/5/2016    Hypertension     Kidney stone     Obesity     Pleural effusion     Last Assessed: 9/15/2017    Pneumonia     Sciatica        Past Surgical History:   Procedure Laterality Date    CHEST TUBE INSERTION      Last Assessed: 9/15/2017    FL CYSTOGRAM  11/15/2018    HERNIA REPAIR  2011    LUMBAR LAMINECTOMY  2009    [place] CYSTOURETHROSCOPY,URETER CATHETER Left 11/15/2018    Procedure: CYSTOSCOPY RETROGRADE PYELOGRAM WITH INSERTION STENT URETERAL;  Surgeon: Venita Elizabeth MD;  Location: [facility] MAIN OR;  Service: Urology    TONSILLECTOMY      Last Assessed: 9/15/2017       Family History   Problem Relation Age of Onset    Other Mother         Cardiac Disorder    Heart failure Mother     Diabetes Mother     Cancer Father     Liver cancer Father     Cancer Brother     Testicular cancer Brother     Lung cancer Brother     Liver cancer Family      I have reviewed and agree with the history as documented  Social History   Substance Use Topics    Smoking status: Current Every Day Smoker     Packs/day: 1 00     Types: Cigarettes    Smokeless tobacco: Never Used    Alcohol use No      Comment: Former consumption of alcohol        Review of Systems   Constitutional: Negative for chills and fever  HENT: Negative for sinus pain, sinus pressure and sore throat      Respiratory: Positive for shortness of breath and wheezing  Negative for cough  Cardiovascular: Negative for chest pain and palpitations  Gastrointestinal: Positive for abdominal pain  Reports left-sided abdominal pain, from his kidney stone    Genitourinary: Negative for discharge and dysuria  Musculoskeletal: Negative for arthralgias and joint swelling  Skin: Negative for rash and wound  Neurological: Negative for dizziness, seizures and headaches  Physical Exam  Physical Exam   Constitutional: He is oriented to person, place, and time  He appears well-developed and well-nourished  ALTHOUGH THE PATIENT ARRIVES BY EMS, HE IS AMBULATORY  HE IS MODERATELY OBESE  MILDLY UNKEMPT  HE IS NOT TOXIC  HENT:   Head: Normocephalic and atraumatic  Right Ear: External ear normal    Left Ear: External ear normal    Mouth/Throat: Oropharynx is clear and moist    Eyes: Conjunctivae and EOM are normal  Right eye exhibits no discharge  Left eye exhibits no discharge  Neck: Normal range of motion  Cardiovascular: Normal rate, regular rhythm and normal heart sounds  No murmur heard  Pulmonary/Chest: Effort normal  He has wheezes  He exhibits no tenderness  Abdominal: Bowel sounds are normal  He exhibits no distension  There is no tenderness  Musculoskeletal: He exhibits no tenderness  Neurological: He is alert and oriented to person, place, and time  He displays normal reflexes  No cranial nerve deficit  Skin: Capillary refill takes less than 2 seconds  No rash noted  Vitals reviewed        Vital Signs  ED Triage Vitals [01/14/19 0030]   Temperature Pulse Respirations Blood Pressure SpO2   99 1 °F (37 3 °C) 78 (!) 30 (!) 206/101 99 %      Temp Source Heart Rate Source Patient Position - Orthostatic VS BP Location FiO2 (%)   Temporal Monitor -- Right arm --      Pain Score       8           Vitals:    01/14/19 0312 01/14/19 0315 01/14/19 0330 01/14/19 0345   BP: (!) 217/108 (!) 214/103 (!) 195/94 (!) 182/95 Pulse:  69 73 66       Visual Acuity      ED Medications  Medications   ipratropium-albuterol (DUO-NEB) 0 5-2 5 mg/3 mL inhalation solution 3 mL (3 mL Nebulization Given 1/14/19 0057)   predniSONE tablet 40 mg (40 mg Oral Given 1/14/19 0058)    EMS REPLENISHMENT MED ( Does not apply Given to EMS 1/14/19 0055)   morphine (PF) 4 mg/mL injection 4 mg (4 mg Intravenous Given 1/14/19 0138)   hydrALAZINE (APRESOLINE) injection 10 mg (10 mg Intravenous Given 1/14/19 0312)   morphine injection 2 mg (2 mg Intravenous Given 1/14/19 0311)       Diagnostic Studies  Results Reviewed     Procedure Component Value Units Date/Time    Basic metabolic panel [[id number]]  (Abnormal) Collected:  01/14/19 0315    Lab Status:  Final result Specimen:  Blood from Hand, Right Updated:  01/14/19 0340     Sodium 133 (L) mmol/L      Potassium 4 1 mmol/L      Chloride 100 mmol/L      CO2 26 mmol/L      ANION GAP 7 mmol/L      BUN 17 mg/dL      Creatinine 0 98 mg/dL      Glucose 174 (H) mg/dL      Calcium 9 4 mg/dL      eGFR 87 ml/min/1 73sq m     Narrative:         National Kidney Disease Education Program recommendations are as follows:  GFR calculation is accurate only with a steady state creatinine  Chronic Kidney disease less than 60 ml/min/1 73 sq  meters  Kidney failure less than 15 ml/min/1 73 sq  meters  CBC and differential [[id number]]  (Abnormal) Collected:  01/14/19 0315    Lab Status:  Final result Specimen:  Blood from Hand, Right Updated:  01/14/19 0323     WBC 15 90 (H) Thousand/uL      RBC 4 69 Million/uL      Hemoglobin 14 1 g/dL      Hematocrit 41 9 %      MCV 90 fL      MCH 30 2 pg      MCHC 33 7 g/dL      RDW 13 5 %      MPV 8 2 (L) fL      Platelets 954 Thousands/uL      nRBC 0 /100 WBCs                  XR chest 2 views   ED Interpretation by Ranjan Asher DO (01/14 0140)   I do not see any definitive changes that would indicate an acute process  Large gastric air bubble is evident  Procedures  Procedures       Phone Contacts  ED Phone Contact    ED Course     EXAMINATION AND EVALUATION  PATIENT PRESENTED WITH DYSPNEA, AND WHEEZING  HE IMPROVED WITH P O  PREDNISONE AND A DUONEB TREATMENT  HOWEVER  HE DISTAL E HAD LEFT-SIDED ABDOMINAL PAIN THAT WAS LIKELY RENAL COLIC FROM HIS LEFTPROXIMAL URETERAL STONE  IN ADDITION,  BECAME VERY HIGH  WHICH PERSISTED WITH RECHECKS OF HIS BLOOD PRESSURE  I DISCUSSED HIS COMPLIANCE WHICH HE ATTESTED TO     HE WAS GIVEN IV HYDRALAZINE WITH SIGNIFICANT IMPROVEMENT OF HIS BLOOD PRESSURE  HE WAS AMBULATORY WITHOUT ANY FURTHER DYSPNEA AND THEREFORE DISCHARGED HOME  UPON DISCHARGE HE WAS AMBULATING WITHOUT ANY VISIBLE DYSPNEA OR DISCOMFORT  HE WAS GIVEN A PRESCRIPTION OF LOSARTAN  HE VOICED SIDE EFFECTS OF INSOMNIA WITH LISINOPRIL, AND PALPITATIONS WITH NORVASC  HE WAS INSTRUCTED TO HAVE HIS BLOOD PRESSURE RECHECK THAT HIS FAMILY DOCTOR'S WITHIN 48 HOURS  MDM  CritCare Time    Disposition  Final diagnoses:   Exacerbation of asthma   Accelerated hypertension     Time reflects when diagnosis was documented in both MDM as applicable and the Disposition within this note     Time User Action Codes Description Comment    1/14/2019  3:54 AM Agusto Morales [Y39 [id number]] Exacerbation of asthma     1/14/2019  3:54 AM Agusto Morales [I10] Accelerated hypertension       ED Disposition     ED Disposition Condition Comment    Discharge  [place] discharge to home/self care      Condition at discharge: Stable        Follow-up Information     Follow up With Specialties Details Why [address], [address]   [address] Tammy Rojas, DO Family Medicine In 2 days BP CHECK [address]  [place] [address]  [phone]            Discharge Medication List as of 1/14/2019  4:02 AM      START taking these medications    Details losartan (COZAAR) 50 mg tablet Take 1 tablet (50 mg total) by mouth daily, Starting Mon 1/14/2019, Print         CONTINUE these medications which have NOT CHANGED    Details   cloNIDine (CATAPRES) 0 3 mg tablet Take 1 tablet (0 3 mg total) by mouth 2 (two) times a day for 14 days, Starting Sat 10/27/2018, Until Mon 1/14/2019, Normal      pregabalin (LYRICA) 200 MG capsule Take 1 capsule by mouth 2 (two) times a day, Historical Med      tamsulosin (FLOMAX) 0 4 mg Take 1 capsule (0 4 mg total) by mouth daily with dinner, Starting Tue 12/25/2018, Print      DULoxetine (CYMBALTA) 60 mg delayed release capsule Take 1 capsule (60 mg total) by mouth daily, Starting Thu 6/21/2018, Normal      furosemide (LASIX) 40 mg tablet Take 1 tablet (40 mg total) by mouth daily, Starting Fri 5/4/2018, Normal      lisinopril (ZESTRIL) 40 mg tablet Take 40 mg by mouth daily, Historical Med      metoprolol tartrate (LOPRESSOR) 50 mg tablet Take 1 tablet (50 mg total) by mouth 2 (two) times a day for 14 days, Starting Sat 10/27/2018, Until Sat 11/10/2018, Normal      nicotine (NICODERM CQ) 21 mg/24 hr TD 24 hr patch Place 1 patch on the skin daily, Starting Sat 11/17/2018, Normal      !! oxybutynin (DITROPAN XL) 15 MG 24 hr tablet Take 15 mg by mouth daily at bedtime, Historical Med      !! oxybutynin (DITROPAN XL) 15 MG 24 hr tablet Take 1 tablet (15 mg total) by mouth daily at bedtime, Starting Tue 12/25/2018, Print       !! - Potential duplicate medications found  Please discuss with provider  No discharge procedures on file      ED Provider  Electronically Signed by           Mary Henao DO  01/14/19 0941

## 2019-01-15 ENCOUNTER — APPOINTMENT (EMERGENCY)
Dept: RADIOLOGY | Facility: HOSPITAL | Age: 54
End: 2019-01-15
Payer: MEDICARE

## 2019-01-15 ENCOUNTER — HOSPITAL ENCOUNTER (EMERGENCY)
Facility: HOSPITAL | Age: 54
Discharge: HOME | End: 2019-01-15
Attending: EMERGENCY MEDICINE | Admitting: EMERGENCY MEDICINE
Payer: MEDICARE

## 2019-01-15 VITALS
DIASTOLIC BLOOD PRESSURE: 89 MMHG | HEART RATE: 79 BPM | SYSTOLIC BLOOD PRESSURE: 155 MMHG | TEMPERATURE: 97.7 F | BODY MASS INDEX: 41.99 KG/M2 | RESPIRATION RATE: 20 BRPM | OXYGEN SATURATION: 97 % | WEIGHT: 310 LBS | HEIGHT: 72 IN

## 2019-01-15 DIAGNOSIS — N20.1 LEFT URETERAL STONE: Chronic | ICD-10-CM

## 2019-01-15 DIAGNOSIS — N30.00 ACUTE CYSTITIS: ICD-10-CM

## 2019-01-15 DIAGNOSIS — I10 ESSENTIAL HYPERTENSION: Chronic | ICD-10-CM

## 2019-01-15 DIAGNOSIS — N23 RENAL COLIC ON LEFT SIDE: Primary | ICD-10-CM

## 2019-01-15 LAB
ALBUMIN SERPL BCP-MCNC: 3.9 G/DL (ref 3.5–5.7)
ALP SERPL-CCNC: 68 U/L (ref 40–150)
ALT SERPL W P-5'-P-CCNC: 16 U/L (ref 7–52)
ANION GAP SERPL CALCULATED.3IONS-SCNC: 6 MMOL/L (ref 4–13)
APTT PPP: 38 SECONDS (ref 26–38)
AST SERPL W P-5'-P-CCNC: 11 U/L (ref 13–39)
BACTERIA UR QL AUTO: ABNORMAL /HPF
BILIRUB SERPL-MCNC: 0.5 MG/DL (ref 0.2–1)
BILIRUB UR QL STRIP: NEGATIVE
BUN SERPL-MCNC: 19 MG/DL (ref 7–25)
CALCIUM SERPL-MCNC: 9.3 MG/DL (ref 8.6–10.5)
CHLORIDE SERPL-SCNC: 100 MMOL/L (ref 98–107)
CLARITY UR: ABNORMAL
CO2 SERPL-SCNC: 29 MMOL/L (ref 21–31)
COLOR UR: YELLOW
CREAT SERPL-MCNC: 0.99 MG/DL (ref 0.7–1.3)
ERYTHROCYTE [DISTWIDTH] IN BLOOD BY AUTOMATED COUNT: 13.4 % (ref 11.5–14.5)
GFR SERPL CREATININE-BSD FRML MDRD: 86 ML/MIN/1.73SQ M
GLUCOSE SERPL-MCNC: 121 MG/DL (ref 65–99)
GLUCOSE UR STRIP-MCNC: NEGATIVE MG/DL
HCT VFR BLD AUTO: 41.6 % (ref 36.5–49.3)
HGB BLD-MCNC: 13.5 G/DL (ref 14–18)
HGB UR QL STRIP.AUTO: ABNORMAL
INR PPP: 1.08 (ref 0.9–1.5)
KETONES UR STRIP-MCNC: NEGATIVE MG/DL
LEUKOCYTE ESTERASE UR QL STRIP: ABNORMAL
LYMPHOCYTES # BLD AUTO: 10 % (ref 20–51)
LYMPHOCYTES # BLD AUTO: 2.14 THOUSAND/UL (ref 0.6–4.47)
MCH RBC QN AUTO: 29.5 PG (ref 26–34)
MCHC RBC AUTO-ENTMCNC: 32.6 G/DL (ref 31–37)
MCV RBC AUTO: 91 FL (ref 81–99)
MONOCYTES # BLD AUTO: 1.07 THOUSAND/UL (ref 0–1.22)
MONOCYTES NFR BLD AUTO: 5 % (ref 4–12)
NEUTS SEG # BLD: 18.19 THOUSAND/UL (ref 1.81–6.82)
NEUTS SEG NFR BLD AUTO: 85 % (ref 42–75)
NITRITE UR QL STRIP: NEGATIVE
NON-SQ EPI CELLS URNS QL MICRO: ABNORMAL /HPF
NRBC BLD AUTO-RTO: 0 /100 WBCS
PH UR STRIP.AUTO: 6 [PH] (ref 5–8)
PLATELET # BLD AUTO: 271 THOUSANDS/UL (ref 149–390)
PLATELET BLD QL SMEAR: ADEQUATE
PMV BLD AUTO: 8.7 FL (ref 8.6–11.7)
POTASSIUM SERPL-SCNC: 3.6 MMOL/L (ref 3.5–5.5)
PROT SERPL-MCNC: 7 G/DL (ref 6.4–8.9)
PROT UR STRIP-MCNC: ABNORMAL MG/DL
PROTHROMBIN TIME: 12.5 SECONDS (ref 10.2–13)
RBC # BLD AUTO: 4.6 MILLION/UL (ref 4.3–5.9)
RBC #/AREA URNS AUTO: ABNORMAL /HPF
RBC MORPH BLD: NORMAL
SODIUM SERPL-SCNC: 135 MMOL/L (ref 134–143)
SP GR UR STRIP.AUTO: 1.02 (ref 1–1.03)
TOTAL CELLS COUNTED SPEC: 100
UROBILINOGEN UR QL STRIP.AUTO: 0.2 E.U./DL
WBC # BLD AUTO: 21.4 THOUSAND/UL (ref 4.8–10.8)
WBC #/AREA URNS AUTO: ABNORMAL /HPF

## 2019-01-15 PROCEDURE — 81001 URINALYSIS AUTO W/SCOPE: CPT | Performed by: PHYSICIAN ASSISTANT

## 2019-01-15 PROCEDURE — 96376 TX/PRO/DX INJ SAME DRUG ADON: CPT

## 2019-01-15 PROCEDURE — 96375 TX/PRO/DX INJ NEW DRUG ADDON: CPT

## 2019-01-15 PROCEDURE — 85027 COMPLETE CBC AUTOMATED: CPT | Performed by: PHYSICIAN ASSISTANT

## 2019-01-15 PROCEDURE — 74018 RADEX ABDOMEN 1 VIEW: CPT

## 2019-01-15 PROCEDURE — 85007 BL SMEAR W/DIFF WBC COUNT: CPT | Performed by: PHYSICIAN ASSISTANT

## 2019-01-15 PROCEDURE — 85610 PROTHROMBIN TIME: CPT | Performed by: PHYSICIAN ASSISTANT

## 2019-01-15 PROCEDURE — 96365 THER/PROPH/DIAG IV INF INIT: CPT

## 2019-01-15 PROCEDURE — 99284 EMERGENCY DEPT VISIT MOD MDM: CPT

## 2019-01-15 PROCEDURE — 36415 COLL VENOUS BLD VENIPUNCTURE: CPT | Performed by: PHYSICIAN ASSISTANT

## 2019-01-15 PROCEDURE — 85730 THROMBOPLASTIN TIME PARTIAL: CPT | Performed by: PHYSICIAN ASSISTANT

## 2019-01-15 PROCEDURE — 80053 COMPREHEN METABOLIC PANEL: CPT | Performed by: PHYSICIAN ASSISTANT

## 2019-01-15 RX ORDER — CEPHALEXIN 500 MG/1
500 CAPSULE ORAL EVERY 12 HOURS SCHEDULED
Qty: 20 CAPSULE | Refills: 0 | Status: SHIPPED | OUTPATIENT
Start: 2019-01-15 | End: 2019-01-25

## 2019-01-15 RX ORDER — OXYCODONE HYDROCHLORIDE AND ACETAMINOPHEN 5; 325 MG/1; MG/1
TABLET ORAL
COMMUNITY

## 2019-01-15 RX ORDER — CEFTRIAXONE 1 G/50ML
1000 INJECTION, SOLUTION INTRAVENOUS ONCE
Status: COMPLETED | OUTPATIENT
Start: 2019-01-15 | End: 2019-01-15

## 2019-01-15 RX ORDER — ONDANSETRON 2 MG/ML
4 INJECTION INTRAMUSCULAR; INTRAVENOUS ONCE
Status: COMPLETED | OUTPATIENT
Start: 2019-01-15 | End: 2019-01-15

## 2019-01-15 RX ORDER — HYDROMORPHONE HCL/PF 1 MG/ML
1 SYRINGE (ML) INJECTION ONCE
Status: COMPLETED | OUTPATIENT
Start: 2019-01-15 | End: 2019-01-15

## 2019-01-15 RX ORDER — HYDROMORPHONE HCL/PF 1 MG/ML
0.5 SYRINGE (ML) INJECTION ONCE
Status: COMPLETED | OUTPATIENT
Start: 2019-01-15 | End: 2019-01-15

## 2019-01-15 RX ORDER — ONDANSETRON 4 MG/1
4 TABLET, ORALLY DISINTEGRATING ORAL EVERY 8 HOURS PRN
Qty: 20 TABLET | Refills: 0 | Status: SHIPPED | OUTPATIENT
Start: 2019-01-15

## 2019-01-15 RX ADMIN — CEFTRIAXONE 1000 MG: 1 INJECTION, SOLUTION INTRAVENOUS at 12:23

## 2019-01-15 RX ADMIN — ONDANSETRON 4 MG: 2 INJECTION INTRAMUSCULAR; INTRAVENOUS at 12:06

## 2019-01-15 RX ADMIN — HYDROMORPHONE HYDROCHLORIDE 1 MG: 1 INJECTION, SOLUTION INTRAMUSCULAR; INTRAVENOUS; SUBCUTANEOUS at 12:06

## 2019-01-15 RX ADMIN — HYDROMORPHONE HYDROCHLORIDE 0.5 MG: 1 INJECTION, SOLUTION INTRAMUSCULAR; INTRAVENOUS; SUBCUTANEOUS at 13:36

## 2019-01-15 RX ADMIN — SODIUM CHLORIDE 1000 ML: 0.9 INJECTION, SOLUTION INTRAVENOUS at 12:06

## 2019-01-15 NOTE — ED PROVIDER NOTES
History  Chief Complaint   Patient presents with    Flank Pain     since november got worse today, pt has kidney stone had stent placed in november       History provided by:  Patient   used: No    Flank Pain   Pain location:  L flank  Pain quality: aching and sharp    Pain radiates to:  LLQ  Pain severity:  Severe (10/10 at worst)  Onset quality:  Sudden  Duration:  1 day  Timing:  Intermittent  Progression:  Waxing and waning  Chronicity:  Recurrent  Context: not alcohol use    Context comment:  Patient has a known left renal stone 7 mm diagnosed in November stent placed  Plan for lithotripsy on Thursday in 2 days  However home Percocet he states is not working as pain increased yesterday  Relieved by:  Nothing  Worsened by:  Nothing  Ineffective treatments: narcotics, percocet not helping over the past 1 day  Associated symptoms: hematuria and nausea    Associated symptoms: no chest pain, no chills, no constipation, no cough, no diarrhea, no dysuria, no fatigue, no fever, no shortness of breath, no sore throat and no vomiting    Nausea:     Severity:  Mild    Duration:  1 day    Timing:  Intermittent    Progression:  Waxing and waning  Risk factors: obesity and recent hospitalization    Risk factors comment:  Known left renal stone  nausea with increased pain  Allergies   Allergen Reactions    Codeine Itching    Suboxone [Buprenorphine Hcl-Naloxone Hcl]      Dug self apart    Toradol [Ketorolac Tromethamine]      Itching and hives           Prior to Admission Medications   Prescriptions Last Dose Informant Patient Reported? Taking?    cloNIDine (CATAPRES) 0 3 mg tablet   No No   Sig: Take 1 tablet (0 3 mg total) by mouth 2 (two) times a day for 14 days   metoprolol tartrate (LOPRESSOR) 50 mg tablet   No No   Sig: Take 1 tablet (50 mg total) by mouth 2 (two) times a day for 14 days   oxyCODONE-acetaminophen (PERCOCET) 5-325 mg per tablet   Yes No   Sig: oxycodone/acetaminophen 5-325 mg tabs   pregabalin (LYRICA) 200 MG capsule   Yes No   Sig: Take 1 capsule by mouth 2 (two) times a day   tamsulosin (FLOMAX) 0 4 mg   No No   Sig: Take 1 capsule (0 4 mg total) by mouth daily with dinner   Patient taking differently: Take 0 8 mg by mouth daily with dinner        Facility-Administered Medications: None       Past Medical History:   Diagnosis Date    Anxiety 5/2/2018    Arthritis     Chest pain     Last Assessed: 10/5/2016    Hypertension     Kidney stone     Obesity     Pleural effusion     Last Assessed: 9/15/2017    Pneumonia     Sciatica        Past Surgical History:   Procedure Laterality Date    CHEST TUBE INSERTION      Last Assessed: 9/15/2017    FL CYSTOGRAM  11/15/2018    HERNIA REPAIR  2011    LUMBAR LAMINECTOMY  2009    IN CYSTOURETHROSCOPY,URETER CATHETER Left 11/15/2018    Procedure: CYSTOSCOPY RETROGRADE PYELOGRAM WITH INSERTION STENT URETERAL;  Surgeon: Bertha Donis MD;  Location: [address] [place] [address] OR;  Service: Urology    TONSILLECTOMY      Last Assessed: 9/15/2017       Family History   Problem Relation Age of Onset    Other Mother         Cardiac Disorder    Heart failure Mother     Diabetes Mother     Cancer Father     Liver cancer Father     Cancer Brother     Testicular cancer Brother     Lung cancer Brother     Liver cancer Family      I have reviewed and agree with the history as documented  Social History   Substance Use Topics    Smoking status: Current Every Day Smoker     Packs/day: 1 00     Types: Cigarettes    Smokeless tobacco: Never Used    Alcohol use No      Comment: Former consumption of alcohol        Review of Systems   Constitutional: Negative for chills, diaphoresis, fatigue and fever  HENT: Negative for congestion, ear pain, rhinorrhea, sneezing and sore throat  Respiratory: Negative for cough, shortness of breath, wheezing and stridor  Cardiovascular: Negative for chest pain, palpitations and leg swelling     Gastrointestinal: Positive for abdominal pain (flank pain) and nausea  Negative for abdominal distention, blood in stool, constipation, diarrhea and vomiting  Genitourinary: Positive for flank pain and hematuria  Negative for difficulty urinating, dysuria, frequency and urgency  Musculoskeletal: Negative for gait problem, myalgias and neck pain  Skin: Negative for rash  Neurological: Negative for dizziness, syncope, weakness, light-headedness and headaches  All other systems reviewed and are negative  Physical Exam  Physical Exam   Constitutional: He is oriented to person, place, and time  He appears well-developed and well-nourished  HENT:   Head: Normocephalic and atraumatic  Eyes: Pupils are equal, round, and reactive to light  Conjunctivae and EOM are normal    Neck: Normal range of motion  Neck supple  No tracheal deviation present  Cardiovascular: Normal rate, regular rhythm, normal heart sounds and intact distal pulses  No murmur heard  Pulmonary/Chest: Effort normal and breath sounds normal  No respiratory distress  He has no wheezes  He has no rales  Abdominal: Soft  Bowel sounds are normal  He exhibits no distension and no mass  There is tenderness in the left lower quadrant  There is CVA tenderness (left)  There is negative Richey's sign  Musculoskeletal: Normal range of motion  He exhibits no edema or tenderness  Neurological: He is alert and oriented to person, place, and time  Skin: Skin is warm and dry  No rash noted  No erythema  Nursing note and vitals reviewed        Vital Signs  ED Triage Vitals   Temperature Pulse Respirations Blood Pressure SpO2   01/15/19 1122 01/15/19 1122 01/15/19 1122 01/15/19 1128 01/15/19 1122   (!) 45 9 °F (7 7 °C) 70 20 (!) 219/120 97 %      Temp src Heart Rate Source Patient Position - Orthostatic VS BP Location FiO2 (%)   -- -- -- -- --             Pain Score       01/15/19 1122       Worst Possible Pain           Vitals:    01/15/19 1122 01/15/19 1128 01/15/19 1221 01/15/19 1503   BP:  (!) 219/120 164/93 155/89   Pulse: 70  77 79       Visual Acuity      ED Medications  Medications   sodium chloride 0 9 % bolus 1,000 mL (0 mL Intravenous Stopped 1/15/19 1302)   ondansetron (ZOFRAN) injection 4 mg (4 mg Intravenous Given 1/15/19 1206)   HYDROmorphone (DILAUDID) injection 1 mg (1 mg Intravenous Given 1/15/19 1206)   cefTRIAXone (ROCEPHIN) IVPB (premix) 1,000 mg (0 mg Intravenous Stopped 1/15/19 1302)   HYDROmorphone (DILAUDID) injection 0 5 mg (0 5 mg Intravenous Given 1/15/19 1336)       Diagnostic Studies  Results Reviewed     Procedure Component Value Units Date/Time    Urine Microscopic [[id number]]  (Abnormal) Collected:  01/15/19 1302    Lab Status:  Final result Specimen:  Urine from Urine, Clean Catch Updated:  01/15/19 1336     RBC, UA Innumerable (A) /hpf      WBC, UA 4-10 (A) /hpf      Epithelial Cells Occasional /hpf      Bacteria, UA Occasional /hpf     UA w Reflex to Microscopic w Reflex to Culture [[id number]]  (Abnormal) Collected:  01/15/19 1302    Lab Status:  Final result Specimen:  Urine from Urine, Clean Catch Updated:  01/15/19 1310     Color, UA Yellow     Clarity, UA Slightly Cloudy (A)     Specific Gravity, UA 1 020     pH, UA 6 0     Leukocytes, UA 1+ (A)     Nitrite, UA Negative     Protein, UA Trace (A) mg/dl      Glucose, UA Negative mg/dl      Ketones, UA Negative mg/dl      Urobilinogen, UA 0 2 E U /dl      Bilirubin, UA Negative     Blood, UA 3+ (A)    Comprehensive metabolic panel [[id number]]  (Abnormal) Collected:  01/15/19 1206    Lab Status:  Final result Specimen:  Blood from Arm, Right Updated:  01/15/19 1259     Sodium 135 mmol/L      Potassium 3 6 mmol/L      Chloride 100 mmol/L      CO2 29 mmol/L      ANION GAP 6 mmol/L      BUN 19 mg/dL      Creatinine 0 99 mg/dL      Glucose 121 (H) mg/dL      Calcium 9 3 mg/dL      AST 11 (L) U/L      ALT 16 U/L      Alkaline Phosphatase 68 U/L      Total Protein 7 0 g/dL      Albumin 3 9 g/dL      Total Bilirubin 0 50 mg/dL      eGFR 86 ml/min/1 73sq m     Narrative:         National Kidney Disease Education Program recommendations are as follows:  GFR calculation is accurate only with a steady state creatinine  Chronic Kidney disease less than 60 ml/min/1 73 sq  meters  Kidney failure less than 15 ml/min/1 73 sq  meters  Protime-INR [[id number]]  (Normal) Collected:  01/15/19 1206    Lab Status:  Final result Specimen:  Blood from Arm, Right Updated:  01/15/19 1241     Protime 12 5 seconds      INR 1 08    APTT [[id number]]  (Normal) Collected:  01/15/19 1206    Lab Status:  Final result Specimen:  Blood from Arm, Right Updated:  01/15/19 1241     PTT 38 seconds     CBC and differential [[id number]]  (Abnormal) Collected:  01/15/19 1206    Lab Status:  Final result Specimen:  Blood from Arm, Right Updated:  01/15/19 1224     WBC 21 40 (H) Thousand/uL      RBC 4 60 Million/uL      Hemoglobin 13 5 (L) g/dL      Hematocrit 41 6 %      MCV 91 fL      MCH 29 5 pg      MCHC 32 6 g/dL      RDW 13 4 %      MPV 8 7 fL      Platelets 299 Thousands/uL      nRBC 0 /100 WBCs                  XR abdomen 1 view kub   Final Result by María Vasquez MD (01/15 1417)      Left ureteral stent appears in similar position to the prior study  7 mm left renal calculus, also similar to prior study         Workstation performed: [id number]                    Procedures  Procedures       Phone Contacts  ED Phone Contact    ED Course  ED Course as of Babak 15 1513   Tue Abbak 15, 2019   1231 Call placed to Dr Estrellita Rodriguez, discussed patient advised by Dr Estrellita Rodriguez that his office has been trying to contact this patient and has been unsuccessful and had to send a certified letter  Dr Estrellita Rodriguez advised he is not in fact planned for a lithotripsy on Thursday    Recommendation was to increase his Flomax to 0 8 mg daily provide oxybutynin if he is having any urgency and advise follow-up with Dr Estrellita Rodriguez in the office tomorrow, and no pain meds on discharge since he seems to be getting them from several different providers instead of calling Dr Telma Cleary office directly  1244 Upon questioning patient further he states he went to [facility] in the past 2 weeks as he was in such severe pain in has now been set up with Urology at [facility] is planned for lithotripsy with Dr Ishan Lopez this Thursday and does not wish to see Dr Whitney Tanner from this point further  1245 Patient white count elevated however of note patient has been on steroids for the past several days  Will treat with antibiotics for covering infection of the suspect mostly leukocytosis secondary to steroids  1512 PDMP queried, patient received 40 pills of Percocet on January 8, 2019  Will not prescribe any additional narcotics at this time patient has a planned lithotripsy on Thursday he needs to call his urologist for more medication if he feels he runs out before Thursday                                  MDM  Number of Diagnoses or Management Options  Acute cystitis: new and requires workup  Essential hypertension:   Left ureteral stone 7mm s/p stent: established and worsening  Renal colic on left side: established and worsening     Amount and/or Complexity of Data Reviewed  Clinical lab tests: ordered and reviewed  Tests in the radiology section of CPT®: ordered and reviewed  Independent visualization of images, tracings, or specimens: yes    Risk of Complications, Morbidity, and/or Mortality  Presenting problems: moderate  Diagnostic procedures: moderate  Management options: moderate    Patient Progress  Patient progress: stable    CritCare Time    Disposition  Final diagnoses:   Renal colic on left side   Left ureteral stone 7mm s/p stent   Acute cystitis   Essential hypertension     Time reflects when diagnosis was documented in both MDM as applicable and the Disposition within this note     Time User Action Codes Description Comment    1/15/2019  2:11 PM Ligia Barrett Landon Rodriguez Add [T18] Renal colic on left side     1/15/2019  2:11 PM Derek ROUSE Add [N20 1] Left ureteral stone     1/15/2019  2:11 PM Derek ROUSE Modify [N20 1] Left ureteral stone 7mm s/p stent     1/15/2019  2:11 PM Brian Diaz Add [N30 00] Acute cystitis     1/15/2019  2:11 PM Wood, [address] Essential hypertension       ED Disposition     ED Disposition Condition Comment    Discharge  Olena Hendrickson discharge to home/self care  Condition at discharge: Stable        Follow-up Information     Follow up With Specialties Details Why Contact Info    Elidia Richards MD Urology Go to appt on Thursday for scheduled lithotripsy  [address]  [employer]  [address]  [phone]            Patient's Medications   Discharge Prescriptions    CEPHALEXIN (KEFLEX) 500 MG CAPSULE    Take 1 capsule (500 mg total) by mouth every 12 (twelve) hours for 10 days       Start Date: 1/15/2019 End Date: 1/25/2019       Order Dose: 500 mg       Quantity: 20 capsule    Refills: 0    ONDANSETRON (ZOFRAN-ODT) 4 MG DISINTEGRATING TABLET    Take 1 tablet (4 mg total) by mouth every 8 (eight) hours as needed for nausea or vomiting       Start Date: 1/15/2019 End Date: --       Order Dose: 4 mg       Quantity: 20 tablet    Refills: 0     No discharge procedures on file      ED Provider  Electronically Signed by           Juhi Casper PA-C  01/15/19 0279

## 2019-01-15 NOTE — DISCHARGE INSTRUCTIONS
Renal Colic   WHAT YOU NEED TO KNOW:   Renal colic is severe pain in your lower back or sides  The pain is usually on one side, but may be on both sides of your lower back  Renal colic may start quickly, come and go, and become worse over time  Renal colic is caused by a blockage in your urinary tract  The most common cause of a blockage is a kidney stone  Blood clots, ureter spasms, and dead tissue may also block your urinary tract  DISCHARGE INSTRUCTIONS:   Return to the emergency department if:   · You cannot stop vomiting  · You see new or increased bleeding when you urinate  · You are urinating less than usual, or not at all  · Your pain is not getting better even after treatment  Contact your healthcare provider if:   · You have fever  · You need to urinate more often than usual, or right away  · You see a stone in your urine strainer after you urinate  · You have questions or concerns about your condition or care  Medicines:   · Medicines  may help decrease pain and muscle spasms  You may also need medicine to calm your stomach and stop vomiting  · Take your medicine as directed  Contact your healthcare provider if you think your medicine is not helping or if you have side effects  Tell him of her if you are allergic to any medicine  Keep a list of the medicines, vitamins, and herbs you take  Include the amounts, and when and why you take them  Bring the list or the pill bottles to follow-up visits  Carry your medicine list with you in case of an emergency  Manage your symptoms:   · Drink liquids as directed  to help decrease pain and flush blockages from your urinary tract  Ask how much liquid to drink each day and which liquids are best for you  You may need to drink about 3 liters (12 glasses) of liquids each day  Half of your total daily liquids should be water  Limit coffee, tea, and soda to 2 cups daily  Your urine should be pale and clear      · Strain your urine every time you urinate  Urinate into a strainer (funnel with a fine mesh on the bottom) or glass jar to collect kidney stones  Give the kidney stones to your healthcare provider at your next visit  · Eat a variety of healthy foods  Healthy foods include fruits, vegetables, whole-grain breads, low-fat dairy products, beans, lean meats, and fish  You may need to increase the amount of citrus fruit you eat, such as oranges  Ask your healthcare provider how much salt, calcium, and protein you should eat  · Avoid activity in hot temperatures  Heat may cause you to become dehydrated and urinate less  Follow up with your healthcare provider as directed: You may need to return for tests to check if your blockage has cleared  Write down your questions so you remember to ask them during your visits  © 2017 [address] [employer] Information is for End User's use only and may not be sold, redistributed or otherwise used for commercial purposes  All illustrations and images included in CareNotes® are the copyrighted property of [employer]  or [employer]  The above information is an  only  It is not intended as medical advice for individual conditions or treatments  Talk to your doctor, nurse or pharmacist before following any medical regimen to see if it is safe and effective for you

## 2019-01-18 ENCOUNTER — APPOINTMENT (EMERGENCY)
Dept: CT IMAGING | Facility: HOSPITAL | Age: 54
End: 2019-01-18
Payer: MEDICARE

## 2019-01-18 ENCOUNTER — HOSPITAL ENCOUNTER (EMERGENCY)
Facility: HOSPITAL | Age: 54
Discharge: HOME | End: 2019-01-18
Attending: EMERGENCY MEDICINE
Payer: MEDICARE

## 2019-01-18 VITALS
WEIGHT: 310 LBS | BODY MASS INDEX: 41.99 KG/M2 | TEMPERATURE: 98.5 F | SYSTOLIC BLOOD PRESSURE: 218 MMHG | HEIGHT: 72 IN | OXYGEN SATURATION: 98 % | RESPIRATION RATE: 18 BRPM | DIASTOLIC BLOOD PRESSURE: 125 MMHG | HEART RATE: 68 BPM

## 2019-01-18 DIAGNOSIS — N39.0 URINARY TRACT INFECTION: Primary | ICD-10-CM

## 2019-01-18 DIAGNOSIS — N20.1 URETEROLITHIASIS: ICD-10-CM

## 2019-01-18 LAB
ALBUMIN SERPL BCP-MCNC: 4.1 G/DL (ref 3.5–5.7)
ALP SERPL-CCNC: 75 U/L (ref 40–150)
ALT SERPL W P-5'-P-CCNC: 23 U/L (ref 7–52)
ANION GAP SERPL CALCULATED.3IONS-SCNC: 7 MMOL/L (ref 4–13)
APTT PPP: 36 SECONDS (ref 26–38)
AST SERPL W P-5'-P-CCNC: 13 U/L (ref 13–39)
BACTERIA UR QL AUTO: ABNORMAL /HPF
BASOPHILS # BLD AUTO: 0.1 THOUSANDS/ΜL (ref 0–0.1)
BASOPHILS NFR BLD AUTO: 1 % (ref 0–2)
BILIRUB SERPL-MCNC: 0.5 MG/DL (ref 0.2–1)
BILIRUB UR QL STRIP: NEGATIVE
BUN SERPL-MCNC: 18 MG/DL (ref 7–25)
CALCIUM SERPL-MCNC: 9.6 MG/DL (ref 8.6–10.5)
CHLORIDE SERPL-SCNC: 99 MMOL/L (ref 98–107)
CLARITY UR: CLEAR
CO2 SERPL-SCNC: 30 MMOL/L (ref 21–31)
COLOR UR: YELLOW
CREAT SERPL-MCNC: 1 MG/DL (ref 0.7–1.3)
EOSINOPHIL # BLD AUTO: 0.1 THOUSAND/ΜL (ref 0–0.61)
EOSINOPHIL NFR BLD AUTO: 1 % (ref 0–5)
ERYTHROCYTE [DISTWIDTH] IN BLOOD BY AUTOMATED COUNT: 13.4 % (ref 11.5–14.5)
GFR SERPL CREATININE-BSD FRML MDRD: 85 ML/MIN/1.73SQ M
GLUCOSE SERPL-MCNC: 137 MG/DL (ref 65–99)
GLUCOSE UR STRIP-MCNC: NEGATIVE MG/DL
HCT VFR BLD AUTO: 40.9 % (ref 36.5–49.3)
HGB BLD-MCNC: 13.5 G/DL (ref 14–18)
HGB UR QL STRIP.AUTO: ABNORMAL
INR PPP: 1.03 (ref 0.9–1.5)
KETONES UR STRIP-MCNC: NEGATIVE MG/DL
LEUKOCYTE ESTERASE UR QL STRIP: ABNORMAL
LIPASE SERPL-CCNC: 17 U/L (ref 11–82)
LYMPHOCYTES # BLD AUTO: 1.5 THOUSANDS/ΜL (ref 0.6–4.47)
LYMPHOCYTES NFR BLD AUTO: 8 % (ref 21–51)
MCH RBC QN AUTO: 29.6 PG (ref 26–34)
MCHC RBC AUTO-ENTMCNC: 33.1 G/DL (ref 31–37)
MCV RBC AUTO: 90 FL (ref 81–99)
MONOCYTES # BLD AUTO: 1.2 THOUSAND/ΜL (ref 0.17–1.22)
MONOCYTES NFR BLD AUTO: 6 % (ref 2–12)
NEUTROPHILS # BLD AUTO: 16 THOUSANDS/ΜL (ref 1.4–6.5)
NEUTS SEG NFR BLD AUTO: 85 % (ref 42–75)
NITRITE UR QL STRIP: POSITIVE
NON-SQ EPI CELLS URNS QL MICRO: ABNORMAL /HPF
NRBC BLD AUTO-RTO: 0 /100 WBCS
PH UR STRIP.AUTO: 6.5 [PH] (ref 5–8)
PLATELET # BLD AUTO: 292 THOUSANDS/UL (ref 149–390)
PMV BLD AUTO: 8.7 FL (ref 8.6–11.7)
POTASSIUM SERPL-SCNC: 3.9 MMOL/L (ref 3.5–5.5)
PROT SERPL-MCNC: 7.4 G/DL (ref 6.4–8.9)
PROT UR STRIP-MCNC: ABNORMAL MG/DL
PROTHROMBIN TIME: 12 SECONDS (ref 10.2–13)
RBC # BLD AUTO: 4.56 MILLION/UL (ref 4.3–5.9)
RBC #/AREA URNS AUTO: ABNORMAL /HPF
SODIUM SERPL-SCNC: 136 MMOL/L (ref 134–143)
SP GR UR STRIP.AUTO: 1.01 (ref 1–1.03)
UROBILINOGEN UR QL STRIP.AUTO: 0.2 E.U./DL
WBC # BLD AUTO: 18.9 THOUSAND/UL (ref 4.8–10.8)
WBC #/AREA URNS AUTO: ABNORMAL /HPF

## 2019-01-18 PROCEDURE — 96361 HYDRATE IV INFUSION ADD-ON: CPT

## 2019-01-18 PROCEDURE — 99284 EMERGENCY DEPT VISIT MOD MDM: CPT

## 2019-01-18 PROCEDURE — 81001 URINALYSIS AUTO W/SCOPE: CPT | Performed by: EMERGENCY MEDICINE

## 2019-01-18 PROCEDURE — 85730 THROMBOPLASTIN TIME PARTIAL: CPT | Performed by: EMERGENCY MEDICINE

## 2019-01-18 PROCEDURE — 96375 TX/PRO/DX INJ NEW DRUG ADDON: CPT

## 2019-01-18 PROCEDURE — 83690 ASSAY OF LIPASE: CPT | Performed by: EMERGENCY MEDICINE

## 2019-01-18 PROCEDURE — 80053 COMPREHEN METABOLIC PANEL: CPT | Performed by: EMERGENCY MEDICINE

## 2019-01-18 PROCEDURE — 74176 CT ABD & PELVIS W/O CONTRAST: CPT

## 2019-01-18 PROCEDURE — 96374 THER/PROPH/DIAG INJ IV PUSH: CPT

## 2019-01-18 PROCEDURE — 85610 PROTHROMBIN TIME: CPT | Performed by: EMERGENCY MEDICINE

## 2019-01-18 PROCEDURE — 85025 COMPLETE CBC W/AUTO DIFF WBC: CPT | Performed by: EMERGENCY MEDICINE

## 2019-01-18 PROCEDURE — 36415 COLL VENOUS BLD VENIPUNCTURE: CPT | Performed by: EMERGENCY MEDICINE

## 2019-01-18 RX ORDER — ONDANSETRON 2 MG/ML
4 INJECTION INTRAMUSCULAR; INTRAVENOUS ONCE
Status: COMPLETED | OUTPATIENT
Start: 2019-01-18 | End: 2019-01-18

## 2019-01-18 RX ORDER — CIPROFLOXACIN 500 MG/1
500 TABLET, FILM COATED ORAL ONCE
Status: COMPLETED | OUTPATIENT
Start: 2019-01-18 | End: 2019-01-18

## 2019-01-18 RX ORDER — HYDROMORPHONE HCL/PF 1 MG/ML
1 SYRINGE (ML) INJECTION ONCE
Status: COMPLETED | OUTPATIENT
Start: 2019-01-18 | End: 2019-01-18

## 2019-01-18 RX ORDER — FENTANYL CITRATE 50 UG/ML
100 INJECTION, SOLUTION INTRAMUSCULAR; INTRAVENOUS ONCE
Status: COMPLETED | OUTPATIENT
Start: 2019-01-18 | End: 2019-01-18

## 2019-01-18 RX ORDER — CIPROFLOXACIN 500 MG/1
500 TABLET, FILM COATED ORAL EVERY 12 HOURS SCHEDULED
Qty: 14 TABLET | Refills: 0 | Status: SHIPPED | OUTPATIENT
Start: 2019-01-18 | End: 2019-01-25

## 2019-01-18 RX ORDER — OXYCODONE HYDROCHLORIDE AND ACETAMINOPHEN 5; 325 MG/1; MG/1
1 TABLET ORAL EVERY 4 HOURS PRN
Qty: 10 TABLET | Refills: 0 | Status: SHIPPED | OUTPATIENT
Start: 2019-01-18

## 2019-01-18 RX ADMIN — CIPROFLOXACIN HYDROCHLORIDE 500 MG: 500 TABLET, FILM COATED ORAL at 15:48

## 2019-01-18 RX ADMIN — SODIUM CHLORIDE 1000 ML: 0.9 INJECTION, SOLUTION INTRAVENOUS at 14:09

## 2019-01-18 RX ADMIN — HYDROMORPHONE HYDROCHLORIDE 1 MG: 1 INJECTION, SOLUTION INTRAMUSCULAR; INTRAVENOUS; SUBCUTANEOUS at 15:48

## 2019-01-18 RX ADMIN — ONDANSETRON 4 MG: 2 INJECTION INTRAMUSCULAR; INTRAVENOUS at 14:15

## 2019-01-18 RX ADMIN — FENTANYL CITRATE 100 MCG: 50 INJECTION INTRAMUSCULAR; INTRAVENOUS at 14:11

## 2019-01-18 NOTE — ED PROVIDER NOTES
History  Chief Complaint   Patient presents with    Urinary Frequency     had procedure yesterday for a kidney stone  States He has frequent urination since then     Patient is a 48-year-old male history of kidney stones and stent put in by Dr Tremaine Mcarthur several weeks ago and yesterday had cystoscopy with laser lithotripsy on the left since has been having increased urinary frequency and left flank pain worsening  History provided by:  Patient  Flank Pain   Pain location:  L flank  Pain quality: aching and cramping    Pain severity:  Moderate  Onset quality:  Sudden  Duration:  1 day  Timing:  Constant  Progression:  Worsening  Chronicity:  Recurrent  Associated symptoms: dysuria, hematuria and nausea    Associated symptoms: no chest pain, no chills, no constipation, no cough, no diarrhea, no fatigue, no fever, no shortness of breath, no sore throat and no vomiting        Prior to Admission Medications   Prescriptions Last Dose Informant Patient Reported? Taking?    cephalexin (KEFLEX) 500 mg capsule   No No   Sig: Take 1 capsule (500 mg total) by mouth every 12 (twelve) hours for 10 days   cloNIDine (CATAPRES) 0 3 mg tablet   No No   Sig: Take 1 tablet (0 3 mg total) by mouth 2 (two) times a day for 14 days   metoprolol tartrate (LOPRESSOR) 50 mg tablet   No No   Sig: Take 1 tablet (50 mg total) by mouth 2 (two) times a day for 14 days   ondansetron (ZOFRAN-ODT) 4 mg disintegrating tablet   No No   Sig: Take 1 tablet (4 mg total) by mouth every 8 (eight) hours as needed for nausea or vomiting   oxyCODONE-acetaminophen (PERCOCET) 5-325 mg per tablet   Yes No   Sig: oxycodone/acetaminophen 5-325 mg tabs   pregabalin (LYRICA) 200 MG capsule   Yes No   Sig: Take 1 capsule by mouth 2 (two) times a day   tamsulosin (FLOMAX) 0 4 mg   No No   Sig: Take 1 capsule (0 4 mg total) by mouth daily with dinner   Patient taking differently: Take 0 8 mg by mouth daily with dinner        Facility-Administered Medications: None Past Medical History:   Diagnosis Date    Anxiety 5/2/2018    Arthritis     Chest pain     Last Assessed: 10/5/2016    Hypertension     Kidney stone     Obesity     Pleural effusion     Last Assessed: 9/15/2017    Pneumonia     Sciatica        Past Surgical History:   Procedure Laterality Date    CHEST TUBE INSERTION      Last Assessed: 9/15/2017    FL CYSTOGRAM  11/15/2018    HERNIA REPAIR  2011    LUMBAR LAMINECTOMY  2009    VT CYSTOURETHROSCOPY,URETER CATHETER Left 11/15/2018    Procedure: CYSTOSCOPY RETROGRADE PYELOGRAM WITH INSERTION STENT URETERAL;  Surgeon: Erendira Jewell MD;  Location: [facility] MAIN OR;  Service: Urology    TONSILLECTOMY      Last Assessed: 9/15/2017       Family History   Problem Relation Age of Onset    Other Mother         Cardiac Disorder    Heart failure Mother     Diabetes Mother     Cancer Father     Liver cancer Father     Cancer Brother     Testicular cancer Brother     Lung cancer Brother     Liver cancer Family      I have reviewed and agree with the history as documented  Social History   Substance Use Topics    Smoking status: Current Every Day Smoker     Packs/day: 1 00     Types: Cigarettes    Smokeless tobacco: Never Used    Alcohol use No      Comment: Former consumption of alcohol        Review of Systems   Constitutional: Negative for activity change, appetite change, chills, fatigue and fever  HENT: Negative for congestion, ear pain, rhinorrhea and sore throat  Eyes: Negative for discharge, redness and visual disturbance  Respiratory: Negative for cough, chest tightness, shortness of breath and wheezing  Cardiovascular: Negative for chest pain and palpitations  Gastrointestinal: Positive for nausea  Negative for abdominal pain, constipation, diarrhea and vomiting  Endocrine: Negative for polydipsia and polyuria  Genitourinary: Positive for dysuria, flank pain, frequency, hematuria and urgency  Negative for difficulty urinating  Musculoskeletal: Negative for arthralgias and myalgias  Skin: Negative for color change, pallor and rash  Neurological: Negative for dizziness, weakness, light-headedness, numbness and headaches  Hematological: Negative for adenopathy  Does not bruise/bleed easily  All other systems reviewed and are negative  Physical Exam  Physical Exam   Constitutional: He is oriented to person, place, and time  He appears well-developed and well-nourished  HENT:   Head: Normocephalic and atraumatic  Right Ear: External ear normal    Left Ear: External ear normal    Nose: Nose normal    Mouth/Throat: Oropharynx is clear and moist    Eyes: Pupils are equal, round, and reactive to light  Conjunctivae and EOM are normal    Neck: Normal range of motion  Neck supple  Cardiovascular: Normal rate, regular rhythm, normal heart sounds and intact distal pulses  Pulmonary/Chest: Effort normal and breath sounds normal  No respiratory distress  He has no wheezes  He has no rales  He exhibits no tenderness  Abdominal: Soft  Bowel sounds are normal  He exhibits no distension  There is tenderness in the suprapubic area, left upper quadrant and left lower quadrant  There is CVA tenderness  There is no rigidity, no rebound and no guarding  Musculoskeletal: Normal range of motion  Neurological: He is alert and oriented to person, place, and time  No cranial nerve deficit or sensory deficit  Skin: Skin is warm and dry  Psychiatric: He has a normal mood and affect  Nursing note and vitals reviewed        Vital Signs  ED Triage Vitals   Temperature Pulse Respirations Blood Pressure SpO2   01/18/19 1341 01/18/19 1341 01/18/19 1341 01/18/19 1343 01/18/19 1341   98 5 °F (36 9 °C) 84 16 (!) 236/125 95 %      Temp Source Heart Rate Source Patient Position - Orthostatic VS BP Location FiO2 (%)   01/18/19 1341 01/18/19 1341 01/18/19 1343 01/18/19 1343 --   Temporal Monitor Sitting Left arm       Pain Score       01/18/19 1341       Worst Possible Pain           Vitals:    01/18/19 1341 01/18/19 1343 01/18/19 1552   BP:  (!) 236/125 (!) 224/112   Pulse: 84  66   Patient Position - Orthostatic VS:  Sitting Lying       Visual Acuity      ED Medications  Medications   sodium chloride 0 9 % bolus 1,000 mL (1,000 mL Intravenous New Bag 1/18/19 1409)   ondansetron (ZOFRAN) injection 4 mg (4 mg Intravenous Given 1/18/19 1415)   fentanyl citrate (PF) 100 MCG/2ML 100 mcg (100 mcg Intravenous Given 1/18/19 1411)   HYDROmorphone (DILAUDID) injection 1 mg (1 mg Intravenous Given 1/18/19 1548)   ciprofloxacin (CIPRO) tablet 500 mg (500 mg Oral Given 1/18/19 1548)       Diagnostic Studies  Results Reviewed     Procedure Component Value Units Date/Time    Urine Microscopic [[id number]]  (Abnormal) Collected:  01/18/19 1526    Lab Status:  Final result Specimen:  Urine from Urine, Clean Catch Updated:  01/18/19 1555     RBC, UA 20-30 (A) /hpf      WBC, UA 0-5 /hpf      Epithelial Cells None Seen /hpf      Bacteria, UA None Seen /hpf     UA w Reflex to Microscopic w Reflex to Culture [[id number]]  (Abnormal) Collected:  01/18/19 1526    Lab Status:  Final result Specimen:  Urine from Urine, Clean Catch Updated:  01/18/19 1534     Color, UA Yellow     Clarity, UA Clear     Specific Gravity, UA 1 015     pH, UA 6 5     Leukocytes, UA Trace (A)     Nitrite, UA Positive (A)     Protein, UA Trace (A) mg/dl      Glucose, UA Negative mg/dl      Ketones, UA Negative mg/dl      Urobilinogen, UA 0 2 E U /dl      Bilirubin, UA Negative     Blood, UA 3+ (A)    Comprehensive metabolic panel [[id number]]  (Abnormal) Collected:  01/18/19 1407    Lab Status:  Final result Specimen:  Blood from Hand, Right Updated:  01/18/19 1454     Sodium 136 mmol/L      Potassium 3 9 mmol/L      Chloride 99 mmol/L      CO2 30 mmol/L      ANION GAP 7 mmol/L      BUN 18 mg/dL      Creatinine 1 00 mg/dL      Glucose 137 (H) mg/dL      Calcium 9 6 mg/dL      AST 13 U/L      ALT 23 U/L Alkaline Phosphatase 75 U/L      Total Protein 7 4 g/dL      Albumin 4 1 g/dL      Total Bilirubin 0 50 mg/dL      eGFR 85 ml/min/1 73sq m     Narrative:         National Kidney Disease Education Program recommendations are as follows:  GFR calculation is accurate only with a steady state creatinine  Chronic Kidney disease less than 60 ml/min/1 73 sq  meters  Kidney failure less than 15 ml/min/1 73 sq  meters  Lipase [[id number]]  (Normal) Collected:  01/18/19 1407    Lab Status:  Final result Specimen:  Blood from Hand, Right Updated:  01/18/19 1454     Lipase 17 u/L     APTT [[id number]]  (Normal) Collected:  01/18/19 1407    Lab Status:  Final result Specimen:  Blood from Hand, Right Updated:  01/18/19 1439     PTT 36 seconds     Protime-INR [[id number]]  (Normal) Collected:  01/18/19 1407    Lab Status:  Final result Specimen:  Blood from Hand, Right Updated:  01/18/19 1439     Protime 12 0 seconds      INR 1 03    CBC and differential [[id number]]  (Abnormal) Collected:  01/18/19 1407    Lab Status:  Final result Specimen:  Blood from Hand, Right Updated:  01/18/19 1436     WBC 18 90 (H) Thousand/uL      RBC 4 56 Million/uL      Hemoglobin 13 5 (L) g/dL      Hematocrit 40 9 %      MCV 90 fL      MCH 29 6 pg      MCHC 33 1 g/dL      RDW 13 4 %      MPV 8 7 fL      Platelets 224 Thousands/uL      nRBC 0 /100 WBCs      Neutrophils Relative 85 (H) %      Lymphocytes Relative 8 (L) %      Monocytes Relative 6 %      Eosinophils Relative 1 %      Basophils Relative 1 %      Neutrophils Absolute 16 00 (H) Thousands/µL      Lymphocytes Absolute 1 50 Thousands/µL      Monocytes Absolute 1 20 Thousand/µL      Eosinophils Absolute 0 10 Thousand/µL      Basophils Absolute 0 10 Thousands/µL                  CT abdomen pelvis wo contrast   Final Result by Tyson Noble MD (01/18 1533)      Interval fragmentation of left renal calculus  Left ureteral stent present without hydronephrosis                 Workstation performed: [id number]                    Procedures  Procedures       Phone Contacts  ED Phone Contact    ED Course                               MDM  Number of Diagnoses or Management Options  Ureterolithiasis: new and requires workup  Urinary tract infection: new and requires workup  Diagnosis management comments: Patient remained stable in the emergency department clinically and hemodynamically pain was improved no evidence of acute hypertensive urgency or emergency in the emergency department blood pressure improved with pain control  Advised Cipro for suspected urinary tract infection in the postoperative setting for 7 days advised prompt follow-up with his urologist for re-evaluation and obtain test results return precautions and anticipatory guidance discussed           Amount and/or Complexity of Data Reviewed  Clinical lab tests: ordered and reviewed  Tests in the radiology section of CPT®: ordered and reviewed  Tests in the medicine section of CPT®: ordered and reviewed  Decide to obtain previous medical records or to obtain history from someone other than the patient: yes  Obtain history from someone other than the patient: yes  Review and summarize past medical records: yes  Discuss the patient with other providers: yes  Independent visualization of images, tracings, or specimens: yes    Risk of Complications, Morbidity, and/or Mortality  Presenting problems: moderate  Management options: moderate    Patient Progress  Patient progress: stable    CritCare Time    Disposition  Final diagnoses:   Urinary tract infection   Ureterolithiasis - Without hydronephrosis status post recent lithotripsy     Time reflects when diagnosis was documented in both MDM as applicable and the Disposition within this note     Time User Action Codes Description Comment    1/18/2019  3:42 PM Serena Ramesh Add [N39 0] Urinary tract infection     1/18/2019  3:42 PM Connor De Leon Add [N20 1] Ureterolithiasis     1/18/2019  3:42 PM Connor De Leon Modify [N20 1] Ureterolithiasis Without hydronephrosis status post recent lithotripsy      ED Disposition     ED Disposition Condition Comment    Discharge  Juni Fraga discharge to home/self care  Condition at discharge: Stable        Follow-up Information     Follow up With Specialties Details Why Contact Info    Toni Orellana MD Urology Call in 1 day  [address] [place] [address]      Ember Houston DO Family Medicine Schedule an appointment as soon as possible for a visit in 3 days  [address]            Patient's Medications   Discharge Prescriptions    CIPROFLOXACIN (CIPRO) 500 MG TABLET    Take 1 tablet (500 mg total) by mouth every 12 (twelve) hours for 7 days       Start Date: 1/18/2019 End Date: 1/25/2019       Order Dose: 500 mg       Quantity: 14 tablet    Refills: 0    OXYCODONE-ACETAMINOPHEN (PERCOCET) 5-325 MG PER TABLET    Take 1 tablet by mouth every 4 (four) hours as needed for moderate pain for up to 10 doses Max Daily Amount: 6 tablets       Start Date: 1/18/2019 End Date: --       Order Dose: 1 tablet       Quantity: 10 tablet    Refills: 0     No discharge procedures on file      ED Provider  Electronically Signed by           Jasmin Schmidt DO  01/18/19 0261

## 2019-01-18 NOTE — ED NOTES
Pt states he is due for his blood pressure medicine and he will take that upon arrival to home as he is aware his blood pressure is elevated  Dr Renford Mohs made aware of same        Marivel Keith RN  01/18/19 6010

## 2019-01-18 NOTE — DISCHARGE INSTRUCTIONS
Urinary Tract Infection in Men, Ambulatory Care   GENERAL INFORMATION:   A urinary tract infection (UTI)  is caused by bacteria that get inside your urinary tract  Most bacteria that enter your urinary tract are expelled when you urinate  If the bacteria stay in your urinary tract, you may get an infection  Your urinary tract includes your kidneys, ureters, bladder, and urethra  Urine is made in your kidneys, and it flows from the ureters to the bladder  Urine leaves the bladder through the urethra  A UTI is more common in your lower urinary tract, which includes your bladder and urethra  Common symptoms include the following:   · Urinating more often or waking from sleep to urinate    · Pain or burning when you urinate    · Pain or pressure in your lower abdomen    · Urine that smells bad    · Leaking urine  Seek immediate care for the following symptoms:   · Urinating very little or not at all    · Vomiting    · Shaking chills with a fever    · Side or back pain that gets worse  Treatment for a UTI  may include medicines to treat a bacterial infection  You may also need medicines to decrease pain and burning, or decrease the urge to urinate often  Prevent a UTI:   · Urinate when you feel the urge  Do not hold your urine  Urinate as soon as you feel you have to  · Drink liquids as directed  Ask how much liquid to drink each day and which liquids are best for you  You may need to drink more fluids than usual to help flush out the bacteria  Do not drink alcohol, caffeine, and citrus juices  These can irritate your bladder and increase your symptoms  · Apply heat  on your abdomen for 20 to 30 minutes every 2 hours for as many days as directed  Heat helps decrease discomfort and pressure in your bladder  Follow up with your healthcare provider as directed:  Write down your questions so you remember to ask them during your visits  CARE AGREEMENT:   You have the right to help plan your care   Learn about your health condition and how it may be treated  Discuss treatment options with your caregivers to decide what care you want to receive  You always have the right to refuse treatment  The above information is an  only  It is not intended as medical advice for individual conditions or treatments  Talk to your doctor, nurse or pharmacist before following any medical regimen to see if it is safe and effective for you  © 2014 [address] is for End User's use only and may not be sold, redistributed or otherwise used for commercial purposes  All illustrations and images included in CareNotes® are the copyrighted property of [employer]  or [employer]  Ureteral Stones   WHAT YOU NEED TO KNOW:   A ureteral stone is a stone that forms in the kidney and moves down the ureter and gets stuck there  The ureter is the tube that takes urine from the kidney to the bladder  Stones can form in the urinary system when your urine has high levels of minerals and salts  Urinary stones can be made of uric acid, calcium, phosphate, or oxalate crystals  DISCHARGE INSTRUCTIONS:   Return to the emergency department if:   · You have severe pain that does not improve, even after you take medicine  · You have vomiting that is not relieved by medicine  · You develop a fever  Contact your healthcare provider if:   · You develop a fever  · You have any questions or concerns about your condition or care  Follow up with your healthcare provider as directed: You may need to return for more tests  Write down your questions so you remember to ask them during your visits  Medicines: You may need any of the following:  · NSAIDs , such as ibuprofen, help decrease swelling, pain, and fever  This medicine is available with or without a doctor's order  NSAIDs can cause stomach bleeding or kidney problems in certain people   If you take blood thinner medicine, always ask your healthcare provider if NSAIDs are safe for you  Always read the medicine label and follow directions  · Prescription pain medicine  may help decrease pain or help your ureteral stone pass  Do not wait until the pain is severe before you take pain medicine  · Nausea medicine  may help calm your stomach and prevent vomiting  · Take your medicine as directed  Contact your healthcare provider if you think your medicine is not helping or if you have side effects  Tell him or her if you are allergic to any medicine  Keep a list of the medicines, vitamins, and herbs you take  Include the amounts, and when and why you take them  Bring the list or the pill bottles to follow-up visits  Carry your medicine list with you in case of an emergency  Self-care:   · Drink plenty of liquids  Your healthcare provider may tell you to drink at least 8 to 12 (eight-ounce) cups of liquids each day  This helps flush out the ureteral stones when you urinate  Water is the best liquid to drink  · Strain your urine every time you go to the bathroom  Urinate through a strainer or a piece of thin cloth to catch the stones  Take the stones to your healthcare provider so they can be sent to the lab for tests  This will help your healthcare providers plan the best treatment for you  · Ask your healthcare provider about any nutrition changes you need to make  You may need to limit certain foods such as foods high in sodium (salt), certain protein foods, or foods high in oxalate  After you pass your ureteral stone: Once you have passed your ureteral stone, you may need to do a 24-hour urine test  You may need to save all of your urine for 24 hours  Each time you go to the bathroom, you will urinate into a container  Then you will pour your urine into a larger container that is kept cold  You may be told to write down the time and amount of urine you passed  At the end of 24 hours, the urine is sent to a lab for tests  Results from the test will help your healthcare provider plan ways to prevent more stones from forming  © 2017 [address]  Information is for End User's use only and may not be sold, redistributed or otherwise used for commercial purposes  All illustrations and images included in CareNotes® are the copyrighted property of [employer]  or [employer]  The above information is an  only  It is not intended as medical advice for individual conditions or treatments  Talk to your doctor, nurse or pharmacist before following any medical regimen to see if it is safe and effective for you

## 2019-01-20 ENCOUNTER — APPOINTMENT (EMERGENCY)
Dept: RADIOLOGY | Facility: HOSPITAL | Age: 54
End: 2019-01-20
Payer: MEDICARE

## 2019-01-20 ENCOUNTER — HOSPITAL ENCOUNTER (EMERGENCY)
Facility: HOSPITAL | Age: 54
Discharge: HOME | End: 2019-01-20
Attending: EMERGENCY MEDICINE
Payer: MEDICARE

## 2019-01-20 VITALS
RESPIRATION RATE: 62 BRPM | DIASTOLIC BLOOD PRESSURE: 122 MMHG | OXYGEN SATURATION: 93 % | SYSTOLIC BLOOD PRESSURE: 210 MMHG | BODY MASS INDEX: 41.99 KG/M2 | WEIGHT: 310 LBS | HEIGHT: 72 IN | TEMPERATURE: 97.1 F | HEART RATE: 68 BPM

## 2019-01-20 DIAGNOSIS — D72.829 LEUKOCYTOSIS: ICD-10-CM

## 2019-01-20 DIAGNOSIS — N20.1 URETEROLITHIASIS: Primary | ICD-10-CM

## 2019-01-20 DIAGNOSIS — I10 HYPERTENSION: ICD-10-CM

## 2019-01-20 DIAGNOSIS — R73.9 HYPERGLYCEMIA: ICD-10-CM

## 2019-01-20 DIAGNOSIS — N39.0 URINARY TRACT INFECTION: ICD-10-CM

## 2019-01-20 DIAGNOSIS — E86.0 DEHYDRATION: ICD-10-CM

## 2019-01-20 LAB
ALBUMIN SERPL BCP-MCNC: 4.3 G/DL (ref 3.5–5.7)
ALP SERPL-CCNC: 83 U/L (ref 40–150)
ALT SERPL W P-5'-P-CCNC: 22 U/L (ref 7–52)
ANION GAP SERPL CALCULATED.3IONS-SCNC: 8 MMOL/L (ref 4–13)
APTT PPP: 42 SECONDS (ref 26–38)
AST SERPL W P-5'-P-CCNC: 25 U/L (ref 13–39)
BASOPHILS # BLD AUTO: 0.1 THOUSANDS/ΜL (ref 0–0.1)
BASOPHILS NFR BLD AUTO: 1 % (ref 0–2)
BILIRUB SERPL-MCNC: 0.6 MG/DL (ref 0.2–1)
BUN SERPL-MCNC: 22 MG/DL (ref 7–25)
CALCIUM SERPL-MCNC: 9.6 MG/DL (ref 8.6–10.5)
CHLORIDE SERPL-SCNC: 95 MMOL/L (ref 98–107)
CO2 SERPL-SCNC: 26 MMOL/L (ref 21–31)
CREAT SERPL-MCNC: 1.05 MG/DL (ref 0.7–1.3)
EOSINOPHIL # BLD AUTO: 0.4 THOUSAND/ΜL (ref 0–0.61)
EOSINOPHIL NFR BLD AUTO: 2 % (ref 0–5)
ERYTHROCYTE [DISTWIDTH] IN BLOOD BY AUTOMATED COUNT: 13.7 % (ref 11.5–14.5)
GFR SERPL CREATININE-BSD FRML MDRD: 80 ML/MIN/1.73SQ M
GLUCOSE SERPL-MCNC: 258 MG/DL (ref 65–99)
HCT VFR BLD AUTO: 46.4 % (ref 36.5–49.3)
HGB BLD-MCNC: 15.8 G/DL (ref 14–18)
INR PPP: 1.09 (ref 0.9–1.5)
LIPASE SERPL-CCNC: 40 U/L (ref 11–82)
LYMPHOCYTES # BLD AUTO: 1.2 THOUSANDS/ΜL (ref 0.6–4.47)
LYMPHOCYTES NFR BLD AUTO: 7 % (ref 21–51)
MCH RBC QN AUTO: 30.3 PG (ref 26–34)
MCHC RBC AUTO-ENTMCNC: 34.1 G/DL (ref 31–37)
MCV RBC AUTO: 89 FL (ref 81–99)
MONOCYTES # BLD AUTO: 0.7 THOUSAND/ΜL (ref 0.17–1.22)
MONOCYTES NFR BLD AUTO: 4 % (ref 2–12)
NEUTROPHILS # BLD AUTO: 13.9 THOUSANDS/ΜL (ref 1.4–6.5)
NEUTS SEG NFR BLD AUTO: 85 % (ref 42–75)
NRBC BLD AUTO-RTO: 0 /100 WBCS
PLATELET # BLD AUTO: 324 THOUSANDS/UL (ref 149–390)
PMV BLD AUTO: 7.7 FL (ref 8.6–11.7)
POTASSIUM SERPL-SCNC: 5 MMOL/L (ref 3.5–5.5)
PROT SERPL-MCNC: 7.8 G/DL (ref 6.4–8.9)
PROTHROMBIN TIME: 12.6 SECONDS (ref 10.2–13)
RBC # BLD AUTO: 5.22 MILLION/UL (ref 4.3–5.9)
SODIUM SERPL-SCNC: 129 MMOL/L (ref 134–143)
WBC # BLD AUTO: 16.2 THOUSAND/UL (ref 4.8–10.8)

## 2019-01-20 PROCEDURE — 96361 HYDRATE IV INFUSION ADD-ON: CPT

## 2019-01-20 PROCEDURE — 85610 PROTHROMBIN TIME: CPT | Performed by: EMERGENCY MEDICINE

## 2019-01-20 PROCEDURE — 96375 TX/PRO/DX INJ NEW DRUG ADDON: CPT

## 2019-01-20 PROCEDURE — 96376 TX/PRO/DX INJ SAME DRUG ADON: CPT

## 2019-01-20 PROCEDURE — 99284 EMERGENCY DEPT VISIT MOD MDM: CPT

## 2019-01-20 PROCEDURE — 80053 COMPREHEN METABOLIC PANEL: CPT | Performed by: EMERGENCY MEDICINE

## 2019-01-20 PROCEDURE — 96374 THER/PROPH/DIAG INJ IV PUSH: CPT

## 2019-01-20 PROCEDURE — 93005 ELECTROCARDIOGRAM TRACING: CPT

## 2019-01-20 PROCEDURE — 85730 THROMBOPLASTIN TIME PARTIAL: CPT | Performed by: EMERGENCY MEDICINE

## 2019-01-20 PROCEDURE — 36415 COLL VENOUS BLD VENIPUNCTURE: CPT | Performed by: EMERGENCY MEDICINE

## 2019-01-20 PROCEDURE — 74018 RADEX ABDOMEN 1 VIEW: CPT

## 2019-01-20 PROCEDURE — 83690 ASSAY OF LIPASE: CPT | Performed by: EMERGENCY MEDICINE

## 2019-01-20 PROCEDURE — 85025 COMPLETE CBC W/AUTO DIFF WBC: CPT | Performed by: EMERGENCY MEDICINE

## 2019-01-20 RX ORDER — LABETALOL 20 MG/4 ML (5 MG/ML) INTRAVENOUS SYRINGE
10 ONCE
Status: COMPLETED | OUTPATIENT
Start: 2019-01-20 | End: 2019-01-20

## 2019-01-20 RX ORDER — ONDANSETRON 2 MG/ML
4 INJECTION INTRAMUSCULAR; INTRAVENOUS ONCE
Status: COMPLETED | OUTPATIENT
Start: 2019-01-20 | End: 2019-01-20

## 2019-01-20 RX ORDER — CLONIDINE HYDROCHLORIDE 0.1 MG/1
0.2 TABLET ORAL ONCE
Status: COMPLETED | OUTPATIENT
Start: 2019-01-20 | End: 2019-01-20

## 2019-01-20 RX ORDER — HYDROMORPHONE HCL/PF 1 MG/ML
1 SYRINGE (ML) INJECTION ONCE
Status: COMPLETED | OUTPATIENT
Start: 2019-01-20 | End: 2019-01-20

## 2019-01-20 RX ADMIN — HYDROMORPHONE HYDROCHLORIDE 1 MG: 1 INJECTION, SOLUTION INTRAMUSCULAR; INTRAVENOUS; SUBCUTANEOUS at 10:22

## 2019-01-20 RX ADMIN — CLONIDINE HYDROCHLORIDE 0.2 MG: 0.1 TABLET ORAL at 13:06

## 2019-01-20 RX ADMIN — SODIUM CHLORIDE 1000 ML: 0.9 INJECTION, SOLUTION INTRAVENOUS at 11:37

## 2019-01-20 RX ADMIN — ONDANSETRON 4 MG: 2 INJECTION INTRAMUSCULAR; INTRAVENOUS at 10:21

## 2019-01-20 RX ADMIN — LABETALOL 20 MG/4 ML (5 MG/ML) INTRAVENOUS SYRINGE 10 MG: at 13:07

## 2019-01-20 RX ADMIN — SODIUM CHLORIDE 1000 ML: 0.9 INJECTION, SOLUTION INTRAVENOUS at 10:20

## 2019-01-20 RX ADMIN — HYDROMORPHONE HYDROCHLORIDE 1 MG: 1 INJECTION, SOLUTION INTRAMUSCULAR; INTRAVENOUS; SUBCUTANEOUS at 11:37

## 2019-01-20 NOTE — ED PROVIDER NOTES
History  Chief Complaint   Patient presents with    Abdominal Pain     Patient has suprapubic pain related to a kidney stone and lithotripsy he recently had  This is his third visit this week for same     Patient is a 48-year-old male who recently underwent laser lithotripsy and stenting for left ureteral calculus presents the emergency department this morning with acute recurrence of severe left flank pain that recurred after he was lifting a mattress this morning also notes some hematuria this morning and nausea and vomiting  History provided by:  Patient and EMS personnel  Flank Pain   Pain location:  L flank  Pain quality: aching and cramping    Pain radiates to:  LLQ, back and suprapubic region  Pain severity:  Severe  Onset quality:  Sudden  Duration:  1 hour  Timing:  Constant  Progression:  Worsening  Chronicity:  New  Associated symptoms: dysuria, hematuria, nausea and vomiting    Associated symptoms: no chest pain, no chills, no constipation, no cough, no diarrhea, no fatigue, no fever, no shortness of breath and no sore throat        Prior to Admission Medications   Prescriptions Last Dose Informant Patient Reported? Taking?    cephalexin (KEFLEX) 500 mg capsule   No No   Sig: Take 1 capsule (500 mg total) by mouth every 12 (twelve) hours for 10 days   ciprofloxacin (CIPRO) 500 mg tablet   No No   Sig: Take 1 tablet (500 mg total) by mouth every 12 (twelve) hours for 7 days   cloNIDine (CATAPRES) 0 3 mg tablet   No No   Sig: Take 1 tablet (0 3 mg total) by mouth 2 (two) times a day for 14 days   metoprolol tartrate (LOPRESSOR) 50 mg tablet   No No   Sig: Take 1 tablet (50 mg total) by mouth 2 (two) times a day for 14 days   ondansetron (ZOFRAN-ODT) 4 mg disintegrating tablet   No No   Sig: Take 1 tablet (4 mg total) by mouth every 8 (eight) hours as needed for nausea or vomiting   oxyCODONE-acetaminophen (PERCOCET) 5-325 mg per tablet   Yes No   Sig: oxycodone/acetaminophen 5-325 mg tabs oxyCODONE-acetaminophen (PERCOCET) 5-325 mg per tablet   No No   Sig: Take 1 tablet by mouth every 4 (four) hours as needed for moderate pain for up to 10 doses Max Daily Amount: 6 tablets   pregabalin (LYRICA) 200 MG capsule   Yes No   Sig: Take 1 capsule by mouth 2 (two) times a day   tamsulosin (FLOMAX) 0 4 mg   No No   Sig: Take 1 capsule (0 4 mg total) by mouth daily with dinner   Patient taking differently: Take 0 8 mg by mouth daily with dinner        Facility-Administered Medications: None       Past Medical History:   Diagnosis Date    Anxiety 5/2/2018    Arthritis     Chest pain     Last Assessed: 10/5/2016    Hypertension     Kidney stone     Obesity     Pleural effusion     Last Assessed: 9/15/2017    Pneumonia     Sciatica        Past Surgical History:   Procedure Laterality Date    CHEST TUBE INSERTION      Last Assessed: 9/15/2017    FL CYSTOGRAM  11/15/2018    HERNIA REPAIR  2011    LUMBAR LAMINECTOMY  2009    ID CYSTOURETHROSCOPY,URETER CATHETER Left 11/15/2018    Procedure: CYSTOSCOPY RETROGRADE PYELOGRAM WITH INSERTION STENT URETERAL;  Surgeon: Erendira Jewell MD;  Location: [facility] MAIN OR;  Service: Urology    TONSILLECTOMY      Last Assessed: 9/15/2017       Family History   Problem Relation Age of Onset    Other Mother         Cardiac Disorder    Heart failure Mother     Diabetes Mother     Cancer Father     Liver cancer Father     Cancer Brother     Testicular cancer Brother     Lung cancer Brother     Liver cancer Family      I have reviewed and agree with the history as documented  Social History   Substance Use Topics    Smoking status: Current Every Day Smoker     Packs/day: 1 00     Types: Cigarettes    Smokeless tobacco: Never Used    Alcohol use No      Comment: Former consumption of alcohol        Review of Systems   Constitutional: Negative for activity change, appetite change, chills, fatigue and fever     HENT: Negative for congestion, ear pain, rhinorrhea and sore throat  Eyes: Negative for discharge, redness and visual disturbance  Respiratory: Negative for cough, chest tightness, shortness of breath and wheezing  Cardiovascular: Negative for chest pain and palpitations  Gastrointestinal: Positive for abdominal pain, nausea and vomiting  Negative for constipation and diarrhea  Endocrine: Negative for polydipsia and polyuria  Genitourinary: Positive for dysuria, flank pain, frequency, hematuria and urgency  Negative for difficulty urinating  Musculoskeletal: Negative for arthralgias and myalgias  Skin: Negative for color change, pallor and rash  Neurological: Negative for dizziness, weakness, light-headedness, numbness and headaches  Hematological: Negative for adenopathy  Does not bruise/bleed easily  All other systems reviewed and are negative  Physical Exam  Physical Exam   Constitutional: He is oriented to person, place, and time  He appears well-developed and well-nourished  HENT:   Head: Normocephalic and atraumatic  Right Ear: External ear normal    Left Ear: External ear normal    Nose: Nose normal    Mouth/Throat: Oropharynx is clear and moist    Eyes: Pupils are equal, round, and reactive to light  Conjunctivae and EOM are normal    Neck: Normal range of motion  Neck supple  Cardiovascular: Normal rate, regular rhythm, normal heart sounds and intact distal pulses  Pulmonary/Chest: Effort normal and breath sounds normal  No respiratory distress  He has no wheezes  He has no rales  He exhibits no tenderness  Abdominal: Soft  Bowel sounds are normal  He exhibits no distension  There is tenderness in the suprapubic area and left lower quadrant  There is CVA tenderness  There is no guarding  Musculoskeletal: Normal range of motion  Neurological: He is alert and oriented to person, place, and time  No cranial nerve deficit or sensory deficit  Skin: Skin is warm and dry  Psychiatric: He has a normal mood and affect  Nursing note and vitals reviewed        Vital Signs  ED Triage Vitals [01/20/19 0957]   Temperature Pulse Respirations Blood Pressure SpO2   (!) 97 1 °F (36 2 °C) 73 22 (!) 256/122 93 %      Temp Source Heart Rate Source Patient Position - Orthostatic VS BP Location FiO2 (%)   Temporal Monitor Lying Left arm --      Pain Score       Worst Possible Pain           Vitals:    01/20/19 0957 01/20/19 1253   BP: (!) 256/122 (!) 259/130   Pulse: 73 72   Patient Position - Orthostatic VS: Lying Lying       Visual Acuity      ED Medications  Medications   sodium chloride 0 9 % bolus 1,000 mL (0 mL Intravenous Stopped 1/20/19 1130)   ondansetron (ZOFRAN) injection 4 mg (4 mg Intravenous Given 1/20/19 1021)   HYDROmorphone (DILAUDID) injection 1 mg (1 mg Intravenous Given 1/20/19 1022)   sodium chloride 0 9 % bolus 1,000 mL (0 mL Intravenous Stopped 1/20/19 1254)   HYDROmorphone (DILAUDID) injection 1 mg (1 mg Intravenous Given 1/20/19 1137)   cloNIDine (CATAPRES) tablet 0 2 mg (0 2 mg Oral Given 1/20/19 1306)   Labetalol HCl (NORMODYNE) injection 10 mg (10 mg Intravenous Given 1/20/19 1307)       Diagnostic Studies  Results Reviewed     Procedure Component Value Units Date/Time    Comprehensive metabolic panel [[id number]]  (Abnormal) Collected:  01/20/19 1016    Lab Status:  Final result Specimen:  Blood from Arm, Left Updated:  01/20/19 1046     Sodium 129 (L) mmol/L      Potassium 5 0 mmol/L      Chloride 95 (L) mmol/L      CO2 26 mmol/L      ANION GAP 8 mmol/L      BUN 22 mg/dL      Creatinine 1 05 mg/dL      Glucose 258 (H) mg/dL      Calcium 9 6 mg/dL      AST 25 U/L      ALT 22 U/L      Alkaline Phosphatase 83 U/L      Total Protein 7 8 g/dL      Albumin 4 3 g/dL      Total Bilirubin 0 60 mg/dL      eGFR 80 ml/min/1 73sq m     Narrative:         National Kidney Disease Education Program recommendations are as follows:  GFR calculation is accurate only with a steady state creatinine  Chronic Kidney disease less than 60 ml/min/1 73 sq  meters  Kidney failure less than 15 ml/min/1 73 sq  meters  Lipase [[id number]]  (Normal) Collected:  01/20/19 1016    Lab Status:  Final result Specimen:  Blood from Arm, Left Updated:  01/20/19 1046     Lipase 40 u/L     Protime-INR [[id number]]  (Normal) Collected:  01/20/19 1016    Lab Status:  Final result Specimen:  Blood from Arm, Left Updated:  01/20/19 1037     Protime 12 6 seconds      INR 1 09    APTT [[id number]]  (Abnormal) Collected:  01/20/19 1016    Lab Status:  Final result Specimen:  Blood from Arm, Left Updated:  01/20/19 1037     PTT 42 (H) seconds     CBC and differential [[id number]]  (Abnormal) Collected:  01/20/19 1016    Lab Status:  Final result Specimen:  Blood from Arm, Left Updated:  01/20/19 1032     WBC 16 20 (H) Thousand/uL      RBC 5 22 Million/uL      Hemoglobin 15 8 g/dL      Hematocrit 46 4 %      MCV 89 fL      MCH 30 3 pg      MCHC 34 1 g/dL      RDW 13 7 %      MPV 7 7 (L) fL      Platelets 758 Thousands/uL      nRBC 0 /100 WBCs      Neutrophils Relative 85 (H) %      Lymphocytes Relative 7 (L) %      Monocytes Relative 4 %      Eosinophils Relative 2 %      Basophils Relative 1 %      Neutrophils Absolute 13 90 (H) Thousands/µL      Lymphocytes Absolute 1 20 Thousands/µL      Monocytes Absolute 0 70 Thousand/µL      Eosinophils Absolute 0 40 Thousand/µL      Basophils Absolute 0 10 Thousands/µL     UA w Reflex to Microscopic w Reflex to Culture [[id number]]     Lab Status:  No result Specimen:  Urine                  XR abdomen 1 view kub   Final Result by Tatyana Hatfield MD (01/20 1312)      No radiopaque urinary tract calculi  Double-J stent on the left renal collecting system redemonstrated           Workstation performed: [id number]                    Procedures  ECG 12 Lead Documentation  Date/Time: 1/20/2019 11:40 AM  Performed by: Jessica Raymond  Authorized by: Jessica Raymond     ECG reviewed by me, the ED Provider: yes    Patient location:  ED  Rate:     ECG rate:  65    ECG rate assessment: normal    Rhythm:     Rhythm: sinus rhythm    QRS:     QRS axis:  Left  ST segments:     ST segments:  Non-specific  T waves:     T waves: non-specific             Phone Contacts  ED Phone Contact    ED Course        Ct Abdomen Pelvis Wo Contrast    Result Date: 1/18/2019  Narrative: CT ABDOMEN AND PELVIS WITHOUT IV CONTRAST INDICATION:   Abdominal pain, unspecified Flank pain, recurrent stone disease suspected  COMPARISON:  11/22/2018  TECHNIQUE:  CT examination of the abdomen and pelvis was performed without intravenous contrast   Axial, sagittal, and coronal 2D reformatted images were created from the source data and submitted for interpretation  Radiation dose length product (DLP) for this visit:  1599 7 mGy-cm   This examination, like all CT scans performed in the [facility], was performed utilizing techniques to minimize radiation dose exposure, including the use of iterative  reconstruction and automated exposure control  Enteric contrast was administered  FINDINGS: ABDOMEN LOWER CHEST:  No clinically significant abnormality identified in the visualized lower chest  LIVER/BILIARY TREE:  Unremarkable  GALLBLADDER:  No calcified gallstones  No pericholecystic inflammatory change  SPLEEN:  Unremarkable  PANCREAS:  Unremarkable  ADRENAL GLANDS:  Unremarkable  KIDNEYS/URETERS:  Left ureteral stent is noted  Previously noted left lower pole ureteral calculus now in multiple fragments measuring 1 to 2 mm  STOMACH AND BOWEL:  Unremarkable  APPENDIX:  No findings to suggest appendicitis  ABDOMINOPELVIC CAVITY:  No ascites or free intraperitoneal air  No lymphadenopathy  VESSELS:  Unremarkable for patient's age  PELVIS REPRODUCTIVE ORGANS:  Unremarkable for patient's age  URINARY BLADDER:  There within the bladder is presumably iatrogenic  ABDOMINAL WALL/INGUINAL REGIONS:  Unremarkable  OSSEOUS STRUCTURES:  No acute fracture or destructive osseous lesion  Impression: Interval fragmentation of left renal calculus  Left ureteral stent present without hydronephrosis  Workstation performed: [id number]         Xr Abdomen 1 View Kub    Result Date: 1/15/2019  Narrative: ABDOMEN INDICATION:   renal stone  Abdominal pain  COMPARISON:  12/25/2018 VIEWS:  AP supine FINDINGS: Left ureteral stent appears in similar position to the previous examination  Again seen is a left renal calculus measuring approximately 7 mm  No radiopaque ureteral calculi identified  Nonobstructive bowel gas pattern  No acute osseous abnormality is seen  Impression: Left ureteral stent appears in similar position to the prior study  7 mm left renal calculus, also similar to prior study Workstation performed: [id number]       [id number] - spoke with Dr Bruno Ernst urology  on-call patient known to him reviewed case and findings in the emergency felt that at this point the patient symptoms are mostly due to the stent which needs to be removed he recommends that this can be done as an outpatient recommends the patient following up with Dr Angle Hathaway in the office promptly for stent removal                         MDM  Number of Diagnoses or Management Options  Dehydration: new and requires workup  Hyperglycemia: new and requires workup  Hypertension: established and worsening  Leukocytosis: established and improving  Ureterolithiasis: established and improving  Urinary tract infection: established and improving  Diagnosis management comments: Patient remained clinically stable in the emergency department afebrile nontoxic well-appearing no sepsis white count is decreasing he complained of gross hematuria but his urine is consistent with peridium stained urine which he is taking    There is some concern for possible drug-seeking behavior also the patient is poorly compliant with his home medications and is chronically hypertensive and newly noted to be hyperglycemic and slightly dehydrated the patient received 2 L of IV fluids and pain control in the ED and felt improved  Blood pressure improved after antihypertensive meds given in the ED no evidence of hypertensive emergency or hypertensive urgency  He wished to return home and follow up with Urology as an outpatient and will call his urologist tomorrow for follow-up and further eval in the office tomorrow as advised  Patient also understands that he would follow up with his family physician for elevated blood pressure and elevated blood glucose in the ED advised to continue the Cipro he was recently prescribed for possible UTI as cultures still pending return precautions and anticipatory guidance discussed           Amount and/or Complexity of Data Reviewed  Clinical lab tests: reviewed and ordered  Tests in the radiology section of CPT®: ordered and reviewed  Tests in the medicine section of CPT®: ordered and reviewed  Decide to obtain previous medical records or to obtain history from someone other than the patient: yes  Review and summarize past medical records: yes  Independent visualization of images, tracings, or specimens: yes    Risk of Complications, Morbidity, and/or Mortality  Presenting problems: moderate  Management options: moderate    Patient Progress  Patient progress: stable    CritCare Time    Disposition  Final diagnoses:   Ureterolithiasis - Status post laser lithotripsy and stent   Hypertension   Hyperglycemia   Dehydration   Urinary tract infection   Leukocytosis     Time reflects when diagnosis was documented in both MDM as applicable and the Disposition within this note     Time User Action Codes Description Comment    1/20/2019 12:28 PM Ezzard Staple Add [R52] Intractable pain     1/20/2019 12:28 PM Ezzard Staple Add [N20 1] Ureterolithiasis     1/20/2019 12:28 PM Ezzard Staple Modify [N20 1] Ureterolithiasis Status post laser lithotripsy and stent    1/20/2019 12:28 PM Ezzard Staple Add [I10] Hypertension     1/20/2019 12:28 PM Gabby Colla Add [R73 9] Hyperglycemia     1/20/2019 12:28 PM Gabby Collkeegan Add [E86 0] Dehydration     1/20/2019 12:28 PM Connor De Leon Add [N39 0] Urinary tract infection     1/20/2019 12:28 PM Gabby Colla Add [Y95 468] Leukocytosis     1/20/2019 12:45 PM Kian De Leon Jaimie Modify [N20 1] Ureterolithiasis Status post laser lithotripsy and stent    1/20/2019 12:45 PM Connor De Leon Remove [R52] Intractable pain       ED Disposition     None      Follow-up Information     Follow up With Specialties Details Why Contact Info    Bernardo Hoff MD Urology Schedule an appointment as soon as possible for a visit in 1 day  [address]  [facility]  Elias Benavides [address], DO Family Medicine Schedule an appointment as soon as possible for a visit in 2 days HTN; Hyperglycemia [address]            Patient's Medications   Discharge Prescriptions    No medications on file     No discharge procedures on file      ED Provider  Electronically Signed by               Lito Valiente DO  01/20/19 3264

## 2019-01-20 NOTE — DISCHARGE INSTRUCTIONS
Hyperglycemia, Non-Diabetic   WHAT YOU NEED TO KNOW:   Hyperglycemia is a blood glucose (sugar) level that is higher than normal  Hyperglycemia can be short-term, or it can become a long-term condition that leads to diabetes  DISCHARGE INSTRUCTIONS:   Medicines: You may  need any of the following:  · Hypoglycemic medicine  helps to decrease the amount of sugar in your blood  This medicine helps your body move the sugar to your cells, where it is needed for energy  Your healthcare provider will tell you how often to take this medicine and how long to take it  · Insulin  helps to decrease blood sugar levels  You may need 1 or more shots of insulin each day  You or a family member will be taught how to give the insulin shots  Your healthcare provider will tell you how often you need to inject insulin each day  He will also tell you how long you will need to take it  · Take your medicine as directed  Contact your healthcare provider if you think your medicine is not helping or if you have side effects  Tell him of her if you are allergic to any medicine  Keep a list of the medicines, vitamins, and herbs you take  Include the amounts, and when and why you take them  Bring the list or the pill bottles to follow-up visits  Carry your medicine list with you in case of an emergency  Follow up with your healthcare provider as directed: You may need to return for more tests  Your healthcare provider may also need to refer you to a specialist, such as a dietitian  Write down your questions so you remember to ask them during your visits  Manage hyperglycemia:  The following may help keep your blood sugar at the level recommended by your healthcare provider:  · Get regular exercise  This can help to lower your blood sugar levels  It can also improve your heart health and help you stay at a healthy weight  Get at least 30 minutes of exercise 5 days each week   Ask your healthcare provider about the best exercise plan for you  · Lose weight if you are overweight  Even a small loss of 5% to 10% of your body weight can help to decrease your blood sugar levels  It can also improve your heart health  · Eat healthy foods  Include foods that are high in fiber, such as vegetables, fruits, whole grains, and beans  Also include foods that are low in fat, such as low-fat dairy (milk, yogurt, and cheese), fish, and lean meat  Limit foods that are high in calories and sugar, such as sweet desserts, potato chips, and candy  Limit foods that are high in sodium, such as table salt and salty foods  Your healthcare provider may suggest that you limit carbohydrates to lower your blood sugar levels  · Do not smoke  If you smoke, it is never too late to quit  Smoking can worsen the problems that can occur with hyperglycemia  Ask your healthcare provider for information if you need help quitting  · Limit alcohol  Women should limit alcohol to 1 drink a day  Men should limit alcohol to 2 drinks a day  A drink of alcohol is 12 ounces of beer, 5 ounces of wine, or 1½ ounces of liquor  How to check your blood sugar level: You may need to check your blood sugar level with a blood glucose meter  If you take insulin, you may need to check your blood sugar level at least 3 times each day  Ask your healthcare provider when and how often to check during the day  Ask what your blood sugar levels should be before and after you eat  You may need to check for ketones in your urine or blood if your blood sugar level is high  Write down your results and show them to your healthcare provider  Contact your healthcare provider if:   · You have a fever  · Your blood sugar levels continue to be higher than you were told they should be  · You continue to urinate more often than usual      · You continue to be more thirsty than usual      · You continue to have nausea and vomiting       · You have a wound that has signs of infection, such as redness, swelling, and pus  · You have questions or concerns about your condition or care  Return to the emergency department if:   · Your arm or leg feels warm, tender, and painful  It may look swollen and red  · You feel lightheaded, short of breath, and have chest pain  · You cough up blood  © 2017 [employer] Information is for End User's use only and may not be sold, redistributed or otherwise used for commercial purposes  All illustrations and images included in CareNotes® are the copyrighted property of [employer]  or [employer]  The above information is an  only  It is not intended as medical advice for individual conditions or treatments  Talk to your doctor, nurse or pharmacist before following any medical regimen to see if it is safe and effective for you  Hypertension in the Older Adult   WHAT YOU NEED TO KNOW:   Hypertension is high blood pressure (BP)  Your BP is the force of your blood moving against the walls of your arteries  Normal BP is less than 120/80  Prehypertension is between 120/80 and 139/89  Hypertension is 140/90 or higher  Hypertension causes your heart to work much harder than normal  This can damage your heart  Your blood pressure will increase as you get older  However, hypertension is not a normal part of aging  You can control hypertension with a healthy lifestyle or medicines  A controlled blood pressure helps protect your organs, such as your heart, lungs, brain, and kidneys  DISCHARGE INSTRUCTIONS:   Call 911 or have someone else call for any of the following:   · You have discomfort in your chest that feels like squeezing, pressure, fullness, or pain  · You become confused or have difficulty speaking  · You suddenly feel lightheaded or have trouble breathing  · You have pain or discomfort in your back, neck, jaw, stomach, or arm  · You faint or lose consciousness    Return to the emergency department if:   · You have a severe headache or vision loss  · You have weakness in an arm or leg  · You get dizzy and fall  Contact your healthcare provider if:   · You feel faint, dizzy, confused, or drowsy  · You have been taking your BP medicine and your BP is still higher than your healthcare provider says it should be  · Your blood pressure is lower than your healthcare provider said it should be  · You have questions or concerns about your condition or care  Medicines: You may  need any of the following:  · Medicine  may be used to help lower your BP  You may need more than one type of medicine  · Diuretics  help decrease extra fluid that collects in your body  This will help lower your BP  You may urinate more often while you take this medicine  · Take your medicine as directed  Contact your healthcare provider if you think your medicine is not helping or if you have side effects  Tell him or her if you are allergic to any medicine  Keep a list of the medicines, vitamins, and herbs you take  Include the amounts, and when and why you take them  Bring the list or the pill bottles to follow-up visits  Carry your medicine list with you in case of an emergency  What you need to know about BP medicine:   · Take your medicine at the same time every day  · Do not stop taking your medicine if your BP is at the target goal  A BP at the target goal means that the medicine is working correctly  · Know the name and dose of your medicine  · Refill your medicine before you run out  · Blood pressure medicine can make you feel dizzy  Stand up slowly from a sitting or lying position  This will help prevent dizziness or your risk of falls  · Ask your healthcare provider if you should take your blood pressure medicine on the day of a surgery or procedure  Follow up with your healthcare provider as directed: You will need to return to have your BP checked   Write down your questions so you remember to ask them during your visits  Manage hypertension:   · Check your BP at home 2 times a day or as directed  Take your BP at the same times each day, such as morning and evening  Take 2 readings each time  Ask your healthcare provider for more directions  Keep a record of your BP readings and bring it to your follow-up visits  Ask your healthcare provider what your BP should be  · Limit sodium as directed  Too much sodium can affect your fluid balance and make it hard to control your BP  Check labels to find low-sodium or no-salt-added foods  Some low-sodium foods use potassium salts for flavor  Too much potassium can also cause health problems  Your healthcare provider will tell you how much sodium and potassium are safe for you to have in a day  He or she may recommend that you limit sodium to 2,300 mg a day  · Follow the meal plan recommended by your healthcare provider  A dietitian or your provider can give you more information on low-sodium plans or the DASH (Dietary Approaches to Stop Hypertension) eating plan  The DASH plan is low in sodium, unhealthy fats, and total fat  It is high in potassium, calcium, and fiber  · Exercise to maintain a healthy weight  Exercise will also help decrease your BP  Exercise for 30 minutes a day on most days of the week  Start with 10 minutes at a time  Examples of exercise include brisk walking or riding a stationary bike  Talk to your healthcare provider before you start an exercise plan  He or she can make sure the exercise plan is safe for you  · Decrease stress  This can help lower your BP  Learn ways to relax, such as deep breathing or listening to music  Get plenty of sleep every night  A lack of sleep can increase your stress level  · Limit or do not drink alcohol  Ask your healthcare provider if it is safe for you to drink alcohol  Also ask how much is safe for you to drink       · Do not smoke   Nicotine and other chemicals in cigarettes and cigars can increase your BP and also cause lung damage  Ask your healthcare provider for information if you currently smoke and need help to quit  E-cigarettes or smokeless tobacco still contain nicotine  Talk to your healthcare provider before you use these products  · Manage any other health conditions you have  Health conditions such as diabetes can increase your risk for hypertension  Follow your healthcare provider's instructions and take all your medicines as directed  © 2017 [employer]  Information is for End User's use only and may not be sold, redistributed or otherwise used for commercial purposes  All illustrations and images included in CareNotes® are the copyrighted property of [employer]  or [employer]  The above information is an  only  It is not intended as medical advice for individual conditions or treatments  Talk to your doctor, nurse or pharmacist before following any medical regimen to see if it is safe and effective for you  Ureteral Stones   WHAT YOU NEED TO KNOW:   A ureteral stone is a stone that forms in the kidney and moves down the ureter and gets stuck there  The ureter is the tube that takes urine from the kidney to the bladder  Stones can form in the urinary system when your urine has high levels of minerals and salts  Urinary stones can be made of uric acid, calcium, phosphate, or oxalate crystals  DISCHARGE INSTRUCTIONS:   Return to the emergency department if:   · You have severe pain that does not improve, even after you take medicine  · You have vomiting that is not relieved by medicine  · You develop a fever  Contact your healthcare provider if:   · You develop a fever  · You have any questions or concerns about your condition or care  Follow up with your healthcare provider as directed: You may need to return for more tests   Write down your questions so you remember to ask them during your visits  Medicines: You may need any of the following:  · NSAIDs , such as ibuprofen, help decrease swelling, pain, and fever  This medicine is available with or without a doctor's order  NSAIDs can cause stomach bleeding or kidney problems in certain people  If you take blood thinner medicine, always ask your healthcare provider if NSAIDs are safe for you  Always read the medicine label and follow directions  · Prescription pain medicine  may help decrease pain or help your ureteral stone pass  Do not wait until the pain is severe before you take pain medicine  · Nausea medicine  may help calm your stomach and prevent vomiting  · Take your medicine as directed  Contact your healthcare provider if you think your medicine is not helping or if you have side effects  Tell him or her if you are allergic to any medicine  Keep a list of the medicines, vitamins, and herbs you take  Include the amounts, and when and why you take them  Bring the list or the pill bottles to follow-up visits  Carry your medicine list with you in case of an emergency  Self-care:   · Drink plenty of liquids  Your healthcare provider may tell you to drink at least 8 to 12 (eight-ounce) cups of liquids each day  This helps flush out the ureteral stones when you urinate  Water is the best liquid to drink  · Strain your urine every time you go to the bathroom  Urinate through a strainer or a piece of thin cloth to catch the stones  Take the stones to your healthcare provider so they can be sent to the lab for tests  This will help your healthcare providers plan the best treatment for you  · Ask your healthcare provider about any nutrition changes you need to make  You may need to limit certain foods such as foods high in sodium (salt), certain protein foods, or foods high in oxalate  After you pass your ureteral stone:   Once you have passed your ureteral stone, you may need to do a 24-hour urine test  You may need to save all of your urine for 24 hours  Each time you go to the bathroom, you will urinate into a container  Then you will pour your urine into a larger container that is kept cold  You may be told to write down the time and amount of urine you passed  At the end of 24 hours, the urine is sent to a lab for tests  Results from the test will help your healthcare provider plan ways to prevent more stones from forming  © 2017 [address] [employer] Information is for End User's use only and may not be sold, redistributed or otherwise used for commercial purposes  All illustrations and images included in CareNotes® are the copyrighted property of [employer]  or [employer]  The above information is an  only  It is not intended as medical advice for individual conditions or treatments  Talk to your doctor, nurse or pharmacist before following any medical regimen to see if it is safe and effective for you

## 2019-01-21 ENCOUNTER — HOSPITAL ENCOUNTER (EMERGENCY)
Facility: HOSPITAL | Age: 54
Discharge: HOME | End: 2019-01-21
Attending: FAMILY MEDICINE | Admitting: FAMILY MEDICINE
Payer: MEDICARE

## 2019-01-21 VITALS
OXYGEN SATURATION: 96 % | RESPIRATION RATE: 20 BRPM | SYSTOLIC BLOOD PRESSURE: 186 MMHG | WEIGHT: 310.85 LBS | HEIGHT: 72 IN | TEMPERATURE: 98.1 F | BODY MASS INDEX: 42.1 KG/M2 | DIASTOLIC BLOOD PRESSURE: 103 MMHG | HEART RATE: 88 BPM

## 2019-01-21 DIAGNOSIS — I10 ACCELERATED HYPERTENSION: ICD-10-CM

## 2019-01-21 DIAGNOSIS — N20.1 LEFT URETERAL STONE: ICD-10-CM

## 2019-01-21 DIAGNOSIS — R10.9 ACUTE FLANK PAIN: Primary | ICD-10-CM

## 2019-01-21 LAB
ATRIAL RATE: 65 BPM
P AXIS: 33 DEGREES
PR INTERVAL: 144 MS
QRS AXIS: 7 DEGREES
QRSD INTERVAL: 80 MS
QT INTERVAL: 442 MS
QTC INTERVAL: 459 MS
T WAVE AXIS: 34 DEGREES
VENTRICULAR RATE: 65 BPM

## 2019-01-21 PROCEDURE — 93010 ELECTROCARDIOGRAM REPORT: CPT | Performed by: INTERNAL MEDICINE

## 2019-01-21 PROCEDURE — 99284 EMERGENCY DEPT VISIT MOD MDM: CPT

## 2019-01-21 PROCEDURE — 96375 TX/PRO/DX INJ NEW DRUG ADDON: CPT

## 2019-01-21 PROCEDURE — 96374 THER/PROPH/DIAG INJ IV PUSH: CPT

## 2019-01-21 PROCEDURE — 96361 HYDRATE IV INFUSION ADD-ON: CPT

## 2019-01-21 RX ORDER — MORPHINE SULFATE 4 MG/ML
4 INJECTION, SOLUTION INTRAMUSCULAR; INTRAVENOUS ONCE
Status: COMPLETED | OUTPATIENT
Start: 2019-01-21 | End: 2019-01-21

## 2019-01-21 RX ORDER — ONDANSETRON 2 MG/ML
4 INJECTION INTRAMUSCULAR; INTRAVENOUS ONCE
Status: COMPLETED | OUTPATIENT
Start: 2019-01-21 | End: 2019-01-21

## 2019-01-21 RX ORDER — ONDANSETRON 4 MG/1
4 TABLET, FILM COATED ORAL EVERY 8 HOURS PRN
Qty: 20 TABLET | Refills: 0 | Status: SHIPPED | OUTPATIENT
Start: 2019-01-21

## 2019-01-21 RX ORDER — HYDROCODONE BITARTRATE AND ACETAMINOPHEN 5; 325 MG/1; MG/1
1 TABLET ORAL EVERY 6 HOURS PRN
Qty: 6 TABLET | Refills: 0 | Status: SHIPPED | OUTPATIENT
Start: 2019-01-21 | End: 2019-01-23

## 2019-01-21 RX ADMIN — ONDANSETRON 4 MG: 2 INJECTION INTRAMUSCULAR; INTRAVENOUS at 12:30

## 2019-01-21 RX ADMIN — MORPHINE SULFATE 4 MG: 4 INJECTION, SOLUTION INTRAMUSCULAR; INTRAVENOUS at 12:28

## 2019-01-21 RX ADMIN — SODIUM CHLORIDE 1000 ML: 0.9 INJECTION, SOLUTION INTRAVENOUS at 12:32

## 2019-01-21 NOTE — ED PROVIDER NOTES
History  Chief Complaint   Patient presents with    Flank Pain     left side  Hx kidney ston with stent  Here yesterday for same       History provided by:  Patient and EMS personnel   used: No    Flank Pain   Pain location:  L flank  Pain quality: sharp    Pain radiates to:  Does not radiate  Pain severity:  Severe  Onset quality:  Gradual  Duration:  4 days  Timing:  Constant  Progression:  Unchanged  Chronicity:  Recurrent  Relieved by: Dilaudid  Worsened by: Movement  Ineffective treatments:  None tried  Associated symptoms: nausea and vomiting    Associated symptoms: no dysuria and no hematuria    Risk factors: obesity      This is a 58-year-old male has been seen in the ED for past 2 days for flank pain and vomiting presents to ED with complain of flank pain and vomiting  Patient recently underwent laser lithotripsy and stenting for left ureteral stone and had not had follow-up with his urologist   Patient has been given antibiotics and pain medication which he states that he took all of his medication and does not any pain medication  And did request EMS to take him to [facility] however they brought him to [facility]   Prior to Admission Medications   Prescriptions Last Dose Informant Patient Reported? Taking?    cephalexin (KEFLEX) 500 mg capsule   No No   Sig: Take 1 capsule (500 mg total) by mouth every 12 (twelve) hours for 10 days   ciprofloxacin (CIPRO) 500 mg tablet   No No   Sig: Take 1 tablet (500 mg total) by mouth every 12 (twelve) hours for 7 days   cloNIDine (CATAPRES) 0 3 mg tablet   No Yes   Sig: Take 1 tablet (0 3 mg total) by mouth 2 (two) times a day for 14 days   metoprolol tartrate (LOPRESSOR) 50 mg tablet   No Yes   Sig: Take 1 tablet (50 mg total) by mouth 2 (two) times a day for 14 days   ondansetron (ZOFRAN-ODT) 4 mg disintegrating tablet   No No   Sig: Take 1 tablet (4 mg total) by mouth every 8 (eight) hours as needed for nausea or vomiting oxyCODONE-acetaminophen (PERCOCET) 5-325 mg per tablet   Yes No   Sig: oxycodone/acetaminophen 5-325 mg tabs   oxyCODONE-acetaminophen (PERCOCET) 5-325 mg per tablet   No No   Sig: Take 1 tablet by mouth every 4 (four) hours as needed for moderate pain for up to 10 doses Max Daily Amount: 6 tablets   pregabalin (LYRICA) 200 MG capsule   Yes Yes   Sig: Take 1 capsule by mouth 2 (two) times a day   tamsulosin (FLOMAX) 0 4 mg   No Yes   Sig: Take 1 capsule (0 4 mg total) by mouth daily with dinner   Patient taking differently: Take 0 8 mg by mouth daily with dinner        Facility-Administered Medications: None       Past Medical History:   Diagnosis Date    Anxiety 5/2/2018    Arthritis     Chest pain     Last Assessed: 10/5/2016    Hypertension     Kidney stone     Obesity     Pleural effusion     Last Assessed: 9/15/2017    Pneumonia     Sciatica        Past Surgical History:   Procedure Laterality Date    CHEST TUBE INSERTION      Last Assessed: 9/15/2017    FL CYSTOGRAM  11/15/2018    HERNIA REPAIR  2011    LUMBAR LAMINECTOMY  2009    CO CYSTOURETHROSCOPY,URETER CATHETER Left 11/15/2018    Procedure: CYSTOSCOPY RETROGRADE PYELOGRAM WITH INSERTION STENT URETERAL;  Surgeon: Dragan Contreras MD;  Location[facility] MAIN OR;  Service: Urology    TONSILLECTOMY      Last Assessed: 9/15/2017       Family History   Problem Relation Age of Onset    Other Mother         Cardiac Disorder    Heart failure Mother     Diabetes Mother     Cancer Father     Liver cancer Father     Cancer Brother     Testicular cancer Brother     Lung cancer Brother     Liver cancer Family      I have reviewed and agree with the history as documented  Social History   Substance Use Topics    Smoking status: Current Every Day Smoker     Packs/day: 1 00     Types: Cigarettes    Smokeless tobacco: Never Used    Alcohol use No      Comment: Former consumption of alcohol        Review of Systems   Constitutional: Negative  HENT: Negative  Respiratory: Negative  Cardiovascular: Negative  Gastrointestinal: Positive for nausea and vomiting  Genitourinary: Positive for flank pain  Negative for dysuria and hematuria  Physical Exam  Physical Exam   Constitutional: He is oriented to person, place, and time  He appears well-developed and well-nourished  HENT:   Head: Normocephalic and atraumatic  Cardiovascular: Normal rate, regular rhythm and normal heart sounds  Pulmonary/Chest: Effort normal and breath sounds normal    Abdominal: Soft  Bowel sounds are normal    Left CVA tenderness  Neurological: He is alert and oriented to person, place, and time  Skin: Skin is warm  Nursing note and vitals reviewed  Vital Signs  ED Triage Vitals   Temperature Pulse Respirations Blood Pressure SpO2   01/21/19 1202 01/21/19 1200 01/21/19 1202 01/21/19 1200 01/21/19 1200   97 8 °F (36 6 °C) 73 20 (!) 203/117 97 %      Temp src Heart Rate Source Patient Position - Orthostatic VS BP Location FiO2 (%)   -- 01/21/19 1202 01/21/19 1202 01/21/19 1202 --    Monitor Lying Left arm       Pain Score       01/21/19 1202       Worst Possible Pain           Vitals:    01/21/19 1200 01/21/19 1202   BP: (!) 203/117 (!) 203/117   Pulse: 73 72   Patient Position - Orthostatic VS:  Lying       Visual Acuity      ED Medications  Medications   sodium chloride 0 9 % bolus 1,000 mL (1,000 mL Intravenous New Bag 1/21/19 1232)   ondansetron (ZOFRAN) injection 4 mg (4 mg Intravenous Given 1/21/19 1230)   morphine (PF) 4 mg/mL injection 4 mg (4 mg Intravenous Given 1/21/19 1228)       Diagnostic Studies  Results Reviewed     None                 No orders to display              Procedures  Procedures       Phone Contacts  ED Phone Contact    ED Course  ED Course as of Jan 21 1309   Mon Jan 21, 2019   1254 Waiting for Dr Verner Hover to call back but pt states he doesn't want to wait and requesting to be discharge home  Pain is better         I have tried calling his urologist Dr Becky Ayala but the office is closed [place] left voice messages  I also called Dr Tremanie Mcarthur waiting for call back  Patient is not interested in following up with his urologist since he is that he does not have a ride and EMS will not take him to his office  Patient also states that he has been out of his blood pressure medication and has not been taking them however he only had 2 metoprolol which she took them about an hour prior to arrival     1250: Recheck blood pressure showed 190/101, pt refused blood pressure medications states he took a extra metoprolol PTA and will get his clonidine refill   I recommended she continue taking his medication since elevated blood pressure can lead to worsening symptoms including stroke  I also recommend the patient should follow up with the his urologist today return to the ED symptoms are worse  Patient is currently stable will discharge him home  MDM  CritCare Time    Disposition  Final diagnoses:   Acute flank pain   Left ureteral stone   Accelerated hypertension     Time reflects when diagnosis was documented in both MDM as applicable and the Disposition within this note     Time User Action Codes Description Comment    1/21/2019 12:34 PM Junious Polio Add [R10 9] Acute flank pain     1/21/2019 12:34 PM Junious Polio Add [N20 1] Left ureteral stone     1/21/2019  1:09 PM Junious Polio Add [I10] Accelerated hypertension       ED Disposition     ED Disposition Condition Comment    Discharge  Jabier Khan discharge to home/self care  Condition at discharge: Stable        Follow-up Information     Follow up With Specialties Details Why Buck Blair MD Urology In 1 day  [address]  [phone]            Patient's Medications   Discharge Prescriptions    HYDROCODONE-ACETAMINOPHEN (NORCO) 5-325 MG PER TABLET    Take 1 tablet by mouth every 6 (six) hours as needed for pain for up to 2 days Max Daily Amount: 4 tablets       Start Date: 1/21/2019 End Date: 1/23/2019       Order Dose: 1 tablet       Quantity: 6 tablet    Refills: 0    ONDANSETRON (ZOFRAN) 4 MG TABLET    Take 1 tablet (4 mg total) by mouth every 8 (eight) hours as needed for nausea or vomiting       Start Date: 1/21/2019 End Date: --       Order Dose: 4 mg       Quantity: 20 tablet    Refills: 0     No discharge procedures on file      ED Provider  Electronically Signed by           Yelitza Melgar MD  01/21/19 6724

## 2019-01-21 NOTE — DISCHARGE INSTRUCTIONS
Ureteral Stones   WHAT YOU NEED TO KNOW:   A ureteral stone is a stone that forms in the kidney and moves down the ureter and gets stuck there  The ureter is the tube that takes urine from the kidney to the bladder  Stones can form in the urinary system when your urine has high levels of minerals and salts  Urinary stones can be made of uric acid, calcium, phosphate, or oxalate crystals  DISCHARGE INSTRUCTIONS:   Return to the emergency department if:   · You have severe pain that does not improve, even after you take medicine  · You have vomiting that is not relieved by medicine  · You develop a fever  Contact your healthcare provider if:   · You develop a fever  · You have any questions or concerns about your condition or care  Follow up with your healthcare provider as directed: You may need to return for more tests  Write down your questions so you remember to ask them during your visits  Medicines: You may need any of the following:  · NSAIDs , such as ibuprofen, help decrease swelling, pain, and fever  This medicine is available with or without a doctor's order  NSAIDs can cause stomach bleeding or kidney problems in certain people  If you take blood thinner medicine, always ask your healthcare provider if NSAIDs are safe for you  Always read the medicine label and follow directions  · Prescription pain medicine  may help decrease pain or help your ureteral stone pass  Do not wait until the pain is severe before you take pain medicine  · Nausea medicine  may help calm your stomach and prevent vomiting  · Take your medicine as directed  Contact your healthcare provider if you think your medicine is not helping or if you have side effects  Tell him or her if you are allergic to any medicine  Keep a list of the medicines, vitamins, and herbs you take  Include the amounts, and when and why you take them  Bring the list or the pill bottles to follow-up visits   Carry your medicine list with you in case of an emergency  Self-care:   · Drink plenty of liquids  Your healthcare provider may tell you to drink at least 8 to 12 (eight-ounce) cups of liquids each day  This helps flush out the ureteral stones when you urinate  Water is the best liquid to drink  · Strain your urine every time you go to the bathroom  Urinate through a strainer or a piece of thin cloth to catch the stones  Take the stones to your healthcare provider so they can be sent to the lab for tests  This will help your healthcare providers plan the best treatment for you  · Ask your healthcare provider about any nutrition changes you need to make  You may need to limit certain foods such as foods high in sodium (salt), certain protein foods, or foods high in oxalate  After you pass your ureteral stone: Once you have passed your ureteral stone, you may need to do a 24-hour urine test  You may need to save all of your urine for 24 hours  Each time you go to the bathroom, you will urinate into a container  Then you will pour your urine into a larger container that is kept cold  You may be told to write down the time and amount of urine you passed  At the end of 24 hours, the urine is sent to a lab for tests  Results from the test will help your healthcare provider plan ways to prevent more stones from forming  © 2017 [address] Information is for End User's use only and may not be sold, redistributed or otherwise used for commercial purposes  All illustrations and images included in CareNotes® are the copyrighted property of A D A M , [employer]  or Cheng Hunter  The above information is an  only  It is not intended as medical advice for individual conditions or treatments  Talk to your doctor, nurse or pharmacist before following any medical regimen to see if it is safe and effective for you  Flank Pain   Harjeet ALBA : Pyelonephritis   In: Stephen Mclaughlin, [employer], et al, The [place]  [address] Emergency Medicine Consult, 4th ed  [address] [facility] [place], [place], 2011  Perry AJ : Flank Pain  In: [facility], Johanne RUTHERFORD, Maggy, The [place]  Clinical Methods: The History, Physical, and Laboratory Examinations, 3rd ed  [place], [place], [address]  Brent DS: Flank Pain  In: Field Guide to Bedside Diagnosis, 2nd ed  [address] [facility],[address] [place], [place], 2007 © 2017 [address] [facility] Information is for End User's use only and may not be sold, redistributed or otherwise used for commercial purposes  All illustrations and images included in CareNotes® are the copyrighted property of A D A M , Inc  or Cheng Hunter  The above information is an  only  It is not intended as medical advice for individual conditions or treatments  Talk to your doctor, nurse or pharmacist before following any medical regimen to see if it is safe and effective for you

## 2019-01-21 NOTE — ED NOTES
WAITING FOR DR JOHNSON TO PHONE, BEFORE PATIENT CAN BE DISCHARGED   PER DR GAUDENCIO Hurst, RN  01/21/19 8221

## 2019-05-07 NOTE — TELEPHONE ENCOUNTER
I would cancel the xanax, but that's just me 
Is this pt suppose to continue these meds?  He is in the C   [employer]
+ premature beats

## 2024-07-31 NOTE — ED NOTES
Pt c/o pain at IV site  No s/sx of infiltration  IV flushes easily   IV removed per pt request      Rosie Bates RN  01/14/19 0026
Pt states he needs "something for pain " Dr Rakesh Tidwell aware     Filippo Mccallum, RN  01/14/19 3946
English

## (undated) DEVICE — STERILE SURGICAL LUBRICANT,  TUBE: Brand: SURGILUBE

## (undated) DEVICE — URETERAL CATHETER ADAPTOR TIP

## (undated) DEVICE — GUIDEWIRE STRGHT TIP 0.038 IN SOLO PLUS

## (undated) DEVICE — SYRINGE 20ML LL

## (undated) DEVICE — CATH URETERAL OPEN END 6FR X 70CM

## (undated) DEVICE — DRAPE C-ARM X-RAY

## (undated) DEVICE — GLOVE INDICATOR UNDERGLOVE SZ 7.5 GREEN

## (undated) DEVICE — GLOVE SRG BIOGEL 7.5

## (undated) DEVICE — BAG URINE DRAINAGE 4000ML CONTINUOUS IRR

## (undated) DEVICE — PACK CUSTOM GU CYSTO PACK RF

## (undated) DEVICE — SPECIMEN CONTAINER: Brand: CARDINAL HEALTH

## (undated) DEVICE — GLOVE SRG BIOGEL 7

## (undated) DEVICE — URETERAL CATHETER 5 FR CONE TIP

## (undated) DEVICE — CATH FOLEY 20FR 5ML 2 WAY SILICONE ELASTIMER